# Patient Record
Sex: FEMALE | Race: WHITE | NOT HISPANIC OR LATINO | Employment: OTHER | URBAN - METROPOLITAN AREA
[De-identification: names, ages, dates, MRNs, and addresses within clinical notes are randomized per-mention and may not be internally consistent; named-entity substitution may affect disease eponyms.]

---

## 2022-01-12 ENCOUNTER — LAB VISIT (OUTPATIENT)
Dept: LAB | Facility: HOSPITAL | Age: 67
End: 2022-01-12
Attending: FAMILY MEDICINE
Payer: MEDICARE

## 2022-01-12 ENCOUNTER — OFFICE VISIT (OUTPATIENT)
Dept: FAMILY MEDICINE | Facility: CLINIC | Age: 67
End: 2022-01-12
Payer: MEDICARE

## 2022-01-12 VITALS
WEIGHT: 119 LBS | RESPIRATION RATE: 15 BRPM | HEIGHT: 65 IN | HEART RATE: 72 BPM | DIASTOLIC BLOOD PRESSURE: 80 MMHG | SYSTOLIC BLOOD PRESSURE: 118 MMHG | BODY MASS INDEX: 19.83 KG/M2

## 2022-01-12 DIAGNOSIS — Z11.59 NEED FOR HEPATITIS C SCREENING TEST: ICD-10-CM

## 2022-01-12 DIAGNOSIS — R42 DIZZINESS: ICD-10-CM

## 2022-01-12 DIAGNOSIS — L65.9 HAIR LOSS: ICD-10-CM

## 2022-01-12 DIAGNOSIS — Z12.11 COLON CANCER SCREENING: ICD-10-CM

## 2022-01-12 DIAGNOSIS — Z12.11 ENCOUNTER FOR SCREENING COLONOSCOPY: ICD-10-CM

## 2022-01-12 DIAGNOSIS — M85.89 OSTEOPENIA OF MULTIPLE SITES: ICD-10-CM

## 2022-01-12 DIAGNOSIS — R42 DIZZINESS: Primary | ICD-10-CM

## 2022-01-12 DIAGNOSIS — J45.20 MILD INTERMITTENT ASTHMA WITHOUT COMPLICATION: ICD-10-CM

## 2022-01-12 DIAGNOSIS — E03.9 ACQUIRED HYPOTHYROIDISM: ICD-10-CM

## 2022-01-12 DIAGNOSIS — Z12.31 SCREENING MAMMOGRAM FOR BREAST CANCER: ICD-10-CM

## 2022-01-12 DIAGNOSIS — I10 ESSENTIAL HYPERTENSION: ICD-10-CM

## 2022-01-12 DIAGNOSIS — Z76.89 ENCOUNTER TO ESTABLISH CARE WITH NEW DOCTOR: ICD-10-CM

## 2022-01-12 LAB
ALBUMIN SERPL BCP-MCNC: 4 G/DL (ref 3.5–5.2)
ALP SERPL-CCNC: 59 U/L (ref 55–135)
ALT SERPL W/O P-5'-P-CCNC: 26 U/L (ref 10–44)
ANION GAP SERPL CALC-SCNC: 9 MMOL/L (ref 8–16)
AST SERPL-CCNC: 26 U/L (ref 10–40)
BASOPHILS # BLD AUTO: 0.07 K/UL (ref 0–0.2)
BASOPHILS NFR BLD: 1.3 % (ref 0–1.9)
BILIRUB SERPL-MCNC: 0.8 MG/DL (ref 0.1–1)
BUN SERPL-MCNC: 8 MG/DL (ref 8–23)
CALCIUM SERPL-MCNC: 9.5 MG/DL (ref 8.7–10.5)
CHLORIDE SERPL-SCNC: 98 MMOL/L (ref 95–110)
CHOLEST SERPL-MCNC: 240 MG/DL (ref 120–199)
CHOLEST/HDLC SERPL: 2.5 {RATIO} (ref 2–5)
CO2 SERPL-SCNC: 27 MMOL/L (ref 23–29)
CREAT SERPL-MCNC: 0.7 MG/DL (ref 0.5–1.4)
DIFFERENTIAL METHOD: ABNORMAL
EOSINOPHIL # BLD AUTO: 0.3 K/UL (ref 0–0.5)
EOSINOPHIL NFR BLD: 6.3 % (ref 0–8)
ERYTHROCYTE [DISTWIDTH] IN BLOOD BY AUTOMATED COUNT: 13.8 % (ref 11.5–14.5)
EST. GFR  (AFRICAN AMERICAN): >60 ML/MIN/1.73 M^2
EST. GFR  (NON AFRICAN AMERICAN): >60 ML/MIN/1.73 M^2
FERRITIN SERPL-MCNC: 124 NG/ML (ref 20–300)
GLUCOSE SERPL-MCNC: 96 MG/DL (ref 70–110)
HCT VFR BLD AUTO: 40 % (ref 37–48.5)
HDLC SERPL-MCNC: 95 MG/DL (ref 40–75)
HDLC SERPL: 39.6 % (ref 20–50)
HGB BLD-MCNC: 13.7 G/DL (ref 12–16)
IMM GRANULOCYTES # BLD AUTO: 0.01 K/UL (ref 0–0.04)
IMM GRANULOCYTES NFR BLD AUTO: 0.2 % (ref 0–0.5)
IRON SERPL-MCNC: 172 UG/DL (ref 30–160)
LDLC SERPL CALC-MCNC: 131.2 MG/DL (ref 63–159)
LYMPHOCYTES # BLD AUTO: 1.6 K/UL (ref 1–4.8)
LYMPHOCYTES NFR BLD: 28.9 % (ref 18–48)
MCH RBC QN AUTO: 31.5 PG (ref 27–31)
MCHC RBC AUTO-ENTMCNC: 34.3 G/DL (ref 32–36)
MCV RBC AUTO: 92 FL (ref 82–98)
MONOCYTES # BLD AUTO: 0.6 K/UL (ref 0.3–1)
MONOCYTES NFR BLD: 10.6 % (ref 4–15)
NEUTROPHILS # BLD AUTO: 2.8 K/UL (ref 1.8–7.7)
NEUTROPHILS NFR BLD: 52.7 % (ref 38–73)
NONHDLC SERPL-MCNC: 145 MG/DL
NRBC BLD-RTO: 0 /100 WBC
PLATELET # BLD AUTO: 287 K/UL (ref 150–450)
PMV BLD AUTO: 8.1 FL (ref 9.2–12.9)
POTASSIUM SERPL-SCNC: 4.2 MMOL/L (ref 3.5–5.1)
PROT SERPL-MCNC: 7.1 G/DL (ref 6–8.4)
RBC # BLD AUTO: 4.35 M/UL (ref 4–5.4)
SATURATED IRON: 53 % (ref 20–50)
SODIUM SERPL-SCNC: 134 MMOL/L (ref 136–145)
TOTAL IRON BINDING CAPACITY: 323 UG/DL (ref 250–450)
TRANSFERRIN SERPL-MCNC: 218 MG/DL (ref 200–375)
TRIGL SERPL-MCNC: 69 MG/DL (ref 30–150)
TSH SERPL DL<=0.005 MIU/L-ACNC: 1.05 UIU/ML (ref 0.4–4)
WBC # BLD AUTO: 5.36 K/UL (ref 3.9–12.7)

## 2022-01-12 PROCEDURE — 80053 COMPREHEN METABOLIC PANEL: CPT | Performed by: FAMILY MEDICINE

## 2022-01-12 PROCEDURE — 84443 ASSAY THYROID STIM HORMONE: CPT | Performed by: FAMILY MEDICINE

## 2022-01-12 PROCEDURE — 80061 LIPID PANEL: CPT | Performed by: FAMILY MEDICINE

## 2022-01-12 PROCEDURE — 99999 PR PBB SHADOW E&M-NEW PATIENT-LVL V: CPT | Mod: PBBFAC,,, | Performed by: FAMILY MEDICINE

## 2022-01-12 PROCEDURE — 36415 COLL VENOUS BLD VENIPUNCTURE: CPT | Performed by: FAMILY MEDICINE

## 2022-01-12 PROCEDURE — 99204 PR OFFICE/OUTPT VISIT, NEW, LEVL IV, 45-59 MIN: ICD-10-PCS | Mod: S$PBB,,, | Performed by: FAMILY MEDICINE

## 2022-01-12 PROCEDURE — 99999 PR PBB SHADOW E&M-NEW PATIENT-LVL V: ICD-10-PCS | Mod: PBBFAC,,, | Performed by: FAMILY MEDICINE

## 2022-01-12 PROCEDURE — 85025 COMPLETE CBC W/AUTO DIFF WBC: CPT | Performed by: FAMILY MEDICINE

## 2022-01-12 PROCEDURE — 99205 OFFICE O/P NEW HI 60 MIN: CPT | Mod: PBBFAC | Performed by: FAMILY MEDICINE

## 2022-01-12 PROCEDURE — 82728 ASSAY OF FERRITIN: CPT | Performed by: FAMILY MEDICINE

## 2022-01-12 PROCEDURE — 99204 OFFICE O/P NEW MOD 45 MIN: CPT | Mod: S$PBB,,, | Performed by: FAMILY MEDICINE

## 2022-01-12 PROCEDURE — 84466 ASSAY OF TRANSFERRIN: CPT | Performed by: FAMILY MEDICINE

## 2022-01-12 RX ORDER — FLUOXETINE 10 MG/1
10 CAPSULE ORAL DAILY
COMMUNITY
Start: 2022-01-01 | End: 2022-01-12

## 2022-01-12 RX ORDER — AMLODIPINE BESYLATE 5 MG/1
TABLET ORAL
COMMUNITY
Start: 2022-01-01 | End: 2022-07-25 | Stop reason: SDUPTHER

## 2022-01-12 RX ORDER — LEVOTHYROXINE SODIUM 75 UG/1
75 TABLET ORAL DAILY
COMMUNITY
Start: 2022-01-01 | End: 2022-06-20 | Stop reason: SDUPTHER

## 2022-01-12 RX ORDER — FLUTICASONE FUROATE AND VILANTEROL TRIFENATATE 100; 25 UG/1; UG/1
1 POWDER RESPIRATORY (INHALATION)
COMMUNITY
Start: 2021-07-08 | End: 2022-09-01 | Stop reason: SDUPTHER

## 2022-01-12 RX ORDER — TRAZODONE HYDROCHLORIDE 50 MG/1
TABLET ORAL
COMMUNITY
Start: 2022-01-01 | End: 2022-06-20 | Stop reason: SDUPTHER

## 2022-01-12 RX ORDER — METOPROLOL SUCCINATE 50 MG/1
TABLET, EXTENDED RELEASE ORAL
COMMUNITY
Start: 2022-01-01 | End: 2022-05-17 | Stop reason: SDUPTHER

## 2022-01-12 NOTE — PROGRESS NOTES
[facility] - Clinic Note    Subjective      Ms. Jackson is a 66 y.o. female who presents to clinic to [facility] care.     Previously from [place].     H/o HTN: currently on amlodipine and metoprolol.   BP well controlled today.   Compliant with med.     Hypothyroidism: takes levothyroxine.   Complains of hair loss recently.   Concerned about weight loss as well. States that she usually weighs 127lbs. todays weight is 119lbs.   She has stopped Prozac as she is doing better since her divorce.     Reports seasonal asthma. Will occasionally have to use breo. No recent use.   Previous Pulm was Dr. Alejo.     Due for mammogram, dexa, and colonoscopy.    Patient also complains of dizziness. Intermittent.   Noticed with positional changes and head changes.   Will feel off balanced.   Denies syncopal episodes.    PMH Edith has a past medical history of Asthma, Hypertension, Osteopenia, and Thyroid disease.   PSXH Edith has a past surgical history that includes Hip replacement arthroplasty (Right).    Edith's family history includes Diabetes in her mother; Heart attack in her father and mother; Stroke in her mother; Thyroid disease in her mother.    Edith reports that she has never smoked. She has never used smokeless tobacco. She reports current alcohol use. She reports previous drug use.   ALG Edith is allergic to percocet [oxycodone-acetaminophen] and penicillins.   DAVID Sharma has a current medication list which includes the following prescription(s): breo ellipta, amlodipine, levothyroxine, metoprolol succinate, and trazodone.     Review of Systems   Constitutional: Positive for unexpected weight change. Negative for activity change, appetite change, chills, fatigue and fever.   Eyes: Negative for visual disturbance.   Respiratory: Negative for cough and shortness of breath.    Cardiovascular: Negative for chest pain, palpitations and leg swelling.   Gastrointestinal: Negative for abdominal  "pain, nausea and vomiting.   Skin: Negative for wound.   Neurological: Positive for dizziness. Negative for headaches.   Psychiatric/Behavioral: Negative for confusion.     Objective     /80 (BP Location: Left arm, Patient Position: Standing, BP Method: Medium (Manual))   Pulse 72   Resp 15   Ht 5' 5" (1.651 m)   Wt 54 kg (119 lb)   BMI 19.80 kg/m²     Physical Exam   Constitutional: She appears well-developed and well-nourished.  Non-toxic appearance. She does not appear ill. No distress.   HENT:   Head: Normocephalic and atraumatic.   Right Ear: Tympanic membrane, external ear and ear canal normal.   Left Ear: Tympanic membrane, external ear and ear canal normal.   Nose: Nose normal.   Eyes: Right eye exhibits no discharge. Left eye exhibits no discharge. No scleral icterus.   Cardiovascular: Normal rate, regular rhythm, normal heart sounds, intact distal pulses and normal pulses. Exam reveals no gallop and no friction rub.   No murmur heard.  Pulmonary/Chest: Effort normal and breath sounds normal. No respiratory distress. She has no wheezes. She has no rhonchi. She has no rales.   Abdominal: Normal appearance.   Musculoskeletal:      Cervical back: Neck supple.      Right lower leg: No edema.      Left lower leg: No edema.   Lymphadenopathy:     She has no cervical adenopathy.   Neurological: She is alert.   Skin: Skin is warm and dry. Capillary refill takes less than 2 seconds. She is not diaphoretic.   Psychiatric: She has a normal mood and affect. Her behavior is normal. Mood, judgment and thought content normal.   Vitals reviewed.     Assessment/Plan     Edith was seen today for establish care.    Diagnoses and all orders for this visit:  -New patient and new problem to me    Dizziness  -     TSH; Future  -     Comprehensive Metabolic Panel; Future  -     CBC Auto Differential; Future    Mild intermittent asthma without complication  -stable.     Acquired hypothyroidism  -     TSH; " Future  - Obtain labs and adjust regimen as indicated    Hair loss  -     TSH; Future  -     Iron and TIBC; Future  -     Ferritin; Future  - Obtain labs and adjust regimen as indicated    Essential hypertension  -     Lipid Panel; Future  -     Comprehensive Metabolic Panel; Future  -     CBC Auto Differential; Future    Osteopenia of multiple sites  -     DXA Bone Density Spine And Hip; Future    Screening mammogram for breast cancer  -     Mammo Digital Screening Bilat; Future    Encounter for screening colonoscopy  -     Ambulatory referral/consult to General Surgery; Future    Encounter to establish care with new doctor    -orthostatics reviewed. Stop metoprolol and monitor BP and if improvement in dizziness.  -weight loss likely from stopping prozac. Will continue to monitor    Follow up in about 4 weeks (around 2/9/2022), or if symptoms worsen or fail to improve.    Future Appointments   Date Time Provider Department Center   1/25/2022  8:00 AM [facility] MAMMO1 [facility] MAMMO [facility]   1/25/2022  8:20 AM [facility] DEXA1 [facility] DEXA [facility]   2/4/2022  8:15 AM Alessandra Springer MD [facility]   2/4/2022  9:40 AM Shanice Yang MD [facility]       Shanice Yang MD  Family Medicine  [facility]

## 2022-01-25 ENCOUNTER — HOSPITAL ENCOUNTER (OUTPATIENT)
Dept: RADIOLOGY | Facility: HOSPITAL | Age: 67
Discharge: HOME | End: 2022-01-25
Attending: FAMILY MEDICINE
Payer: MEDICARE

## 2022-01-25 ENCOUNTER — TELEPHONE (OUTPATIENT)
Dept: FAMILY MEDICINE | Facility: CLINIC | Age: 67
End: 2022-01-25
Payer: MEDICARE

## 2022-01-25 VITALS — HEIGHT: 65 IN | WEIGHT: 121.25 LBS | BODY MASS INDEX: 20.2 KG/M2

## 2022-01-25 DIAGNOSIS — Z12.31 ENCOUNTER FOR SCREENING MAMMOGRAM FOR MALIGNANT NEOPLASM OF BREAST: ICD-10-CM

## 2022-01-25 DIAGNOSIS — M85.89 OSTEOPENIA OF MULTIPLE SITES: ICD-10-CM

## 2022-01-25 PROCEDURE — 77063 MAMMO DIGITAL SCREENING BILAT WITH TOMO: ICD-10-PCS | Mod: 26,,, | Performed by: RADIOLOGY

## 2022-01-25 PROCEDURE — 77067 SCR MAMMO BI INCL CAD: CPT | Mod: TC

## 2022-01-25 PROCEDURE — 77063 BREAST TOMOSYNTHESIS BI: CPT | Mod: 26,,, | Performed by: RADIOLOGY

## 2022-01-25 PROCEDURE — 77063 BREAST TOMOSYNTHESIS BI: CPT | Mod: TC

## 2022-01-25 PROCEDURE — 77080 DEXA BONE DENSITY SPINE HIP: ICD-10-PCS | Mod: 26,,, | Performed by: RADIOLOGY

## 2022-01-25 PROCEDURE — 77067 SCR MAMMO BI INCL CAD: CPT | Mod: 26,,, | Performed by: RADIOLOGY

## 2022-01-25 PROCEDURE — 77080 DXA BONE DENSITY AXIAL: CPT | Mod: 26,,, | Performed by: RADIOLOGY

## 2022-01-25 PROCEDURE — 77067 MAMMO DIGITAL SCREENING BILAT WITH TOMO: ICD-10-PCS | Mod: 26,,, | Performed by: RADIOLOGY

## 2022-01-25 PROCEDURE — 77080 DXA BONE DENSITY AXIAL: CPT | Mod: TC

## 2022-01-25 NOTE — TELEPHONE ENCOUNTER
----- Message from Shanice Yang MD sent at 1/25/2022  2:44 PM CST -----  Your mammogram is normal. Repeat will be in 1 year.

## 2022-02-02 ENCOUNTER — TELEPHONE (OUTPATIENT)
Dept: FAMILY MEDICINE | Facility: CLINIC | Age: 67
End: 2022-02-02
Payer: MEDICARE

## 2022-02-02 ENCOUNTER — LAB VISIT (OUTPATIENT)
Dept: LAB | Facility: HOSPITAL | Age: 67
End: 2022-02-02
Attending: FAMILY MEDICINE
Payer: MEDICARE

## 2022-02-02 DIAGNOSIS — R35.0 URINARY FREQUENCY: Primary | ICD-10-CM

## 2022-02-02 DIAGNOSIS — R35.0 URINARY FREQUENCY: ICD-10-CM

## 2022-02-02 LAB
BACTERIA #/AREA URNS HPF: ABNORMAL /HPF
BILIRUB UR QL STRIP: ABNORMAL
CLARITY UR: CLEAR
COLOR UR: ABNORMAL
GLUCOSE UR QL STRIP: NEGATIVE
HGB UR QL STRIP: ABNORMAL
KETONES UR QL STRIP: ABNORMAL
LEUKOCYTE ESTERASE UR QL STRIP: ABNORMAL
MICROSCOPIC COMMENT: ABNORMAL
NITRITE UR QL STRIP: ABNORMAL
PH UR STRIP: ABNORMAL [PH] (ref 5–8)
PROT UR QL STRIP: ABNORMAL
RBC #/AREA URNS HPF: 4 /HPF (ref 0–4)
SP GR UR STRIP: 1.01 (ref 1–1.03)
URN SPEC COLLECT METH UR: ABNORMAL
UROBILINOGEN UR STRIP-ACNC: ABNORMAL EU/DL
WBC #/AREA URNS HPF: 8 /HPF (ref 0–5)

## 2022-02-02 PROCEDURE — 81000 URINALYSIS NONAUTO W/SCOPE: CPT | Performed by: FAMILY MEDICINE

## 2022-02-02 RX ORDER — SULFAMETHOXAZOLE AND TRIMETHOPRIM 800; 160 MG/1; MG/1
1 TABLET ORAL 2 TIMES DAILY
Qty: 6 TABLET | Refills: 0 | Status: SHIPPED | OUTPATIENT
Start: 2022-02-02 | End: 2022-02-05

## 2022-02-02 NOTE — TELEPHONE ENCOUNTER
When did your symptoms start?  2 days ago  Are you having burning with urination? no  Are you having frequent trips to the restroom?  yes  Do you constantly have the urge to urinate with small amounts of urine? yes  Fever? no  Body aches? Yes  Patient is leaking urine.          What pharmacy? [facility]

## 2022-02-04 ENCOUNTER — OFFICE VISIT (OUTPATIENT)
Dept: SURGERY | Facility: CLINIC | Age: 67
End: 2022-02-04
Payer: MEDICARE

## 2022-02-04 ENCOUNTER — OFFICE VISIT (OUTPATIENT)
Dept: FAMILY MEDICINE | Facility: CLINIC | Age: 67
End: 2022-02-04
Payer: MEDICARE

## 2022-02-04 VITALS
HEIGHT: 65 IN | WEIGHT: 120 LBS | OXYGEN SATURATION: 97 % | DIASTOLIC BLOOD PRESSURE: 80 MMHG | BODY MASS INDEX: 19.99 KG/M2 | SYSTOLIC BLOOD PRESSURE: 128 MMHG | RESPIRATION RATE: 16 BRPM | BODY MASS INDEX: 19.99 KG/M2 | WEIGHT: 120 LBS | SYSTOLIC BLOOD PRESSURE: 127 MMHG | HEART RATE: 77 BPM | HEART RATE: 77 BPM | RESPIRATION RATE: 17 BRPM | DIASTOLIC BLOOD PRESSURE: 80 MMHG | OXYGEN SATURATION: 96 % | HEIGHT: 65 IN

## 2022-02-04 DIAGNOSIS — I10 ESSENTIAL HYPERTENSION: ICD-10-CM

## 2022-02-04 DIAGNOSIS — R42 DIZZINESS: Primary | ICD-10-CM

## 2022-02-04 DIAGNOSIS — E03.9 ACQUIRED HYPOTHYROIDISM: ICD-10-CM

## 2022-02-04 DIAGNOSIS — Z01.818 PRE-OP TESTING: ICD-10-CM

## 2022-02-04 DIAGNOSIS — Z12.11 ENCOUNTER FOR SCREENING COLONOSCOPY: Primary | ICD-10-CM

## 2022-02-04 PROCEDURE — 99999 PR PBB SHADOW E&M-EST. PATIENT-LVL III: CPT | Mod: PBBFAC,,, | Performed by: FAMILY MEDICINE

## 2022-02-04 PROCEDURE — 99999 PR PBB SHADOW E&M-EST. PATIENT-LVL V: CPT | Mod: PBBFAC,,, | Performed by: STUDENT IN AN ORGANIZED HEALTH CARE EDUCATION/TRAINING PROGRAM

## 2022-02-04 PROCEDURE — 99999 PR PBB SHADOW E&M-EST. PATIENT-LVL III: ICD-10-PCS | Mod: PBBFAC,,, | Performed by: FAMILY MEDICINE

## 2022-02-04 PROCEDURE — 99499 NO LOS: ICD-10-PCS | Mod: S$PBB,,, | Performed by: STUDENT IN AN ORGANIZED HEALTH CARE EDUCATION/TRAINING PROGRAM

## 2022-02-04 PROCEDURE — 99999 PR PBB SHADOW E&M-EST. PATIENT-LVL V: ICD-10-PCS | Mod: PBBFAC,,, | Performed by: STUDENT IN AN ORGANIZED HEALTH CARE EDUCATION/TRAINING PROGRAM

## 2022-02-04 PROCEDURE — 99215 OFFICE O/P EST HI 40 MIN: CPT | Mod: PBBFAC | Performed by: STUDENT IN AN ORGANIZED HEALTH CARE EDUCATION/TRAINING PROGRAM

## 2022-02-04 PROCEDURE — 99214 PR OFFICE/OUTPT VISIT, EST, LEVL IV, 30-39 MIN: ICD-10-PCS | Mod: S$PBB,,, | Performed by: FAMILY MEDICINE

## 2022-02-04 PROCEDURE — 99499 UNLISTED E&M SERVICE: CPT | Mod: S$PBB,,, | Performed by: STUDENT IN AN ORGANIZED HEALTH CARE EDUCATION/TRAINING PROGRAM

## 2022-02-04 PROCEDURE — 99214 OFFICE O/P EST MOD 30 MIN: CPT | Mod: S$PBB,,, | Performed by: FAMILY MEDICINE

## 2022-02-04 PROCEDURE — 99213 OFFICE O/P EST LOW 20 MIN: CPT | Mod: PBBFAC,27 | Performed by: FAMILY MEDICINE

## 2022-02-04 RX ORDER — SODIUM CHLORIDE 9 MG/ML
INJECTION, SOLUTION INTRAVENOUS CONTINUOUS
Status: CANCELLED | OUTPATIENT
Start: 2022-02-04

## 2022-02-04 RX ORDER — SOD SULF/POT CHLORIDE/MAG SULF 1.479 G
24 TABLET ORAL DAILY
Qty: 24 TABLET | Refills: 0 | Status: ON HOLD | OUTPATIENT
Start: 2022-02-04 | End: 2022-03-17 | Stop reason: HOSPADM

## 2022-02-04 NOTE — PROGRESS NOTES
"[facility] - Clinic Note    Subjective      Ms. Jackson is a 66 y.o. female who presents to clinic for a follow up of dizziness.     Labs discussed with patient at her appt.     Dizziness-metoprolol was stopped at her last visit.   Noticed slight improvement in dizziness.   BP well controlled.    PMH Edith has a past medical history of Asthma, Hypertension, Osteopenia, and Thyroid disease.   PSXH Edith has a past surgical history that includes Hip replacement arthroplasty (Right); Breast reconstruction; and Augmentation of breast (Bilateral).   FH Edith's family history includes Diabetes in her mother; Heart attack in her father and mother; Stroke in her mother; Thyroid disease in her mother.   SH Edith reports that she has never smoked. She has never used smokeless tobacco. She reports current alcohol use. She reports previous drug use.   ALG Edith is allergic to percocet [oxycodone-acetaminophen] and penicillins.   MED Edith has a current medication list which includes the following prescription(s): amlodipine, breo ellipta, levothyroxine, metoprolol succinate, trazodone, and sutab.     Review of Systems   Constitutional: Negative for activity change, appetite change, chills, fatigue and fever.   Eyes: Negative for visual disturbance.   Respiratory: Negative for cough and shortness of breath.    Cardiovascular: Negative for chest pain, palpitations and leg swelling.   Gastrointestinal: Negative for abdominal pain, nausea and vomiting.   Skin: Negative for wound.   Neurological: Negative for dizziness and headaches.   Psychiatric/Behavioral: Negative for confusion.     Objective     /80 (BP Location: Left arm, Patient Position: Sitting, BP Method: Medium (Manual))   Pulse 77   Resp 16   Ht 5' 5" (1.651 m)   Wt 54.4 kg (120 lb)   LMP  (LMP Unknown)   SpO2 97%   BMI 19.97 kg/m²     Physical Exam   Constitutional: She appears well-developed and well-nourished.  Non-toxic appearance. " She does not appear ill. No distress.   HENT:   Head: Normocephalic and atraumatic.   Eyes: Right eye exhibits no discharge. Left eye exhibits no discharge.   Cardiovascular: Normal rate, regular rhythm, normal heart sounds, intact distal pulses and normal pulses. Exam reveals no gallop and no friction rub.   No murmur heard.  Pulmonary/Chest: Effort normal and breath sounds normal. No respiratory distress. She has no wheezes. She has no rhonchi. She has no rales.   Abdominal: Normal appearance.   Musculoskeletal:      Cervical back: Neck supple.      Right lower leg: No edema.      Left lower leg: No edema.   Lymphadenopathy:     She has no cervical adenopathy.   Neurological: She is alert.   Skin: Skin is warm and dry. Capillary refill takes less than 2 seconds. She is not diaphoretic.   Psychiatric: She has a normal mood and affect. Her behavior is normal. Mood, judgment and thought content normal.   Vitals reviewed.     Assessment/Plan     Edith was seen today for follow-up.    Diagnoses and all orders for this visit:    Dizziness    Acquired hypothyroidism    Essential hypertension      -continue current regimen.         Future Appointments   Date Time Provider Department Center   3/14/2022  8:00 AM [facility]   3/14/2022  8:45 AM [facility] EKG [facility]   4/1/2022  9:00 AM Alessandra Springer [facility]   8/4/2022 10:20 AM Shanice Yang MD [facility]       Shanice Yang MD  Family Medicine  [facility]

## 2022-02-04 NOTE — PROGRESS NOTES
Patient, Edith F Jackson, was instructed on Suprep Tabs bowel prep. Patient was given instructions on pre-op, curly-op, and post-op scheduled appointments.  Patient received blue folder containing all written instructions.      Roberto Solano LPN

## 2022-02-04 NOTE — PATIENT INSTRUCTIONS
Patient Education       Colonoscopy   Why is this procedure done?   Colonoscopy is done so your doctor can see the inside of your large intestines, also called your colon, and your rectum. It uses a lighted tube called a scope, which has a tiny camera that can be moved through the large intestine. This may be done to:  · Check for colon cancer or growths called polyps  · Look for the source of rectal bleeding  · Find the cause of changes in your bowel movements  · Find the cause of belly or rectal pain  · Check results from other tests  · Check your response to treatment for other diseases  · Learn about weight loss  What happens before the procedure?   · Your doctor will ask you about your health history and do an exam. The doctor may order tests for your stool. Talk to the doctor about  ? All the drugs you are taking. Be sure to include all prescription and over-the-counter (OTC) drugs, and herbal supplements. Tell the doctor about any drug allergy. Bring a list of drugs you take with you.  ? Any bleeding problems. Be sure to tell your doctor if you are taking any drugs that may cause bleeding. Some of these are warfarin, rivaroxaban, apixaban, ticagrelor, clopidogrel, ketorolac, ibuprofen, naproxen, or aspirin. Certain vitamins and herbs, such as garlic and fish oil, may also add to the risk for bleeding. You may need to stop these drugs as well. Talk to your doctor about them.  · The colon needs to be cleaned out before this test. Your doctor will tell you to take drugs that will cause watery loose stools. These may be liquids, pills, or both. You may need to take these the day before and the day of your test.  · You will be placed on a clear liquid diet the day before the exam and you will need to only have clear liquids until the test is done. Clear liquids include water, sports drinks, broth, soft drinks, and juices, but avoid anything that is red or purple in color. Do not drink alcohol.  · Your doctor may  ask you not to eat or drink any food other than the drugs or liquids that clean out your colon.  · You will not be allowed to drive after the procedure. Ask a family member or a friend to help you get home and stay with you if possible.  What happens during the procedure?   · The staff will put an IV in your arm to give you fluids and drugs. You may be given a drug to make you sleepy.  · You will lie on your side with your knees bent and pulled up toward your chest.  · The doctor will use a small thin tube, called a scope, with a light and a camera on it. The tube is put into your anus and moved through your rectum and into the large intestine or colon.  · Small amounts of air are put into your colon. The camera lets your doctor look at the lining of your colon.  · Your doctor may take small tissue samples and remove small growths.     · The tube is then taken out.  · The procedure may take 30 to 45 minutes.  What happens after the procedure?   · You will go to a recovery area and the staff will watch you closely.  · You will want to rest after your procedure.  · You may feel groggy.  · You should be able to eat your usual diet after the test.  · You will have gas and you may have mild cramping. This is normal.  · A small amount of bleeding may happen during the first few days after your procedure.  · If tissue was removed, it will be sent to a lab to be checked. Your doctor will tell you the results after a week or two.  What problems could happen?   · Tear inside your colon  · Bleeding can happen for a few days afterwards  Where can I learn more?   American Cancer Society  https://www.cancer.org/cancer/colon-rectal-cancer/wiglvnzth-gwtfiavyf-pyndfuo/xxgsglivk-rprih-lbmg.html   American Society of Clinical Oncology  https://www.cancer.net/navigating-cancer-care/diagnosing-cancer/tests-and-procedures/colonoscopy   Last Reviewed Date   2021-03-10  Consumer Information Use and Disclaimer   This information is not  specific medical advice and does not replace information you receive from your health care provider. This is only a brief summary of general information. It does NOT include all information about conditions, illnesses, injuries, tests, procedures, treatments, therapies, discharge instructions or life-style choices that may apply to you. You must talk with your health care provider for complete information about your health and treatment options. This information should not be used to decide whether or not to accept your health care providers advice, instructions or recommendations. Only your health care provider has the knowledge and training to provide advice that is right for you.  Copyright   Copyright © 2021 UpToDate, Inc. and its affiliates and/or licensors. All rights reserved.

## 2022-02-04 NOTE — H&P
[facility] Surgery H&P Note    Subjective:       Patient ID: Edith Jackson is a 66 y.o. female.    Chief Complaint:  I need a screening colonoscopy.  HPI:  Edith Jackson is a 66 y.o. female presents today for evaluation of screening colonoscopy.    The patient has no hematochezia.    The patient has no family history of colon cancer.    The patient has had no weight loss or bowel habit changes.  Last colonoscopy was approximately 15 years ago in [place] and was reportedly normal.    Past Medical History:   Diagnosis Date    Asthma     Hypertension     Osteopenia     Thyroid disease      Past Surgical History:   Procedure Laterality Date    AUGMENTATION OF BREAST Bilateral     removal    BREAST RECONSTRUCTION      HIP REPLACEMENT ARTHROPLASTY Right      Family History   Problem Relation Age of Onset    Diabetes Mother     Stroke Mother     Heart attack Mother     Thyroid disease Mother     Heart attack Father     Breast cancer Neg Hx     Ovarian cancer Neg Hx      Social History     Socioeconomic History    Marital status: Single   Tobacco Use    Smoking status: Never Smoker    Smokeless tobacco: Never Used   Substance and Sexual Activity    Alcohol use: Yes     Comment: socially    Drug use: Not Currently       Current Outpatient Medications   Medication Sig Dispense Refill    amLODIPine (NORVASC) 5 MG tablet       fluticasone furoate-vilanteroL (BREO ELLIPTA) 100-25 mcg/dose diskus inhaler Inhale 1 puff into the lungs.      levothyroxine (SYNTHROID) 75 MCG tablet Take 75 mcg by mouth once daily.      metoprolol succinate (TOPROL-XL) 50 MG 24 hr tablet       sulfamethoxazole-trimethoprim 800-160mg (BACTRIM DS) 800-160 mg Tab Take 1 tablet by mouth 2 (two) times daily. for 3 days 6 tablet 0    traZODone (DESYREL) 50 MG tablet       sod sulf-pot chloride-mag sulf (SUTAB) 1.479-0.188- 0.225 gram tablet Take 24 tablets by mouth once daily. Take according to package  "instructions with indicated amount of water. 24 tablet 0     No current facility-administered medications for this visit.     Review of patient's allergies indicates:   Allergen Reactions    Percocet [oxycodone-acetaminophen] Other (See Comments)     Headaches    Penicillins Rash       10 point review of systems negative.    Objective:      Vitals:    02/04/22 0818   BP: 127/80   Pulse: 77   Resp: 17   SpO2: 96%   Weight: 54.4 kg (120 lb)   Height: 5' 5" (1.651 m)     Physical examination.  General well-developed well-nourished female in no acute distress.  Cardiovascular regular rate and rhythm.  Lungs nonlabored breathing, clear to auscultation bilateral  Abdomen soft nontender nondistended  Extremities no cyanosis clubbing or edema  Neuro afocal  Skin no rashes bruises or abrasions.         Assessment:  In need of screening colonoscopy.    Plan:  Screening colonoscopy.    Medical Decision Making/Counseling:  Risk and benefits of screening colonoscopy have been discussed in detail with the patient in preoperative holding.  Risk of bleeding (significant 1 in 500 cases), perforation (1 in 5000 cases), aspiration, need for further surgeries, need for admission to the hospital, need for blood transfusions etcetera have all been discussed.  During the procedure, small polyps (colon growths) will be removed and any inflammation irritation or masslike structures will be biopsied.  Patient voiced understanding, and agreement.  After informed discussion with the patient in preoperative holding, they voiced understanding of the risk benefits of the outlined procedure and desired to proceed today with signed informed consent.  All questions were answered to the patient's satisfaction.  They will be followed up if there is any pathology to be reviewed in surgery clinic in the next couple of weeks for evaluation    "

## 2022-03-14 ENCOUNTER — HOSPITAL ENCOUNTER (OUTPATIENT)
Dept: CARDIOLOGY | Facility: HOSPITAL | Age: 67
Discharge: HOME | End: 2022-03-14
Attending: STUDENT IN AN ORGANIZED HEALTH CARE EDUCATION/TRAINING PROGRAM
Payer: MEDICARE

## 2022-03-14 DIAGNOSIS — Z01.818 PRE-OP TESTING: ICD-10-CM

## 2022-03-14 PROCEDURE — 93005 ELECTROCARDIOGRAM TRACING: CPT

## 2022-03-14 PROCEDURE — 93010 EKG 12-LEAD: ICD-10-PCS | Mod: ,,, | Performed by: INTERNAL MEDICINE

## 2022-03-14 PROCEDURE — 93010 ELECTROCARDIOGRAM REPORT: CPT | Mod: ,,, | Performed by: INTERNAL MEDICINE

## 2022-03-17 ENCOUNTER — ANESTHESIA EVENT (OUTPATIENT)
Dept: SURGERY | Facility: HOSPITAL | Age: 67
End: 2022-03-17
Payer: MEDICARE

## 2022-03-17 ENCOUNTER — ANESTHESIA (OUTPATIENT)
Dept: SURGERY | Facility: HOSPITAL | Age: 67
End: 2022-03-17
Payer: MEDICARE

## 2022-03-17 ENCOUNTER — HOSPITAL ENCOUNTER (OUTPATIENT)
Facility: HOSPITAL | Age: 67
Discharge: HOME | End: 2022-03-17
Attending: STUDENT IN AN ORGANIZED HEALTH CARE EDUCATION/TRAINING PROGRAM | Admitting: STUDENT IN AN ORGANIZED HEALTH CARE EDUCATION/TRAINING PROGRAM
Payer: MEDICARE

## 2022-03-17 VITALS
WEIGHT: 120 LBS | DIASTOLIC BLOOD PRESSURE: 85 MMHG | TEMPERATURE: 98 F | HEIGHT: 65 IN | SYSTOLIC BLOOD PRESSURE: 132 MMHG | HEART RATE: 71 BPM | RESPIRATION RATE: 16 BRPM | BODY MASS INDEX: 19.99 KG/M2 | OXYGEN SATURATION: 100 %

## 2022-03-17 DIAGNOSIS — Z12.11 ENCOUNTER FOR SCREENING COLONOSCOPY: ICD-10-CM

## 2022-03-17 PROCEDURE — G0121 COLON CA SCRN NOT HI RSK IND: HCPCS | Mod: ,,, | Performed by: STUDENT IN AN ORGANIZED HEALTH CARE EDUCATION/TRAINING PROGRAM

## 2022-03-17 PROCEDURE — G0121 COLON CA SCRN NOT HI RSK IND: HCPCS | Performed by: STUDENT IN AN ORGANIZED HEALTH CARE EDUCATION/TRAINING PROGRAM

## 2022-03-17 PROCEDURE — 63600175 PHARM REV CODE 636 W HCPCS: Mod: JG | Performed by: NURSE ANESTHETIST, CERTIFIED REGISTERED

## 2022-03-17 PROCEDURE — 37000008 HC ANESTHESIA 1ST 15 MINUTES: Performed by: STUDENT IN AN ORGANIZED HEALTH CARE EDUCATION/TRAINING PROGRAM

## 2022-03-17 PROCEDURE — D9220A PRA ANESTHESIA: Mod: ,,, | Performed by: ANESTHESIOLOGY

## 2022-03-17 PROCEDURE — D9220A PRA ANESTHESIA: ICD-10-PCS | Mod: ,,, | Performed by: ANESTHESIOLOGY

## 2022-03-17 PROCEDURE — 37000009 HC ANESTHESIA EA ADD 15 MINS: Performed by: STUDENT IN AN ORGANIZED HEALTH CARE EDUCATION/TRAINING PROGRAM

## 2022-03-17 PROCEDURE — 25000003 PHARM REV CODE 250: Performed by: NURSE ANESTHETIST, CERTIFIED REGISTERED

## 2022-03-17 PROCEDURE — 63600175 PHARM REV CODE 636 W HCPCS: Performed by: ANESTHESIOLOGY

## 2022-03-17 PROCEDURE — G0121 COLON CA SCRN NOT HI RSK IND: ICD-10-PCS | Mod: ,,, | Performed by: STUDENT IN AN ORGANIZED HEALTH CARE EDUCATION/TRAINING PROGRAM

## 2022-03-17 RX ORDER — LIDOCAINE HYDROCHLORIDE 10 MG/ML
1 INJECTION, SOLUTION EPIDURAL; INFILTRATION; INTRACAUDAL; PERINEURAL ONCE
Status: DISCONTINUED | OUTPATIENT
Start: 2022-03-17 | End: 2022-03-17 | Stop reason: HOSPADM

## 2022-03-17 RX ORDER — GLUCAGON 1 MG
KIT INJECTION
Status: DISCONTINUED | OUTPATIENT
Start: 2022-03-17 | End: 2022-03-17

## 2022-03-17 RX ORDER — SODIUM CHLORIDE, SODIUM LACTATE, POTASSIUM CHLORIDE, CALCIUM CHLORIDE 600; 310; 30; 20 MG/100ML; MG/100ML; MG/100ML; MG/100ML
125 INJECTION, SOLUTION INTRAVENOUS CONTINUOUS
Status: DISCONTINUED | OUTPATIENT
Start: 2022-03-17 | End: 2022-03-17 | Stop reason: HOSPADM

## 2022-03-17 RX ORDER — SODIUM CHLORIDE 9 MG/ML
INJECTION, SOLUTION INTRAVENOUS CONTINUOUS
Status: DISCONTINUED | OUTPATIENT
Start: 2022-03-17 | End: 2022-03-17 | Stop reason: HOSPADM

## 2022-03-17 RX ORDER — ONDANSETRON 2 MG/ML
4 INJECTION INTRAMUSCULAR; INTRAVENOUS DAILY PRN
Status: DISCONTINUED | OUTPATIENT
Start: 2022-03-17 | End: 2022-03-17 | Stop reason: HOSPADM

## 2022-03-17 RX ORDER — SODIUM CHLORIDE, SODIUM LACTATE, POTASSIUM CHLORIDE, CALCIUM CHLORIDE 600; 310; 30; 20 MG/100ML; MG/100ML; MG/100ML; MG/100ML
INJECTION, SOLUTION INTRAVENOUS CONTINUOUS
Status: DISCONTINUED | OUTPATIENT
Start: 2022-03-17 | End: 2022-03-17 | Stop reason: HOSPADM

## 2022-03-17 RX ORDER — LIDOCAINE HYDROCHLORIDE 20 MG/ML
INJECTION, SOLUTION EPIDURAL; INFILTRATION; INTRACAUDAL; PERINEURAL
Status: DISCONTINUED | OUTPATIENT
Start: 2022-03-17 | End: 2022-03-17

## 2022-03-17 RX ORDER — PROPOFOL 10 MG/ML
VIAL (ML) INTRAVENOUS
Status: DISCONTINUED | OUTPATIENT
Start: 2022-03-17 | End: 2022-03-17

## 2022-03-17 RX ADMIN — PROPOFOL 100 MG: 10 INJECTION, EMULSION INTRAVENOUS at 01:03

## 2022-03-17 RX ADMIN — SODIUM CHLORIDE, POTASSIUM CHLORIDE, SODIUM LACTATE AND CALCIUM CHLORIDE: 600; 310; 30; 20 INJECTION, SOLUTION INTRAVENOUS at 01:03

## 2022-03-17 RX ADMIN — PROPOFOL 100 MG: 10 INJECTION, EMULSION INTRAVENOUS at 02:03

## 2022-03-17 RX ADMIN — LIDOCAINE HYDROCHLORIDE 100 MG: 20 INJECTION, SOLUTION EPIDURAL; INFILTRATION; INTRACAUDAL; PERINEURAL at 01:03

## 2022-03-17 RX ADMIN — GLUCAGON HYDROCHLORIDE 0.5 MG: KIT at 02:03

## 2022-03-17 NOTE — DISCHARGE INSTRUCTIONS

## 2022-03-17 NOTE — [employer] PATIENT INSTRUCTIONS
Discharge Summary/Instructions after an Endoscopic Procedure  Patient Name: Edith Jackson  Patient MRN: [id number]  Patient YOB: 1955  Thursday, March 17, 2022  Alessandra Springer MD  RESTRICTIONS:  During your procedure today, you received medications for sedation.  These   medications may affect your judgment, balance and coordination.  Therefore,   for 24 hours, you have the following restrictions:   - DO NOT drive a car, operate machinery, make legal/financial decisions,   sign important papers or drink alcohol.    ACTIVITY:  Today: no heavy lifting, straining or running due to procedural   sedation/anesthesia.  The following day: return to full activity including work.  DIET:  Eat and drink normally unless instructed otherwise.     TREATMENT FOR COMMON SIDE EFFECTS:  - Mild abdominal pain, nausea, belching, bloating or excessive gas:  rest,   eat lightly and use a heating pad.  - Sore Throat: treat with throat lozenges and/or gargle with warm salt   water.  - Because air was used during the procedure, expelling large amounts of air   from your rectum or belching is normal.  - If a bowel prep was taken, you may not have a bowel movement for 1-3 days.    This is normal.  SYMPTOMS TO WATCH FOR AND REPORT TO YOUR PHYSICIAN:  1. Abdominal pain or bloating, other than gas cramps.  2. Chest pain.  3. Back pain.  4. Signs of infection such as: chills or fever occurring within 24 hours   after the procedure.  5. Rectal bleeding, which would show as bright red, maroon, or black stools.   (A tablespoon of blood from the rectum is not serious, especially if   hemorrhoids are present.)  6. Vomiting.  7. Weakness or dizziness.  GO DIRECTLY TO THE NEAREST EMERGENCY ROOM IF YOU HAVE ANY OF THE FOLLOWING:      Difficulty breathing              Chills and/or fever over 101 F   Persistent vomiting and/or vomiting blood   Severe abdominal pain   Severe chest pain   Black, tarry stools   Bleeding- more than one  tablespoon   Any other symptom or condition that you feel may need urgent attention  Your doctor recommends these additional instructions:  If any biopsies were taken, your doctors clinic will contact you in 1 to 2   weeks with any results.  - Discharge patient to home.   - Resume previous diet.   - Continue present medications.   - Repeat colonoscopy in 10 years for screening purposes.   - Return to my office PRN.  For questions, problems or results please call your physician - Alessandra Springer MD at Work:  [phone].  [facility] EMERGENCY ROOM PHONE NUMBER: [phone]  IF A COMPLICATION OR EMERGENCY SITUATION ARISES AND YOU ARE UNABLE TO REACH   YOUR PHYSICIAN - GO DIRECTLY TO THE EMERGENCY ROOM.  MD Alessandra Millan MD  3/17/2022 2:20:39 PM  This report has been verified and signed electronically.  Dear patient,  As a result of recent federal legislation (The Federal Cures Act), you may   receive lab or pathology results from your procedure in your [facility]   account before your physician is able to contact you. Your physician or   their representative will relay the results to you with their   recommendations at their soonest availability.  Thank you,  [employer]

## 2022-03-17 NOTE — TRANSFER OF CARE
"Anesthesia Transfer of Care Note    Patient: Edith Jackson    Procedure(s) Performed: Procedure(s) (LRB):  COLONOSCOPY (N/A)    Patient location: PACU    Anesthesia Type: general    Transport from OR: Transported from OR on room air with adequate spontaneous ventilation    Post pain: adequate analgesia    Post assessment: no apparent anesthetic complications    Post vital signs: stable    Level of consciousness: sedated and responds to stimulation    Nausea/Vomiting: no nausea/vomiting    Complications: none    Transfer of care protocol was followed      Last vitals:   Visit Vitals  /79   Pulse 63   Temp 36.8 °C (98.3 °F)   Resp 18   Ht 5' 5" (1.651 m)   Wt 54.4 kg (120 lb)   LMP  (LMP Unknown)   SpO2 100%   Breastfeeding No   BMI 19.97 kg/m²     "

## 2022-03-17 NOTE — ANESTHESIA PREPROCEDURE EVALUATION
03/17/2022  Edith Jackson is a 66 y.o., female.      Pre-op Assessment    I have reviewed the Patient Summary Reports.     I have reviewed the Nursing Notes.    I have reviewed the Medications.     Review of Systems  Anesthesia Hx:  No problems with previous Anesthesia  Neg history of prior surgery. Denies Family Hx of Anesthesia complications.   Denies Personal Hx of Anesthesia complications.   Social:  Non-Smoker    Hematology/Oncology:  Hematology Normal   Oncology Normal     EENT/Dental:EENT/Dental Normal   Cardiovascular:   Hypertension, well controlled    Pulmonary:   Asthma asymptomatic    Renal/:  Renal/ Normal     Hepatic/GI:  Hepatic/GI Normal    Musculoskeletal:  Musculoskeletal Normal    Neurological:  Neurology Normal    Endocrine:   Hypothyroidism    Dermatological:  Skin Normal    Psych:  Psychiatric Normal           Physical Exam  General: Alert    Airway:  Mallampati: II   Mouth Opening: Normal  TM Distance: Normal  Neck ROM: Normal ROM    Dental:  Intact        Anesthesia Plan  Type of Anesthesia, risks & benefits discussed:    Anesthesia Type: Gen ETT  Post Op Pain Control Plan: multimodal analgesia  Airway Plan: Direct, Post-Induction  Informed Consent: Informed consent signed with the Patient and all parties understand the risks and agree with anesthesia plan.  All questions answered. Patient consented to blood products? No  ASA Score: 2  Day of Surgery Review of History & Physical: H&P Update referred to the surgeon/provider.    Ready For Surgery From Anesthesia Perspective.     .

## 2022-03-17 NOTE — H&P
[facility] H&P Note    Subjective:       Patient ID: Edith Jackson is a 66 y.o. female.    Chief Complaint:  I need a screening colonoscopy.  HPI:  Edith Jackson is a 66 y.o. female presents today for screening colonoscopy.   The patient has no hematochezia.    The patient has no family history of colon cancer.    The patient has had no weight loss or bowel habit changes.  Last colonoscopy was approximately 15 years ago in [place] and was reportedly normal.    Past Medical History:   Diagnosis Date    Asthma     Hypertension     Hypotension, iatrogenic     Osteopenia     Respiratory distress     Thyroid disease      Past Surgical History:   Procedure Laterality Date    AUGMENTATION OF BREAST Bilateral     removal    BREAST RECONSTRUCTION      HIP REPLACEMENT ARTHROPLASTY Right      Family History   Problem Relation Age of Onset    Diabetes Mother     Stroke Mother     Heart attack Mother     Thyroid disease Mother     Heart attack Father     Breast cancer Neg Hx     Ovarian cancer Neg Hx      Social History     Socioeconomic History    Marital status: Single   Tobacco Use    Smoking status: Never Smoker    Smokeless tobacco: Never Used   Substance and Sexual Activity    Alcohol use: Yes     Comment: socially    Drug use: Not Currently     Social Determinants of Health     Financial Resource Strain: Low Risk     Difficulty of Paying Living Expenses: Not very hard   Food Insecurity: Unknown    Worried About Running Out of Food in the Last Year: Patient refused    Ran Out of Food in the Last Year: Patient refused   Transportation Needs: Unknown    Lack of Transportation (Medical): Patient refused    Lack of Transportation (Non-Medical): Patient refused   Physical Activity: Unknown    Days of Exercise per Week: Patient refused   Stress: Unknown    Feeling of Stress : Patient refused   Social Connections: Unknown    Frequency of Communication with Friends and  "Family: Patient refused    Frequency of Social Gatherings with Friends and Family: Patient refused    Active Member of Clubs or Organizations: Patient refused    Attends Club or Organization Meetings: Patient refused    Marital Status: Patient refused   Housing Stability: Unknown    Unable to Pay for Housing in the Last Year: Patient refused    Unstable Housing in the Last Year: Patient refused       Current Facility-Administered Medications   Medication Dose Route Frequency Provider Last Rate Last Admin    0.9%  NaCl infusion   Intravenous Continuous Alessandra Springer MD        lactated ringers infusion   Intravenous Continuous Abhinav Ontiveros MD        LIDOcaine (PF) 10 mg/ml (1%) injection 10 mg  1 mL Intradermal Once Abhinav Ontiveros MD         Review of patient's allergies indicates:   Allergen Reactions    Percocet [oxycodone-acetaminophen] Other (See Comments)     Headaches    Penicillins Rash       10 point review of systems negative.    Objective:      Vitals:    03/17/22 1213   BP: 135/79   Pulse: 63   Resp: 18   Temp: 98.3 °F (36.8 °C)   SpO2: 100%   Weight: 54.4 kg (120 lb)   Height: 5' 5" (1.651 m)     Physical examination.  General well-developed well-nourished female in no acute distress.  Cardiovascular regular rate and rhythm.  Lungs nonlabored breathing, clear to auscultation bilateral  Abdomen soft nontender nondistended  Extremities no cyanosis clubbing or edema  Neuro afocal  Skin no rashes bruises or abrasions.         Assessment:  In need of screening colonoscopy.    Plan:  Screening colonoscopy.    Medical Decision Making/Counseling:  Risk and benefits of screening colonoscopy have been discussed in detail with the patient in preoperative holding.  Risk of bleeding (significant 1 in 500 cases), perforation (1 in 5000 cases), aspiration, need for further surgeries, need for admission to the hospital, need for blood transfusions etcetera have all been discussed.  During the procedure, " small polyps (colon growths) will be removed and any inflammation irritation or masslike structures will be biopsied.  Patient voiced understanding, and agreement.  After informed discussion with the patient in preoperative holding, they voiced understanding of the risk benefits of the outlined procedure and desired to proceed today with signed informed consent.  All questions were answered to the patient's satisfaction.  They will be followed up if there is any pathology to be reviewed in surgery clinic in the next couple of weeks for evaluation

## 2022-03-17 NOTE — DISCHARGE SUMMARY
[facility]  Discharge Note  Short Stay    Procedure(s) (LRB):  COLONOSCOPY (N/A)    OUTCOME: Patient tolerated treatment/procedure well without complication and is now ready for discharge.    DISPOSITION: Home or Self Care    FINAL DIAGNOSIS:  Encounter for screening colonoscopy    FOLLOWUP: In clinic    DISCHARGE INSTRUCTIONS:    Discharge Procedure Orders   Diet general     Call MD for:  temperature >100.4     Call MD for:  persistent nausea and vomiting     Call MD for:  severe uncontrolled pain     Call MD for:  difficulty breathing, headache or visual disturbances     Call MD for:  redness, tenderness, or signs of infection (pain, swelling, redness, odor or green/yellow discharge around incision site)     Call MD for:  persistent dizziness or light-headedness

## 2022-03-18 NOTE — ANESTHESIA POSTPROCEDURE EVALUATION
Anesthesia Post Evaluation    Patient: Edith Jackson    Procedure(s) Performed: Procedure(s) (LRB):  COLONOSCOPY (N/A)    Final Anesthesia Type: general      Patient location during evaluation: PACU  Patient participation: Yes- Able to Participate  Level of consciousness: awake and awake and alert  Post-procedure vital signs: reviewed and stable  Pain management: adequate  Airway patency: patent    PONV status at discharge: No PONV  Anesthetic complications: no      Cardiovascular status: blood pressure returned to baseline  Respiratory status: unassisted and spontaneous ventilation  Hydration status: euvolemic  Follow-up not needed.          Vitals Value Taken Time   /85 03/17/22 1447   Temp 36.6 °C (97.9 °F) 03/17/22 1415   Pulse 71 03/17/22 1446   Resp 16 03/17/22 1447   SpO2 100 % 03/17/22 1445   Vitals shown include unvalidated device data.      Event Time   Out of Recovery 14:46:00         Pain/Soraya Score: Soraya Score: 9 (3/17/2022  2:30 PM)  Modified Soraya Score: 19 (3/17/2022  2:45 PM)

## 2022-03-23 ENCOUNTER — PATIENT MESSAGE (OUTPATIENT)
Dept: FAMILY MEDICINE | Facility: CLINIC | Age: 67
End: 2022-03-23
Payer: MEDICARE

## 2022-03-23 RX ORDER — ESTRADIOL 0.1 MG/G
1 CREAM VAGINAL
Qty: 8 G | Refills: 11 | Status: SHIPPED | OUTPATIENT
Start: 2022-03-24 | End: 2023-05-08

## 2022-04-27 ENCOUNTER — TELEPHONE (OUTPATIENT)
Dept: FAMILY MEDICINE | Facility: CLINIC | Age: 67
End: 2022-04-27
Payer: MEDICARE

## 2022-04-27 ENCOUNTER — PATIENT MESSAGE (OUTPATIENT)
Dept: FAMILY MEDICINE | Facility: CLINIC | Age: 67
End: 2022-04-27
Payer: MEDICARE

## 2022-04-27 DIAGNOSIS — R35.0 URINARY FREQUENCY: Primary | ICD-10-CM

## 2022-04-27 RX ORDER — NITROFURANTOIN 25; 75 MG/1; MG/1
100 CAPSULE ORAL 2 TIMES DAILY
Qty: 14 CAPSULE | Refills: 0 | Status: SHIPPED | OUTPATIENT
Start: 2022-04-27 | End: 2022-05-04

## 2022-04-27 NOTE — TELEPHONE ENCOUNTER
----- Message from Ariella Claros sent at 4/27/2022 11:19 AM CDT -----  Contact: patient  Type: Needs Medical Advice  Who Called:  patient   Symptoms (please be specific):  bladder infection  How long has patient had these symptoms:  today      [id number] - [place], [place] - [address]  [address] [facility] [address]  Phone: [phone] Fax: [phone]      Best Call Back Number: [phone] (home)     Additional Information:

## 2022-04-27 NOTE — TELEPHONE ENCOUNTER
----- Message from Merle Bourne sent at 4/27/2022  1:46 PM CDT -----  Contact: dilip  Type:  Patient Returning Call    Who Called:  patient  Who Left Message for Patient:  [place]  Does the patient know what this is regarding?:  bladder infection she was in the bathroom when you call.  Can't get away from the restroom  Best Call Back Number:  [phone] (home)   Additional Information:  She said the call came across as a spam until she didn't hear back around lunch.  Thanks

## 2022-04-27 NOTE — TELEPHONE ENCOUNTER
Spoke with patient. Patient c/o bladder infection, urgency/frequency, spasms unable to go anywhere as she is constantly using the bathroom. Please advise

## 2022-04-28 NOTE — TELEPHONE ENCOUNTER
"Contacted patient, verified . Pt states she has taken medication for the last two nights and she feels "300%" better. Patient is requesting not to have urinalysis done at this time.   "

## 2022-05-17 DIAGNOSIS — I10 ESSENTIAL HYPERTENSION: Primary | ICD-10-CM

## 2022-05-17 RX ORDER — METOPROLOL SUCCINATE 50 MG/1
50 TABLET, EXTENDED RELEASE ORAL DAILY
Qty: 90 TABLET | Refills: 3 | Status: SHIPPED | OUTPATIENT
Start: 2022-05-17 | End: 2023-08-01 | Stop reason: SDUPTHER

## 2022-05-17 NOTE — TELEPHONE ENCOUNTER
----- Message from Lgoan Maldonado sent at 5/17/2022 10:12 AM CDT -----  Contact: pt at [phone]  Type:  RX Refill Request    Who Called: pt  Refill or New Rx:  REFILL  RX Name and Strength:  metoprolol succinate (TOPROL-XL) 50 MG 24 hr tablet  How is the patient currently taking it? (ex. 1XDay):  as directed  Is this a 30 day or 90 day RX:  90  Preferred Pharmacy with phone number:    [id number] - [place], [place] - [address]  [address] [address] [place] [address]  Phone: [phone] Fax: [phone]  Local or Mail Order:  local  Ordering Provider:  Trey Fischer Call Back Number:  [phone]  Additional Information:  Please call back and advise.

## 2022-05-31 ENCOUNTER — TELEPHONE (OUTPATIENT)
Dept: FAMILY MEDICINE | Facility: CLINIC | Age: 67
End: 2022-05-31
Payer: MEDICARE

## 2022-05-31 DIAGNOSIS — R35.0 URINARY FREQUENCY: Primary | ICD-10-CM

## 2022-05-31 RX ORDER — NITROFURANTOIN 25; 75 MG/1; MG/1
100 CAPSULE ORAL 2 TIMES DAILY
Qty: 14 CAPSULE | Refills: 0 | Status: SHIPPED | OUTPATIENT
Start: 2022-05-31 | End: 2022-06-07

## 2022-05-31 NOTE — TELEPHONE ENCOUNTER
----- Message from Berenice Elizabeth MA sent at 5/31/2022  1:32 PM CDT -----  Type: Needs Medical Advice  Who Called:  Edith Fischer Call Back Number: [phone]  Additional Information: patient would like a status update on an Rx that was requested

## 2022-05-31 NOTE — TELEPHONE ENCOUNTER
Requesting rx for bladder infection  Started last Thursday.   Frequency, urgency, burning. Please advise

## 2022-05-31 NOTE — TELEPHONE ENCOUNTER
----- Message from Caroline Rhoades sent at 5/31/2022 10:05 AM CDT -----  Needs Medical Advice    Who Called: PT     Symptoms (please be specific): bladder infection, frequency urination     How long has patient had these symptoms: started on Thursday    Pharmacy name and phone #:    [id number] - [place] - [address]   Phone: [phone]  Fax: [phone]    Best Call Back Number:[phone] ()

## 2022-06-03 ENCOUNTER — LAB VISIT (OUTPATIENT)
Dept: LAB | Facility: HOSPITAL | Age: 67
End: 2022-06-03
Attending: FAMILY MEDICINE
Payer: MEDICARE

## 2022-06-03 DIAGNOSIS — R35.0 URINARY FREQUENCY: ICD-10-CM

## 2022-06-03 LAB
BILIRUB UR QL STRIP: NEGATIVE
CLARITY UR: CLEAR
COLOR UR: YELLOW
GLUCOSE UR QL STRIP: NEGATIVE
HGB UR QL STRIP: NEGATIVE
KETONES UR QL STRIP: NEGATIVE
LEUKOCYTE ESTERASE UR QL STRIP: NEGATIVE
NITRITE UR QL STRIP: NEGATIVE
PH UR STRIP: 7 [PH] (ref 5–8)
PROT UR QL STRIP: NEGATIVE
SP GR UR STRIP: 1.02 (ref 1–1.03)
URN SPEC COLLECT METH UR: NORMAL
UROBILINOGEN UR STRIP-ACNC: NEGATIVE EU/DL

## 2022-06-03 PROCEDURE — 81003 URINALYSIS AUTO W/O SCOPE: CPT | Performed by: FAMILY MEDICINE

## 2022-06-07 ENCOUNTER — TELEPHONE (OUTPATIENT)
Dept: FAMILY MEDICINE | Facility: CLINIC | Age: 67
End: 2022-06-07
Payer: MEDICARE

## 2022-06-07 NOTE — TELEPHONE ENCOUNTER
----- Message from Shanice Yang MD sent at 6/7/2022  9:27 AM CDT -----  Your urine did not reveal a sign of infection. Have your symptoms improved with the antibiotic?

## 2022-06-20 DIAGNOSIS — G47.9 SLEEP DISORDER: ICD-10-CM

## 2022-06-20 DIAGNOSIS — E03.9 ACQUIRED HYPOTHYROIDISM: Primary | ICD-10-CM

## 2022-06-20 NOTE — TELEPHONE ENCOUNTER
----- Message from Myla Gannon sent at 6/20/2022  9:07 AM CDT -----  Contact: Patient  Type:  RX Refill Request    Who Called: Patient  Refill or New Rx:  RX Name and Strength:traZODone (DESYREL) 50 MG tablet and levothyroxine (SYNTHROID) 75 MCG tablet  Is this a 30 day or 90 day RX:90 days refill  Preferred Pharmacy with phone number:[id number] - [place], [place] - [address] [facility]   Phone:  [phone]  Fax:  [phone]  Would the patient rather a call back or a response via [facility]? Call back   Best Call Back Number:[phone]  Additional Information: Please assist

## 2022-06-21 RX ORDER — LEVOTHYROXINE SODIUM 75 UG/1
75 TABLET ORAL DAILY
Qty: 90 TABLET | Refills: 3 | Status: SHIPPED | OUTPATIENT
Start: 2022-06-21 | End: 2023-06-23

## 2022-06-21 RX ORDER — TRAZODONE HYDROCHLORIDE 50 MG/1
50 TABLET ORAL NIGHTLY
Qty: 90 TABLET | Refills: 3 | Status: SHIPPED | OUTPATIENT
Start: 2022-06-21 | End: 2023-08-23 | Stop reason: SDUPTHER

## 2022-07-25 RX ORDER — AMLODIPINE BESYLATE 5 MG/1
5 TABLET ORAL DAILY
Qty: 30 TABLET | Refills: 0 | Status: SHIPPED | OUTPATIENT
Start: 2022-07-25 | End: 2022-09-01 | Stop reason: SDUPTHER

## 2022-07-29 ENCOUNTER — OFFICE VISIT (OUTPATIENT)
Dept: CARDIOLOGY | Facility: CLINIC | Age: 67
End: 2022-07-29
Payer: MEDICARE

## 2022-07-29 VITALS
BODY MASS INDEX: 20.51 KG/M2 | HEART RATE: 59 BPM | SYSTOLIC BLOOD PRESSURE: 141 MMHG | RESPIRATION RATE: 18 BRPM | DIASTOLIC BLOOD PRESSURE: 83 MMHG | HEIGHT: 65 IN | WEIGHT: 123.13 LBS

## 2022-07-29 DIAGNOSIS — Z82.49 FAMILY HISTORY OF PREMATURE CAD: ICD-10-CM

## 2022-07-29 DIAGNOSIS — Z13.6 ENCOUNTER FOR SCREENING FOR CARDIOVASCULAR DISORDERS: ICD-10-CM

## 2022-07-29 DIAGNOSIS — Z91.89 CARDIOVASCULAR EVENT RISK: Primary | ICD-10-CM

## 2022-07-29 DIAGNOSIS — J45.20 MILD INTERMITTENT ASTHMA WITHOUT COMPLICATION: ICD-10-CM

## 2022-07-29 DIAGNOSIS — F43.9 STRESS: ICD-10-CM

## 2022-07-29 DIAGNOSIS — I10 ESSENTIAL HYPERTENSION: ICD-10-CM

## 2022-07-29 DIAGNOSIS — G47.19 EXCESSIVE DAYTIME SLEEPINESS: ICD-10-CM

## 2022-07-29 DIAGNOSIS — R06.83 SNORING: ICD-10-CM

## 2022-07-29 DIAGNOSIS — R53.82 CHRONIC FATIGUE: ICD-10-CM

## 2022-07-29 PROCEDURE — 99999 PR PBB SHADOW E&M-EST. PATIENT-LVL IV: ICD-10-PCS | Mod: PBBFAC,,, | Performed by: INTERNAL MEDICINE

## 2022-07-29 PROCEDURE — 99214 OFFICE O/P EST MOD 30 MIN: CPT | Mod: PBBFAC | Performed by: INTERNAL MEDICINE

## 2022-07-29 PROCEDURE — 99205 PR OFFICE/OUTPT VISIT, NEW, LEVL V, 60-74 MIN: ICD-10-PCS | Mod: S$PBB,,, | Performed by: INTERNAL MEDICINE

## 2022-07-29 PROCEDURE — 99205 OFFICE O/P NEW HI 60 MIN: CPT | Mod: S$PBB,,, | Performed by: INTERNAL MEDICINE

## 2022-07-29 PROCEDURE — 99999 PR PBB SHADOW E&M-EST. PATIENT-LVL IV: CPT | Mod: PBBFAC,,, | Performed by: INTERNAL MEDICINE

## 2022-07-29 NOTE — PROGRESS NOTES
Subjective:    Patient ID:  Edith Jackson is a 66 y.o. female who presents for CV evaluation of No chief complaint on file.  Came on own, brother had recent coronary stents. Premature family history for CAD  PCP: Shanice Yang MD  No recent cardiologist  Lives with friend, Larry, non-smoker  Retired , guidance counselor    Patient is a new patient to me.    Health literacy: high  Activities: very active, reduced due to heat, was walking 5-7 miles daily and swimming in the past, no problem, do not feel limited.  Nicotine: never   Alcohol: max 4 wine in any 24 hours, once every 2-3 months.  Illicit drugs: none  Cardiac symptoms: constant leg pain  Home BP: labile with stress  Medication compliance: yes, on 2 HTN meds.  Diet: regular, little salt  Caffeine: 1-2 cpd, have sleep disorder.  Labs:   Lab Results   Component Value Date    TSH 1.050 01/12/2022      No results found for: LABA1C, HGBA1C    Lab Results   Component Value Date    WBC 5.36 01/12/2022    HGB 13.7 01/12/2022    HCT 40.0 01/12/2022    MCV 92 01/12/2022     01/12/2022       CMP  Sodium   Date Value Ref Range Status   01/12/2022 134 (L) 136 - 145 mmol/L Final     Potassium   Date Value Ref Range Status   01/12/2022 4.2 3.5 - 5.1 mmol/L Final     Chloride   Date Value Ref Range Status   01/12/2022 98 95 - 110 mmol/L Final     CO2   Date Value Ref Range Status   01/12/2022 27 23 - 29 mmol/L Final     Glucose   Date Value Ref Range Status   01/12/2022 96 70 - 110 mg/dL Final     BUN   Date Value Ref Range Status   01/12/2022 8 8 - 23 mg/dL Final     Creatinine   Date Value Ref Range Status   01/12/2022 0.7 0.5 - 1.4 mg/dL Final     Calcium   Date Value Ref Range Status   01/12/2022 9.5 8.7 - 10.5 mg/dL Final     Total Protein   Date Value Ref Range Status   01/12/2022 7.1 6.0 - 8.4 g/dL Final     Albumin   Date Value Ref Range Status   01/12/2022 4.0 3.5 - 5.2 g/dL Final     Total Bilirubin   Date Value Ref Range Status    01/12/2022 0.8 0.1 - 1.0 mg/dL Final     Comment:     For infants and newborns, interpretation of results should be based  on gestational age, weight and in agreement with clinical  observations.    Premature Infant recommended reference ranges:  Up to 24 hours.............<8.0 mg/dL  Up to 48 hours............<12.0 mg/dL  3-5 days..................<15.0 mg/dL  6-29 days.................<15.0 mg/dL       Alkaline Phosphatase   Date Value Ref Range Status   01/12/2022 59 55 - 135 U/L Final     AST   Date Value Ref Range Status   01/12/2022 26 10 - 40 U/L Final     ALT   Date Value Ref Range Status   01/12/2022 26 10 - 44 U/L Final     Anion Gap   Date Value Ref Range Status   01/12/2022 9 8 - 16 mmol/L Final     eGFR if    Date Value Ref Range Status   01/12/2022 >60.0 >60 mL/min/1.73 m^2 Final     eGFR if non    Date Value Ref Range Status   01/12/2022 >60.0 >60 mL/min/1.73 m^2 Final     Comment:     Calculation used to obtain the estimated glomerular filtration  rate (eGFR) is the CKD-EPI equation.        @labrcntip(troponini)@  No results found for: BNP}   Lab Results   Component Value Date    CHOL 240 (H) 01/12/2022     Lab Results   Component Value Date    HDL 95 (H) 01/12/2022     Lab Results   Component Value Date    LDLCALC 131.2 01/12/2022     Lab Results   Component Value Date    TRIG 69 01/12/2022     Lab Results   Component Value Date    CHOLHDL 39.6 01/12/2022      Urine 6/2022 negative for protein.    Last Echo: none  Last stress test: none  Cardiovascular angiogram: none  ECG: NSR, rate 62, low voltage. Late transition.  Fundoscopic exam: within the past year, negative for retinopathy    WF came on own for CV evaluation due to brother having recent problem at age 69. Do have premature family history for CAD with mother having first MI at age 51. No CP nor SOB but concern constant leg pain for over 3 years. Type A personality and feels constantly stressed. Overall ASCVD  "10-year event risk of 7.1%.      Review of Systems   Constitutional: Negative for diaphoresis, fever, malaise/fatigue, night sweats and weight gain.   HENT: Positive for congestion, hearing loss, hoarse voice and tinnitus. Negative for nosebleeds.    Eyes: Negative for visual disturbance.   Cardiovascular: Positive for leg swelling and palpitations. Negative for chest pain, claudication, cyanosis, dyspnea on exertion, irregular heartbeat, near-syncope, orthopnea and paroxysmal nocturnal dyspnea.   Respiratory: Positive for sleep disturbances due to breathing and snoring. Negative for cough, shortness of breath and wheezing.         Euless score 6, awaken tired.   Endocrine: Negative for polydipsia and polyuria.   Hematologic/Lymphatic: Bruises/bleeds easily.   Skin: Positive for color change and rash. Negative for flushing, nail changes, poor wound healing and suspicious lesions.   Musculoskeletal: Positive for arthritis, back pain, joint pain, muscle cramps and myalgias. Negative for falls, gout, joint swelling and muscle weakness.   Gastrointestinal: Negative for heartburn, hematemesis, hematochezia, melena and nausea.   Neurological: Positive for disturbances in coordination, excessive daytime sleepiness, dizziness, light-headedness, paresthesias and vertigo. Negative for focal weakness, headaches, loss of balance, numbness and weakness.   Psychiatric/Behavioral: Positive for memory loss. Negative for depression and substance abuse. The patient has insomnia and is nervous/anxious.         Objective:    Physical Exam  Chaperone present: pectus excavatum.   Constitutional:       Appearance: She is well-developed.      Comments: RA O2 sat 94%   HENT:      Head: Normocephalic.   Eyes:      Conjunctiva/sclera: Conjunctivae normal.      Pupils: Pupils are equal, round, and reactive to light.   Neck:      Thyroid: No thyromegaly.      Vascular: No JVD.      Comments: Circumference 12"  Cardiovascular:      Rate and " "Rhythm: Normal rate and regular rhythm.      Pulses: Intact distal pulses.      Heart sounds: Normal heart sounds. No murmur heard.    No friction rub. No gallop.   Pulmonary:      Effort: Pulmonary effort is normal.      Breath sounds: Normal breath sounds. No rales.   Chest:      Chest wall: No tenderness.   Abdominal:      General: Bowel sounds are normal.      Palpations: Abdomen is soft.      Tenderness: There is no abdominal tenderness.      Comments: Waist 27"   Musculoskeletal:         General: Normal range of motion.      Cervical back: Normal range of motion and neck supple.   Lymphadenopathy:      Cervical: No cervical adenopathy.   Skin:     General: Skin is warm and dry.      Findings: No rash.   Neurological:      Mental Status: She is alert and oriented to person, place, and time.           Assessment:       1. Cardiovascular event risk, ASCVD 10-year risk 7.1%, 2022    2. Family history of premature CAD    3. Stress    4. Excessive daytime sleepiness    5. Mild intermittent asthma without complication    6. Essential hypertension, dx 2000    7. Snoring    8. Encounter for screening for cardiovascular disorders    9. Chronic fatigue         Plan:       Diagnoses and all orders for this visit:    Cardiovascular event risk, ASCVD 10-year risk 7.1%, 2022  -     Echo; Future  -     CT Calcium Scoring Cardiac; Future    Family history of premature CAD  -     CT Calcium Scoring Cardiac; Future    Stress    Excessive daytime sleepiness  -     Polysomnogram (CPAP will be added if patient meets diagnostic criteria.); Future    Mild intermittent asthma without complication    Essential hypertension, dx 2000  -     Echo; Future    Snoring  -     Polysomnogram (CPAP will be added if patient meets diagnostic criteria.); Future    Encounter for screening for cardiovascular disorders    Chronic fatigue  -     Polysomnogram (CPAP will be added if patient meets diagnostic criteria.); Future    - All medical issues " reviewed, continue current Rx.  - Warning signs of MI and stroke given, if symptoms last more than 5 minutes, stop immediately and call 911, then chew 2-4 low-dose ASA (81 mg).  - CBT, cognitive behavior therapy for sleep and stress disorder.  - Teach the body to relax, try one of these for at least 30 minutes daily: meditation, deep prayer, gentle yoga, kailey chi, paced breathing or visualizing yourself in a calm, safe place.  - CV status and all medications reviewed, patient acknowledge good understanding.  - Recommend healthy living: moderate alcohol, healthy diet and regular exercise aiming for fitness, restorative sleep and weight control  - Discussed healthy alcohol daily limit of 0.5 oz of pure alcohol in any 24 hours (roughly one 12-oz beers, 5 oz of wine (8%-12% alcohol), or 0.75 oz (half a shot) of liquor (80 proof)), can not save up.  - Need good exercise program, 4 key elements: 1. Aerobic (walking, swimming, dancing, jogging, biking, etc, 2. Muscle strengthening / resistance exercise, need to do 2-3 times weekly, 3. Stretching daily, good stretch takes a whole  total minute. 4. Balance exercise daily.   - Instruction for Mediterranean diet and heart healthy exercise given.  - Check home blood pressure, 2 days weekly, do 2 readings within 5 minutes in AM and PM, keep log for review. Target resting BP is less than 130/85.  - Highly recommend 30-60 minutes of exercise / activities daily, can have Sunday off, with 2-3 sessions of muscle strengthening weekly. A  would be very helpful.  - Recommend at least annual cardiovascular evaluation in view of patient's significant risk factors.  - Phone review / encourage use of [facility]      Greater than 50% of the time was spent in counseling and coordination of care. The above assessment and plan have been discussed at length. Referring provider's note reviewed. Labs and procedure over the last 6 months reviewed. Problem List updated. Asked to bring  in all active medications / pills bottles with next visit.

## 2022-07-29 NOTE — PATIENT INSTRUCTIONS
Recommended Mediterranean dietEating Heart-Healthy Food: Using the DASH Plan  Eating for your heart doesnt have to be hard or boring. You just need to know how to make healthier choices. The DASH eating plan has been developed to help you do just that. DASH stands for Dietary Approaches to Stop Hypertension. It is a plan that has been proven to be healthier for your heart and to lower your risk for high blood pressure. It can also help lower your risk for cancer, heart disease, osteoporosis, and diabetes.  Choosing from Each Food Group  Choose foods from each of the food groups below each day. Try to get the recommended number of servings for each food group. The serving numbers are based on a diet of 2,000 calories a day. Talk to your doctor if youre unsure about your calorie needs.  Grains   Servings: 7-8 a day  A serving is:  1 slice bread  1 ounce dry cereal  half a cup cooked rice or pasta  Best choices: Whole grains and any grains high in fiber.  Vegetables   Servings: 4-5 a day  A serving is:  1 cup raw leafy vegetable  Half a cup cooked vegetable  Three-quarter cup vegetable juice  Best choices: Fresh or frozen vegetable prepared without too much added salt or fat.    Fruits   Servings: 4-5 a day  A serving is:  Three-quarter cup fruit juice  1 medium fruit  One-quarter cup dried fruit  One-half cup fresh, frozen, or canned fruit  Best choices: A variety of fresh fruits of different colors. Whole fruits are a much better choice than fruit juices.  Low-fat or Fat Free Dairy   Servings: 2-3 a day  A serving is:  8 ounces milk  1 cup yogurt  One and a half ounces cheese  Best choices: Skim or 1% milk, low-fat or fat free yogurt or buttermilk, and low-fat cheeses.       Meat, Poultry, Fish   Servings: 2 or fewer a day  A serving is:  3 ounces cooked meat, poultry, or fish  Best choices: Lean meats and fish. Trim away visible fat. Broil, roast, or boil instead of frying. Remove skin from poultry before eating.   Nuts, Seeds, Beans   Servings: 4-5 a week  A serving is:  One third cup nuts (or one and a half ounces)  2 tablespoons sunflower seeds  Half a cup cooked beans  Best choices: Dry roasted nuts with no salt added, lentils, kidney beans, garbanzo beans, and whole burt beans.    Fats and Oils   Servings: 2 a day  A serving is:  1 teaspoon vegetable oil  1 teaspoon soft margarine  1 tablespoon low-fat mayonnaise  1 teaspoon regular mayonnaise  2 tablespoons light salad dressing  1 tablespoon regular salad dressing  Best choices: Monounsaturated and polyunsaturated fats such as olive, canola, or safflower oil.  Sweets   Servings: 5 a week or fewer  A serving is:  1 tablespoon sugar, maple syrup, or honey  1 tablespoon jam or jelly  1 half-ounce jelly beans (about 15)  8 ounces lemonade  Best choices: Dried fruit can be a satisfying sweet. Choose low-fat sweets when possible. And watch your serving sizes!       Aerobic Exercise for a Healthy Heart  Exercise is a lot more than an energy booster and a stress reliever. It also strengthens your heart muscle, lowers your blood pressure and blood cholesterol, and burns calories.      Remember, some activity is better than none.     Choose an Aerobic Activity  Choose a nonstop activity that makes your heart and lungs work harder than they do when you rest or walk normally. This aerobic exercise can improve the way your heart and other muscles use oxygen. Make it fun by exercising with a friend and choosing an activity you enjoy. Here are some ideas:  Walking  Swimming  Bicycling  Stair climbing  Dancing  Jogging  Exercise Regularly  If you havent been exercising regularly,  get your doctors okay first. Then start slowly.  Here are some tips:  Begin exercising 3 times a week for 5-10 minutes at a time.  When you feel comfortable, add a few minutes each week.  Slowly build up to exercising 3-4 times each week for 20-40 minutes. Aim for a total of 150 or more minutes a  week.  Be sure to carry your nitroglycerin with you when you exercise.  If you get angina when youre exercising, stop what youre doing, take your nitroglycerin, and call your doctor.  © 7810-5288 [employer], [address] [place] [address], [place] [address]. All rights reserved. This information is not intended as a substitute for professional medical care. Always follow your healthcare professional's instructions.

## 2022-08-04 ENCOUNTER — OFFICE VISIT (OUTPATIENT)
Dept: FAMILY MEDICINE | Facility: CLINIC | Age: 67
End: 2022-08-04
Payer: MEDICARE

## 2022-08-04 ENCOUNTER — HOSPITAL ENCOUNTER (OUTPATIENT)
Dept: RADIOLOGY | Facility: HOSPITAL | Age: 67
Discharge: HOME | End: 2022-08-04
Attending: INTERNAL MEDICINE
Payer: MEDICARE

## 2022-08-04 ENCOUNTER — HOSPITAL ENCOUNTER (OUTPATIENT)
Dept: CARDIOLOGY | Facility: HOSPITAL | Age: 67
Discharge: HOME | End: 2022-08-04
Attending: INTERNAL MEDICINE
Payer: MEDICARE

## 2022-08-04 VITALS
RESPIRATION RATE: 15 BRPM | HEART RATE: 58 BPM | SYSTOLIC BLOOD PRESSURE: 124 MMHG | OXYGEN SATURATION: 97 % | WEIGHT: 121 LBS | HEIGHT: 65 IN | BODY MASS INDEX: 20.16 KG/M2 | DIASTOLIC BLOOD PRESSURE: 86 MMHG

## 2022-08-04 VITALS — HEIGHT: 65 IN | BODY MASS INDEX: 20.49 KG/M2 | WEIGHT: 123 LBS

## 2022-08-04 DIAGNOSIS — R09.89 OTHER SPECIFIED SYMPTOMS AND SIGNS INVOLVING THE CIRCULATORY AND RESPIRATORY SYSTEMS: ICD-10-CM

## 2022-08-04 DIAGNOSIS — R42 DIZZINESS: ICD-10-CM

## 2022-08-04 DIAGNOSIS — E03.9 ACQUIRED HYPOTHYROIDISM: ICD-10-CM

## 2022-08-04 DIAGNOSIS — Z82.49 FAMILY HISTORY OF HEART DISEASE: ICD-10-CM

## 2022-08-04 DIAGNOSIS — Z91.89 CARDIOVASCULAR EVENT RISK: ICD-10-CM

## 2022-08-04 DIAGNOSIS — M79.604 BILATERAL LEG PAIN: Primary | ICD-10-CM

## 2022-08-04 DIAGNOSIS — I10 ESSENTIAL HYPERTENSION: ICD-10-CM

## 2022-08-04 DIAGNOSIS — J45.20 MILD INTERMITTENT ASTHMA WITHOUT COMPLICATION: ICD-10-CM

## 2022-08-04 DIAGNOSIS — M79.605 BILATERAL LEG PAIN: Primary | ICD-10-CM

## 2022-08-04 DIAGNOSIS — Z82.49 FAMILY HISTORY OF PREMATURE CAD: ICD-10-CM

## 2022-08-04 PROBLEM — R93.1 AGATSTON CORONARY ARTERY CALCIUM SCORE GREATER THAN 400: Status: ACTIVE | Noted: 2022-08-04

## 2022-08-04 LAB
AORTIC ROOT ANNULUS: 3.4 CM
AORTIC VALVE CUSP SEPERATION: 1.7 CM
AV INDEX (PROSTH): 0.56
AV MEAN GRADIENT: 3 MMHG
AV PEAK GRADIENT: 5 MMHG
AV VALVE AREA: 1.69 CM2
AV VELOCITY RATIO: 0.68
BSA FOR ECHO PROCEDURE: 1.6 M2
CV ECHO LV RWT: 0.4 CM
DOP CALC AO PEAK VEL: 1.15 M/S
DOP CALC AO VTI: 29.77 CM
DOP CALC LVOT AREA: 3 CM2
DOP CALC LVOT DIAMETER: 1.96 CM
DOP CALC LVOT PEAK VEL: 0.78 M/S
DOP CALC LVOT STROKE VOLUME: 50.18 CM3
DOP CALC MV VTI: 23.22 CM
DOP CALCLVOT PEAK VEL VTI: 16.64 CM
E WAVE DECELERATION TIME: 198.95 MSEC
E/A RATIO: 1.25
E/E' RATIO: 6.67 M/S
ECHO LV POSTERIOR WALL: 0.98 CM (ref 0.6–1.1)
EJECTION FRACTION: 60 %
FRACTIONAL SHORTENING: 31 % (ref 28–44)
INTERVENTRICULAR SEPTUM: 0.99 CM (ref 0.6–1.1)
IVRT: 45.67 MSEC
LA MAJOR: 4.87 CM
LA MINOR: 3.83 CM
LEFT ATRIUM SIZE: 3.72 CM
LEFT ATRIUM VOLUME INDEX MOD: 23.4 ML/M2
LEFT ATRIUM VOLUME MOD: 37.66 CM3
LEFT INTERNAL DIMENSION IN SYSTOLE: 3.34 CM (ref 2.1–4)
LEFT VENTRICLE DIASTOLIC VOLUME INDEX: 68.91 ML/M2
LEFT VENTRICLE DIASTOLIC VOLUME: 110.94 ML
LEFT VENTRICLE MASS INDEX: 106 G/M2
LEFT VENTRICLE SYSTOLIC VOLUME INDEX: 28.2 ML/M2
LEFT VENTRICLE SYSTOLIC VOLUME: 45.35 ML
LEFT VENTRICULAR INTERNAL DIMENSION IN DIASTOLE: 4.86 CM (ref 3.5–6)
LEFT VENTRICULAR MASS: 170.18 G
LV LATERAL E/E' RATIO: 6.36 M/S
LV SEPTAL E/E' RATIO: 7 M/S
MV A" WAVE DURATION": 7.23 MSEC
MV MEAN GRADIENT: 0 MMHG
MV PEAK A VEL: 0.56 M/S
MV PEAK E VEL: 0.7 M/S
MV PEAK GRADIENT: 2 MMHG
MV STENOSIS PRESSURE HALF TIME: 62.21 MS
MV VALVE AREA BY CONTINUITY EQUATION: 2.16 CM2
MV VALVE AREA P 1/2 METHOD: 3.54 CM2
PISA MRMAX VEL: 0.04 M/S
PISA TR MAX VEL: 2.15 M/S
PV MEAN GRADIENT: 2 MMHG
PV PEAK VELOCITY: 0.9 CM/S
RA MAJOR: 5.18 CM
RA PRESSURE: 3 MMHG
RA WIDTH: 3.1 CM
RIGHT VENTRICULAR END-DIASTOLIC DIMENSION: 2.77 CM
TDI LATERAL: 0.11 M/S
TDI SEPTAL: 0.1 M/S
TDI: 0.11 M/S
TR MAX PG: 18 MMHG
TRICUSPID ANNULAR PLANE SYSTOLIC EXCURSION: 1.29 CM
TV REST PULMONARY ARTERY PRESSURE: 21 MMHG

## 2022-08-04 PROCEDURE — 75571 CT CALCIUM SCORING CARDIAC: ICD-10-PCS | Mod: 26,,, | Performed by: RADIOLOGY

## 2022-08-04 PROCEDURE — 99214 OFFICE O/P EST MOD 30 MIN: CPT | Mod: S$PBB,,, | Performed by: FAMILY MEDICINE

## 2022-08-04 PROCEDURE — 93306 TTE W/DOPPLER COMPLETE: CPT | Mod: 26,,, | Performed by: INTERNAL MEDICINE

## 2022-08-04 PROCEDURE — 93356 MYOCRD STRAIN IMG SPCKL TRCK: CPT

## 2022-08-04 PROCEDURE — 99214 PR OFFICE/OUTPT VISIT, EST, LEVL IV, 30-39 MIN: ICD-10-PCS | Mod: S$PBB,,, | Performed by: FAMILY MEDICINE

## 2022-08-04 PROCEDURE — 99214 OFFICE O/P EST MOD 30 MIN: CPT | Mod: PBBFAC,25 | Performed by: FAMILY MEDICINE

## 2022-08-04 PROCEDURE — 93356 ECHO (CUPID ONLY): ICD-10-PCS | Mod: ,,, | Performed by: INTERNAL MEDICINE

## 2022-08-04 PROCEDURE — 75571 CT HRT W/O DYE W/CA TEST: CPT | Mod: 26,,, | Performed by: RADIOLOGY

## 2022-08-04 PROCEDURE — 99999 PR PBB SHADOW E&M-EST. PATIENT-LVL IV: CPT | Mod: PBBFAC,,, | Performed by: FAMILY MEDICINE

## 2022-08-04 PROCEDURE — 75571 CT HRT W/O DYE W/CA TEST: CPT | Mod: TC

## 2022-08-04 PROCEDURE — 93356 MYOCRD STRAIN IMG SPCKL TRCK: CPT | Mod: ,,, | Performed by: INTERNAL MEDICINE

## 2022-08-04 PROCEDURE — 93306 ECHO (CUPID ONLY): ICD-10-PCS | Mod: 26,,, | Performed by: INTERNAL MEDICINE

## 2022-08-04 PROCEDURE — 99999 PR PBB SHADOW E&M-EST. PATIENT-LVL IV: ICD-10-PCS | Mod: PBBFAC,,, | Performed by: FAMILY MEDICINE

## 2022-08-04 RX ORDER — ROSUVASTATIN CALCIUM 10 MG/1
10 TABLET, COATED ORAL DAILY
Qty: 90 TABLET | Refills: 3 | Status: SHIPPED | OUTPATIENT
Start: 2022-08-04 | End: 2023-08-07 | Stop reason: SDUPTHER

## 2022-08-04 NOTE — PROGRESS NOTES
[facility] - Clinic Note    Subjective      Ms. Jackson is a 67 y.o. female who presents to clinic for a follow up of chronic conditions.     H/o HTN: currently on amlodipine and metoprolol.   BP well controlled today.   Compliant with med.      Hypothyroidism: takes levothyroxine.      Reports seasonal asthma. Will occasionally have to use breo. No recent use.   Previous Pulm was Dr. Alejo.     Complains of bilateral leg pain. Constant pain. Worse with exertion.   No alleviating factors.   Has tried OTC meds.   She also reports dizziness which is a chronic problem    Strong family history of heart disease and MIs    PMH Edith has a past medical history of Asthma, Hypertension, Hypotension, iatrogenic, Osteopenia, Respiratory distress, and Thyroid disease.   PSXH Edith has a past surgical history that includes Hip replacement arthroplasty (Right); Breast reconstruction; Augmentation of breast (Bilateral); and Colonoscopy (N/A, 3/17/2022).    Edith's family history includes Diabetes in her mother; Heart attack in her father and mother; Stroke in her mother; Thyroid disease in her mother.    Edith reports that she has never smoked. She has never used smokeless tobacco. She reports current alcohol use. She reports previous drug use.   BRYANNA Sharma is allergic to percocet [oxycodone-acetaminophen] and penicillins.   DAVID Sharma has a current medication list which includes the following prescription(s): amlodipine, estradiol, levothyroxine, metoprolol succinate, trazodone, breo ellipta, and rosuvastatin.     Review of Systems   Constitutional: Negative for activity change, appetite change, chills, fatigue and fever.   Eyes: Negative for visual disturbance.   Respiratory: Negative for cough and shortness of breath.    Cardiovascular: Negative for chest pain, palpitations and leg swelling.   Gastrointestinal: Negative for abdominal pain, nausea and vomiting.   Musculoskeletal: Positive for  "arthralgias.        Bilateral leg pain   Skin: Negative for wound.   Neurological: Positive for dizziness. Negative for headaches.   Psychiatric/Behavioral: Negative for confusion.     Objective     /86 (BP Location: Left arm, Patient Position: Sitting, BP Method: Medium (Manual))   Pulse (!) 58   Resp 15   Ht 5' 5" (1.651 m)   Wt 54.9 kg (121 lb)   LMP  (LMP Unknown)   SpO2 97%   BMI 20.14 kg/m²     Physical Exam   Constitutional: She appears well-developed and well-nourished.  Non-toxic appearance. She does not appear ill. No distress.   HENT:   Head: Normocephalic and atraumatic.   Eyes: Right eye exhibits no discharge. Left eye exhibits no discharge.   Cardiovascular: Normal rate, regular rhythm, normal heart sounds and normal pulses. Exam reveals no gallop and no friction rub.   No murmur heard.Pulmonary:      Effort: Pulmonary effort is normal. No respiratory distress.      Breath sounds: Normal breath sounds. No wheezing, rhonchi or rales.     Abdominal: Normal appearance.   Musculoskeletal:      Cervical back: Neck supple.   Lymphadenopathy:     She has no cervical adenopathy.   Neurological: She is alert.   Skin: Skin is warm and dry. Capillary refill takes less than 2 seconds. She is not diaphoretic.   Psychiatric: Her behavior is normal. Mood, judgment and thought content normal.   Vitals reviewed.     Assessment/Plan     Edith was seen today for follow-up.    Diagnoses and all orders for this visit:    Bilateral leg pain  -     US Lower Extrem Arteries Bilat with ALEX (xpd); Future    Family history of heart disease  -     rosuvastatin (CRESTOR) 10 MG tablet; Take 1 tablet (10 mg total) by mouth once daily.    Dizziness  -     US Carotid Bilateral; Future    Other specified symptoms and signs involving the circulatory and respiratory systems   -     US Carotid Bilateral; Future    Essential hypertension    Acquired hypothyroidism    Mild intermittent asthma without " complication        Follow up in about 6 months (around 2/4/2023), or if symptoms worsen or fail to improve.    Future Appointments   Date Time Provider Department Center   8/30/2022  9:15 AM [address]   8/30/2022 10:00 AM [address]   2/6/2023 10:20 AM Shanice Yang [place] [facility]   7/31/2023 10:00 AM Lane Omalley [place] [facility] [place] [place] [address]       Shanice Yang MD  Family Medicine  [facility]

## 2022-08-10 ENCOUNTER — PATIENT MESSAGE (OUTPATIENT)
Dept: FAMILY MEDICINE | Facility: CLINIC | Age: 67
End: 2022-08-10
Payer: MEDICARE

## 2022-08-10 DIAGNOSIS — R35.0 URINARY FREQUENCY: Primary | ICD-10-CM

## 2022-08-10 RX ORDER — NITROFURANTOIN 25; 75 MG/1; MG/1
100 CAPSULE ORAL 2 TIMES DAILY
Qty: 14 CAPSULE | Refills: 0 | Status: SHIPPED | OUTPATIENT
Start: 2022-08-10 | End: 2022-08-17

## 2022-08-11 ENCOUNTER — LAB VISIT (OUTPATIENT)
Dept: LAB | Facility: HOSPITAL | Age: 67
End: 2022-08-11
Attending: FAMILY MEDICINE
Payer: MEDICARE

## 2022-08-11 DIAGNOSIS — R35.0 URINARY FREQUENCY: ICD-10-CM

## 2022-08-11 LAB
BACTERIA #/AREA URNS HPF: ABNORMAL /HPF
BILIRUB UR QL STRIP: NEGATIVE
CLARITY UR: CLEAR
COLOR UR: YELLOW
GLUCOSE UR QL STRIP: NEGATIVE
HGB UR QL STRIP: ABNORMAL
KETONES UR QL STRIP: NEGATIVE
LEUKOCYTE ESTERASE UR QL STRIP: NEGATIVE
MICROSCOPIC COMMENT: ABNORMAL
NITRITE UR QL STRIP: POSITIVE
PH UR STRIP: 6 [PH] (ref 5–8)
PROT UR QL STRIP: NEGATIVE
RBC #/AREA URNS HPF: 5 /HPF (ref 0–4)
SP GR UR STRIP: 1.02 (ref 1–1.03)
URN SPEC COLLECT METH UR: ABNORMAL
UROBILINOGEN UR STRIP-ACNC: NEGATIVE EU/DL
WBC #/AREA URNS HPF: 4 /HPF (ref 0–5)

## 2022-08-11 PROCEDURE — 81000 URINALYSIS NONAUTO W/SCOPE: CPT | Performed by: FAMILY MEDICINE

## 2022-08-30 ENCOUNTER — HOSPITAL ENCOUNTER (OUTPATIENT)
Dept: RADIOLOGY | Facility: HOSPITAL | Age: 67
Discharge: HOME | End: 2022-08-30
Attending: FAMILY MEDICINE
Payer: MEDICARE

## 2022-08-30 DIAGNOSIS — R09.89 OTHER SPECIFIED SYMPTOMS AND SIGNS INVOLVING THE CIRCULATORY AND RESPIRATORY SYSTEMS: ICD-10-CM

## 2022-08-30 DIAGNOSIS — M79.605 BILATERAL LEG PAIN: ICD-10-CM

## 2022-08-30 DIAGNOSIS — M79.604 BILATERAL LEG PAIN: ICD-10-CM

## 2022-08-30 DIAGNOSIS — R42 DIZZINESS: ICD-10-CM

## 2022-08-30 PROCEDURE — 93922 UPR/L XTREMITY ART 2 LEVELS: CPT | Mod: 26,,, | Performed by: RADIOLOGY

## 2022-08-30 PROCEDURE — 93925 LOWER EXTREMITY STUDY: CPT | Mod: 26,,, | Performed by: RADIOLOGY

## 2022-08-30 PROCEDURE — 93880 EXTRACRANIAL BILAT STUDY: CPT | Mod: 26,,, | Performed by: RADIOLOGY

## 2022-08-30 PROCEDURE — 93925 US ARTERIAL LOWER EXTREMITY BILAT WITH ABI (XPD): ICD-10-PCS | Mod: 26,,, | Performed by: RADIOLOGY

## 2022-08-30 PROCEDURE — 93880 EXTRACRANIAL BILAT STUDY: CPT | Mod: TC

## 2022-08-30 PROCEDURE — 93925 LOWER EXTREMITY STUDY: CPT | Mod: TC

## 2022-08-30 PROCEDURE — 93880 US CAROTID BILATERAL: ICD-10-PCS | Mod: 26,,, | Performed by: RADIOLOGY

## 2022-08-30 PROCEDURE — 93922 US ARTERIAL LOWER EXTREMITY BILAT WITH ABI (XPD): ICD-10-PCS | Mod: 26,,, | Performed by: RADIOLOGY

## 2022-09-01 ENCOUNTER — OFFICE VISIT (OUTPATIENT)
Dept: FAMILY MEDICINE | Facility: CLINIC | Age: 67
End: 2022-09-01
Payer: MEDICARE

## 2022-09-01 VITALS
TEMPERATURE: 98 F | DIASTOLIC BLOOD PRESSURE: 68 MMHG | SYSTOLIC BLOOD PRESSURE: 120 MMHG | OXYGEN SATURATION: 98 % | RESPIRATION RATE: 14 BRPM | BODY MASS INDEX: 20.49 KG/M2 | HEART RATE: 62 BPM | WEIGHT: 123 LBS | HEIGHT: 65 IN

## 2022-09-01 DIAGNOSIS — S16.1XXA STRAIN OF NECK MUSCLE, INITIAL ENCOUNTER: Primary | ICD-10-CM

## 2022-09-01 DIAGNOSIS — I10 ESSENTIAL HYPERTENSION: ICD-10-CM

## 2022-09-01 DIAGNOSIS — R93.1 AGATSTON CORONARY ARTERY CALCIUM SCORE GREATER THAN 400: ICD-10-CM

## 2022-09-01 DIAGNOSIS — J45.20 MILD INTERMITTENT ASTHMA WITHOUT COMPLICATION: ICD-10-CM

## 2022-09-01 PROBLEM — L57.8 PHOTOAGED SKIN: Status: ACTIVE | Noted: 2018-02-01

## 2022-09-01 PROBLEM — Z12.11 ENCOUNTER FOR SCREENING COLONOSCOPY: Status: RESOLVED | Noted: 2022-03-17 | Resolved: 2022-09-01

## 2022-09-01 PROCEDURE — 99214 OFFICE O/P EST MOD 30 MIN: CPT | Mod: S$PBB,,, | Performed by: FAMILY MEDICINE

## 2022-09-01 PROCEDURE — 99999 PR PBB SHADOW E&M-EST. PATIENT-LVL III: CPT | Mod: PBBFAC,,, | Performed by: FAMILY MEDICINE

## 2022-09-01 PROCEDURE — 99213 OFFICE O/P EST LOW 20 MIN: CPT | Mod: PBBFAC | Performed by: FAMILY MEDICINE

## 2022-09-01 PROCEDURE — 99214 PR OFFICE/OUTPT VISIT, EST, LEVL IV, 30-39 MIN: ICD-10-PCS | Mod: S$PBB,,, | Performed by: FAMILY MEDICINE

## 2022-09-01 PROCEDURE — 99999 PR PBB SHADOW E&M-EST. PATIENT-LVL III: ICD-10-PCS | Mod: PBBFAC,,, | Performed by: FAMILY MEDICINE

## 2022-09-01 RX ORDER — METHYLPREDNISOLONE 4 MG/1
TABLET ORAL
Qty: 21 EACH | Refills: 0 | Status: SHIPPED | OUTPATIENT
Start: 2022-09-01 | End: 2022-09-22

## 2022-09-01 RX ORDER — AMLODIPINE BESYLATE 5 MG/1
5 TABLET ORAL DAILY
Qty: 90 TABLET | Refills: 1 | Status: SHIPPED | OUTPATIENT
Start: 2022-09-01 | End: 2023-02-24 | Stop reason: SDUPTHER

## 2022-09-01 RX ORDER — METHOCARBAMOL 750 MG/1
750 TABLET, FILM COATED ORAL 4 TIMES DAILY
Qty: 40 TABLET | Refills: 0 | Status: SHIPPED | OUTPATIENT
Start: 2022-09-01 | End: 2022-09-11

## 2022-09-01 RX ORDER — FLUTICASONE FUROATE AND VILANTEROL 100; 25 UG/1; UG/1
1 POWDER RESPIRATORY (INHALATION) DAILY
Qty: 180 EACH | Refills: 1 | Status: SHIPPED | OUTPATIENT
Start: 2022-09-01 | End: 2022-10-25

## 2022-09-01 NOTE — PROGRESS NOTES
Subjective:       Patient ID: Edith Jackson is a 67 y.o. female.    Chief Complaint: Neck Pain (Right sie x a few weeks ago)    Neck pain, needs several meds refilled, has questions about her recent ultrasounds.    Neck Pain   This is a new problem. The current episode started 1 to 4 weeks ago. The problem occurs constantly. The problem has been unchanged. Associated with: went ziplining a few weeks ago. The quality of the pain is described as aching (throbbing). The symptoms are aggravated by twisting. Pertinent negatives include no fever, numbness, syncope or weakness. She has tried heat, ice and NSAIDs for the symptoms. The treatment provided mild relief.   Review of Systems   Constitutional:  Negative for chills, fever and unexpected weight change.   Cardiovascular:  Negative for syncope.   Musculoskeletal:  Positive for neck pain and neck stiffness.   Skin:  Negative for rash.   Neurological:  Negative for weakness and numbness.     Past Medical History:   Diagnosis Date    Asthma     Hypertension     Hypotension, iatrogenic     Osteopenia     Respiratory distress     Thyroid disease      Past Surgical History:   Procedure Laterality Date    AUGMENTATION OF BREAST Bilateral     removal    BREAST RECONSTRUCTION      COLONOSCOPY N/A 3/17/2022    Procedure: COLONOSCOPY;  Surgeon: Alessandra Springer MD;  Location: [facility];  Service: General;  Laterality: N/A;    HIP REPLACEMENT ARTHROPLASTY Right      Social History     Socioeconomic History    Marital status: Single   Tobacco Use    Smoking status: Never    Smokeless tobacco: Never   Substance and Sexual Activity    Alcohol use: Yes     Comment: socially    Drug use: Not Currently     Social Determinants of Health     Financial Resource Strain: Low Risk     Difficulty of Paying Living Expenses: Not very hard   Food Insecurity: Unknown    Worried About Running Out of Food in the Last Year: Patient refused    Ran Out of Food in the Last Year: Patient refused  "  Transportation Needs: Unknown    Lack of Transportation (Medical): Patient refused    Lack of Transportation (Non-Medical): Patient refused   Physical Activity: Unknown    Days of Exercise per Week: Patient refused   Stress: Unknown    Feeling of Stress : Patient refused   Social Connections: Unknown    Frequency of Communication with Friends and Family: Patient refused    Frequency of Social Gatherings with Friends and Family: Patient refused    Active Member of Clubs or Organizations: Patient refused    Attends Club or Organization Meetings: Patient refused    Marital Status: Patient refused   Housing Stability: Unknown    Unable to Pay for Housing in the Last Year: Patient refused    Unstable Housing in the Last Year: Patient refused     Family History   Problem Relation Age of Onset    Diabetes Mother     Stroke Mother     Heart attack Mother     Thyroid disease Mother     Heart attack Father     Breast cancer Neg Hx     Ovarian cancer Neg Hx        Objective:      /68 (BP Location: Left arm, Patient Position: Sitting, BP Method: Medium (Manual))   Pulse 62   Temp 98 °F (36.7 °C) (Temporal)   Resp 14   Ht 5' 5" (1.651 m)   Wt 55.8 kg (123 lb)   LMP  (LMP Unknown)   SpO2 98%   BMI 20.47 kg/m²   Physical Exam  Vitals reviewed.   Constitutional:       General: She is not in acute distress.     Appearance: She is normal weight. She is not toxic-appearing.   Cardiovascular:      Rate and Rhythm: Normal rate.   Pulmonary:      Effort: Pulmonary effort is normal. No respiratory distress.   Skin:     Coloration: Skin is not jaundiced.   Neurological:      Mental Status: She is alert.      Gait: Gait normal.   Psychiatric:         Mood and Affect: Mood normal.         Behavior: Behavior normal.       Assessment:       1. Strain of neck muscle, initial encounter    2. Essential hypertension    3. Mild intermittent asthma without complication    4. Agatston coronary artery calcium score greater than 400, " 445 in 2022        Plan:       Medrol dose you and muscle relaxer for neck strain. Amlodipine and Breo refilled. Patient encouraged to reach out to Dr. Omalley via the portal to discuss her ultrasound results and the potential need for aspirin 81 mg or something stronger. Has pcp f/u in Feb 2023.    Strain of neck muscle, initial encounter  -     methocarbamoL (ROBAXIN) 750 MG Tab; Take 1 tablet (750 mg total) by mouth 4 (four) times daily. for 10 days  Dispense: 40 tablet; Refill: 0  -     methylPREDNISolone (MEDROL DOSEPACK) 4 mg tablet; use as directed  Dispense: 21 each; Refill: 0    Essential hypertension  -     amLODIPine (NORVASC) 5 MG tablet; Take 1 tablet (5 mg total) by mouth once daily.  Dispense: 90 tablet; Refill: 1    Mild intermittent asthma without complication  -     fluticasone furoate-vilanteroL (BREO ELLIPTA) 100-25 mcg/dose diskus inhaler; Inhale 1 puff into the lungs once daily.  Dispense: 180 each; Refill: 1    Agatston coronary artery calcium score greater than 400, 445 in 2022          Risks, benefits, and side effects were discussed with the patient. All questions were answered to the fullest satisfaction of the patient, and pt verbalized understanding and agreement to treatment plan. Pt was to call with any new or worsening symptoms, or present to the ER.

## 2022-09-08 ENCOUNTER — PATIENT MESSAGE (OUTPATIENT)
Dept: FAMILY MEDICINE | Facility: CLINIC | Age: 67
End: 2022-09-08
Payer: MEDICARE

## 2022-09-12 ENCOUNTER — PATIENT MESSAGE (OUTPATIENT)
Dept: FAMILY MEDICINE | Facility: CLINIC | Age: 67
End: 2022-09-12
Payer: MEDICARE

## 2022-09-13 ENCOUNTER — CLINICAL SUPPORT (OUTPATIENT)
Dept: REHABILITATION | Facility: HOSPITAL | Age: 67
End: 2022-09-13
Payer: MEDICARE

## 2022-09-13 DIAGNOSIS — M54.2 NECK PAIN: ICD-10-CM

## 2022-09-13 DIAGNOSIS — S16.1XXA STRAIN OF NECK MUSCLE, INITIAL ENCOUNTER: ICD-10-CM

## 2022-09-13 PROCEDURE — 97110 THERAPEUTIC EXERCISES: CPT | Mod: PN

## 2022-09-13 PROCEDURE — 97161 PT EVAL LOW COMPLEX 20 MIN: CPT | Mod: PN

## 2022-09-13 NOTE — PLAN OF CARE
[facility]   Physical Therapy Initial Evaluation     Date: 9/13/2022   Name: Edith Jackson  Clinic Number: [id number]    Therapy Diagnosis:   Encounter Diagnoses   Name Primary?    Strain of neck muscle, initial encounter     Neck pain      Physician: Rojas Victor MD    Physician Orders: PT Eval and Treat   Medical Diagnosis from Referral: Strain of neck muscle.  Evaluation Date: 9/13/2022  Authorization Period Expiration: 9/10/23  Plan of Care Expiration: 12/16/22  Progress Note Due: 10/12/22  Visit # / Visits authorized: 1/ 1   FOTO: 54/100    Precautions: Standard     Time In: 1700  Time Out: 1745  Total Appointment Time (timed & untimed codes): 45 minutes      SUBJECTIVE     Date of onset: ~ 8/17/22.    History of current condition - Edith reports: neck pain stared ~ 8/17/22 after a zip line ride.Pain is not decreasing.Pt reports difficulties with ADLs,functional activities,homemaking,driving.    Falls: no    Imaging, none:     Prior Therapy: None.  Social History: in 2 [place] house  lives with a friend  Occupation: Not working.  Prior Level of Function: Independent.  Current Level of Function: Modified independent.    Pain:  Current 3/10, worst 10/10, best 3/10   Location: bilateral neck    Description: Aching, Dull, Sharp, and Variable  Aggravating Factors: Bending, Extension, Flexing, and Lifting  Easing Factors: relaxation, pain medication, and rest    Patients goals: no pain     Medical History:   Past Medical History:   Diagnosis Date    Asthma     Hypertension     Hypotension, iatrogenic     Osteopenia     Respiratory distress     Thyroid disease        Surgical History:   Edith Jackson  has a past surgical history that includes Hip replacement arthroplasty (Right); Breast reconstruction; Augmentation of breast (Bilateral); and Colonoscopy (N/A, 3/17/2022).    Medications:   Edith has a current medication list which includes the following prescription(s):  amlodipine, estradiol, fluticasone furoate-vilanterol, levothyroxine, methylprednisolone, metoprolol succinate, rosuvastatin, and trazodone.    Allergies:   Review of patient's allergies indicates:   Allergen Reactions    Percocet [oxycodone-acetaminophen] Other (See Comments)     Headaches    Penicillins Rash          OBJECTIVE         Cervical AROM: Pain/Dysfunction with Movement:   Flexion     30    Extension  40    Right side bending  70    Left side bending   70    Right rotation   20    Left rotation   25      Strength of c/spine is grossly 3-/5 in all planes.  Posture;head forward,scoliosis.  Special tests;compression test on c/spine;neg.  C5 test;neg bilaterally.   ; (2) LT;47, RT;44# pt is right handed.  Palpation;cervical multifidus and upper traps tender.    Limitation/Restriction for FOTO neck Survey    Therapist reviewed FOTO scores for Edith Jackson on 9/13/2022.   FOTO documents entered into [employer] - see Media section.    Limitation Score: 46%         TREATMENT     Total Treatment time (time-based codes) separate from Evaluation: 8 minutes      Edith received the treatments listed below:      therapeutic exercises to develop strength for 8 minutes including:  UBE 4X4'.    PATIENT EDUCATION AND HOME EXERCISES     Education provided:   - Posture ed.  Role of PT.POC.    ASSESSMENT     Edith is a 67 y.o. female referred to outpatient Physical Therapy with a medical diagnosis of neck strain. Patient presents with ROM and strength deficits,poor posture,impaired function due to pain and above listed deficits.    Patient prognosis is Good.   Patient will benefit from skilled outpatient Physical Therapy to address the deficits stated above and in the chart below, provide patient /family education, and to maximize patientt's level of independence.     Plan of care discussed with patient: Yes  Patient's spiritual, cultural and educational needs considered and patient is agreeable to the plan of care  and goals as stated below:     Anticipated Barriers for therapy: no    Medical Necessity is demonstrated by the following  History  Co-morbidities and personal factors that may impact the plan of care Co-morbidities:   prior hip surgery BISHNU RT    Personal Factors:   no deficits     low   Examination  Body Structures and Functions, activity limitations and participation restrictions that may impact the plan of care Body Regions:   neck    Body Systems:    gross symmetry  ROM  strength    Participation Restrictions:   NO, pt will be out of town x 2 wks,     Activity limitations:   Learning and applying knowledge  no deficits    General Tasks and Commands  no deficits    Communication  no deficits    Mobility  no deficits    Self care  no deficits    Domestic Life  no deficits    Interactions/Relationships  no deficits    Life Areas  no deficits    Community and Social Life  no deficits         low   Clinical Presentation stable and uncomplicated low   Decision Making/ Complexity Score: low     Goals:  Short Term Goals (STG) # weeks Goal Review Date Reviewed Date Met   Pt will demonstrate independence with initial HEP to facilitate therex progression and improvements in functional mobility 2 Initial 9/13/2022    Pt will increase cervical active range of motion to greater than 5 degrees for all planes to facilitate driving safety 4 Initial 9/13/2022    Pt will decrease pain x 1 grade. 4 Initial 9/13/2022 9/13/2022 9/13/2022 9/13/2022 9/13/2022 9/13/2022 9/13/2022 9/13/2022      Long Term Goals (LTG) # weeks Goal Review Date Reviewed Date Met   The patient will improve the Neck Disability Index to less than 40% Disability 8 Initial 9/13/2022    The patient will demonstrate increased cervical active range of motion to greater than 10 degrees for all planes in order for patient to drive safely without limitations 8 Initial 9/13/2022    Tolerable pain level 0-4/10 for activity  tolerance. 8 Initial 9/13/2022    Strength of c/spine 5/5. 8 Initial 9/13/2022    Pt will return to previous LEVON. 8 Initial 9/13/2022 9/13/2022 9/13/2022 9/13/2022 9/13/2022 9/13/2022       PLAN   Plan of care Certification: 9/13/2022 to 9/16/22.    Outpatient Physical Therapy 2 times weekly for 8 weeks to include the following interventions: Cervical/Lumbar Traction, Electrical Stimulation PRN, Manual Therapy, Moist Heat/ Ice, Patient Education, Therapeutic Activities, Therapeutic Exercise, Ultrasound, and dry needling PRN.IMS PRN. .     Abhinav Lee, PT      I CERTIFY THE NEED FOR THESE SERVICES FURNISHED UNDER THIS PLAN OF TREATMENT AND WHILE UNDER MY CARE   Physician's comments:     Physician's Signature: ___________________________________________________

## 2022-09-20 ENCOUNTER — CLINICAL SUPPORT (OUTPATIENT)
Dept: REHABILITATION | Facility: HOSPITAL | Age: 67
End: 2022-09-20
Payer: MEDICARE

## 2022-09-20 DIAGNOSIS — M54.2 NECK PAIN: Primary | ICD-10-CM

## 2022-09-20 PROCEDURE — 97012 MECHANICAL TRACTION THERAPY: CPT | Mod: PN

## 2022-09-20 PROCEDURE — 97110 THERAPEUTIC EXERCISES: CPT | Mod: PN

## 2022-09-20 NOTE — PROGRESS NOTES
[facility]   Physical Therapy Treatment Note     Name: Edith Jackson  Clinic Number: [id number]    Therapy Diagnosis:   Encounter Diagnosis   Name Primary?    Neck pain Yes     Physician: Rojas Victor MD    Visit Date: 9/20/2022    Physician Orders: PT Eval and Treat   Medical Diagnosis from Referral: Strain of neck muscle.  Evaluation Date: 9/13/2022  Authorization Period Expiration: 9/10/23  Plan of Care Expiration: 12/16/22  Progress Note Due: 10/12/22  Visit # / Visits authorized: 2/ 1   FOTO: 54/100     Precautions: Standard      PTA Visit #: 0/5     Time In: 1000  Time Out: 1050  Total Billable Time: 40 minutes    SUBJECTIVE     Pt reports: Increased pain today..  She was compliant with home exercise program.  Response to previous treatment: UBE aggravated problem.  Functional change: No.    Pain: 6/10  Location: bilateral neck      OBJECTIVE     Objective Measures updated at progress report unless specified.     Treatment     Edith received the treatments listed below:      therapeutic exercises to develop strength, endurance, ROM, flexibility, posture, and core stabilization for 25 minutes including:  UBE 3/3'  OH pulley 4'  CC 3# SHOULDER EXT/ROWING 30.  Shoulder shrugs/rolls/retractions 30 each  ROM C/SPINE FB/BB, SBL/RT, LR/RR 20 each    supervised modalities after being cleared for contradictions: Mechanical Traction:  Edith received intermittent mechanical traction to the cervical spine at a force of 12/2 pounds for a total of 15 minutes. Hold time of 40s and rest time for 10s. Patient tolerated treatment well without any adverse effects.      Patient Education and Home Exercises     Home Exercises Provided and Patient Education Provided     Education provided:   - Posture ed.    ASSESSMENT     Performed exercises well.    Edith Is progressing well towards her goals.   Pt prognosis is Good.     Pt will continue to benefit from skilled outpatient physical  therapy to address the deficits listed in the problem list box on initial evaluation, provide pt/family education and to maximize pt's level of independence in the home and community environment.     Pt's spiritual, cultural and educational needs considered and pt agreeable to plan of care and goals.     Anticipated barriers to physical therapy: no    Goals:   Short Term Goals (STG) # weeks Goal Review Date Reviewed Date Met   Pt will demonstrate independence with initial HEP to facilitate therex progression and improvements in functional mobility 2 Initial 9/13/2022     Pt will increase cervical active range of motion to greater than 5 degrees for all planes to facilitate driving safety 4 Initial 9/13/2022     Pt will decrease pain x 1 grade. 4 Initial 9/13/2022 9/13/2022 9/13/2022 9/13/2022 9/13/2022 9/13/2022 9/13/2022 9/13/2022        Long Term Goals (LTG) # weeks Goal Review Date Reviewed Date Met   The patient will improve the Neck Disability Index to less than 40% Disability 8 Initial 9/13/2022     The patient will demonstrate increased cervical active range of motion to greater than 10 degrees for all planes in order for patient to drive safely without limitations 8 Initial 9/13/2022     Tolerable pain level 0-4/10 for activity tolerance. 8 Initial 9/13/2022     Strength of c/spine 5/5. 8 Initial 9/13/2022     Pt will return to previous LEVON. 8 Initial 9/13/2022 9/13/2022 9/13/2022               PLAN     Per POC.    Abhinav Lee, PT

## 2022-09-22 ENCOUNTER — CLINICAL SUPPORT (OUTPATIENT)
Dept: REHABILITATION | Facility: HOSPITAL | Age: 67
End: 2022-09-22
Payer: MEDICARE

## 2022-09-22 DIAGNOSIS — M54.2 NECK PAIN: Primary | ICD-10-CM

## 2022-09-22 PROCEDURE — 97012 MECHANICAL TRACTION THERAPY: CPT | Mod: PN,CQ

## 2022-09-22 PROCEDURE — 97110 THERAPEUTIC EXERCISES: CPT | Mod: PN,CQ

## 2022-09-22 NOTE — PROGRESS NOTES
[facility]ER[facility] AND [facility]   Physical Therapy Treatment Note     Name: Edith Jackson  Clinic Number: [id number]    Therapy Diagnosis:   Encounter Diagnosis   Name Primary?    Neck pain Yes     Physician: Rojas Victor MD    Visit Date: 9/22/2022    Physician Orders: PT Eval and Treat   Medical Diagnosis from Referral: Strain of neck muscle.  Evaluation Date: 9/13/2022  Authorization Period Expiration: 9/10/23  Plan of Care Expiration: 12/16/22  Progress Note Due: 10/12/22  Visit # / Visits authorized: 2/ 20   FOTO: 54/100     Precautions: Standard      PTA Visit #: 1/5     Time In: 1000  Time Out: 1055  Total Billable Time: 55 minutes    SUBJECTIVE     Pt reports: more so tightness than pain. Cannot get it quite loose enough.   She was compliant with home exercise program.  Response to previous treatment: none stated   Functional change: ongoing     Pain: 5/10  Location: bilateral neck      OBJECTIVE     Objective Measures updated at progress report unless specified.     Treatment     Edith received the treatments listed below:      therapeutic exercises to develop strength, endurance, ROM, flexibility, posture, and core stabilization for 35 minutes including:    UBE 3/3'-- not today  OH pulley 4'  CC 3# SHOULDER EXT/ROWING 30.  Chin tucks x 30   Shoulder shrugs/rolls/retractions 30 each  Thoracic extension over red bolster x 20   Upper trap stretch 3 x 30 sec Bilateral   Levator scap stretch 3 x 30 sec Bilateral   ROM C/SPINE FB/BB, SBL/RT, LR/RR 30 each      supervised modalities after being cleared for contradictions: Mechanical Traction:  Edith received intermittent mechanical traction to the cervical spine at a force of 14/7 pounds for a total of 20 minutes. Hold time of 40s and rest time for 10s. Patient tolerated treatment well without any adverse effects.      Patient Education and Home Exercises     Home Exercises Provided and Patient Education Provided     Education provided:   -  Posture ed, home exercise program     ASSESSMENT     Patient tolerated treatment well with focus on cervical flexibility and postural awareness. Favorable response to cervical mechanical traction with no adverse effects. Home exercise given this session. Continue to progress as tolerated.     Edith Is progressing well towards her goals.   Pt prognosis is Good.     Pt will continue to benefit from skilled outpatient physical therapy to address the deficits listed in the problem list box on initial evaluation, provide pt/family education and to maximize pt's level of independence in the home and community environment.     Pt's spiritual, cultural and educational needs considered and pt agreeable to plan of care and goals.     Anticipated barriers to physical therapy: no    Goals:   Short Term Goals (STG) # weeks Goal Review Date Reviewed Status    Pt will demonstrate independence with initial HEP to facilitate therex progression and improvements in functional mobility 2 Initial 9/13/2022  Ongoing    Pt will increase cervical active range of motion to greater than 5 degrees for all planes to facilitate driving safety 4 Initial 9/13/2022  Ongoing   Pt will decrease pain x 1 grade. 4 Initial 9/13/2022  Ongoing                                                                                   Long Term Goals (LTG) # weeks Goal Review Date Reviewed Date Met   The patient will improve the Neck Disability Index to less than 40% Disability 8 Initial 9/13/2022  Ongoing    The patient will demonstrate increased cervical active range of motion to greater than 10 degrees for all planes in order for patient to drive safely without limitations 8 Initial 9/13/2022  Ongoing   Tolerable pain level 0-4/10 for activity tolerance. 8 Initial 9/13/2022  Ongoing   Strength of c/spine 5/5. 8 Initial 9/13/2022  Ongoing   Pt will return to previous LEVON. 8 Initial 9/13/2022  Ongoing                                    PLAN     Per MARCI.    Phylicia  Sarah, PTA

## 2022-10-06 ENCOUNTER — HOSPITAL ENCOUNTER (EMERGENCY)
Facility: HOSPITAL | Age: 67
Discharge: HOME | End: 2022-10-06
Attending: FAMILY MEDICINE
Payer: MEDICARE

## 2022-10-06 ENCOUNTER — OFFICE VISIT (OUTPATIENT)
Dept: FAMILY MEDICINE | Facility: CLINIC | Age: 67
End: 2022-10-06
Payer: MEDICARE

## 2022-10-06 VITALS
DIASTOLIC BLOOD PRESSURE: 76 MMHG | TEMPERATURE: 99 F | HEIGHT: 64 IN | RESPIRATION RATE: 20 BRPM | OXYGEN SATURATION: 98 % | BODY MASS INDEX: 20.49 KG/M2 | SYSTOLIC BLOOD PRESSURE: 120 MMHG | HEART RATE: 104 BPM | WEIGHT: 120 LBS

## 2022-10-06 VITALS
RESPIRATION RATE: 18 BRPM | HEIGHT: 65 IN | HEART RATE: 78 BPM | SYSTOLIC BLOOD PRESSURE: 110 MMHG | DIASTOLIC BLOOD PRESSURE: 73 MMHG | OXYGEN SATURATION: 99 % | TEMPERATURE: 98 F | WEIGHT: 122 LBS | BODY MASS INDEX: 20.33 KG/M2

## 2022-10-06 DIAGNOSIS — R31.29 MICROSCOPIC HEMATURIA: ICD-10-CM

## 2022-10-06 DIAGNOSIS — R05.9 COUGH: ICD-10-CM

## 2022-10-06 DIAGNOSIS — R50.9 ACUTE FEBRILE ILLNESS: ICD-10-CM

## 2022-10-06 DIAGNOSIS — J01.00 ACUTE NON-RECURRENT MAXILLARY SINUSITIS: Primary | ICD-10-CM

## 2022-10-06 DIAGNOSIS — G44.209 TENSION-TYPE HEADACHE, NOT INTRACTABLE, UNSPECIFIED CHRONICITY PATTERN: ICD-10-CM

## 2022-10-06 DIAGNOSIS — J18.9 PNEUMONIA DUE TO INFECTIOUS ORGANISM, UNSPECIFIED LATERALITY, UNSPECIFIED PART OF LUNG: Primary | ICD-10-CM

## 2022-10-06 LAB
ALBUMIN SERPL BCP-MCNC: 2.8 G/DL (ref 3.5–5.2)
ALP SERPL-CCNC: 229 U/L (ref 55–135)
ALT SERPL W/O P-5'-P-CCNC: 90 U/L (ref 10–44)
ANION GAP SERPL CALC-SCNC: 12 MMOL/L (ref 8–16)
AST SERPL-CCNC: 55 U/L (ref 10–40)
BASOPHILS # BLD AUTO: 0.02 K/UL (ref 0–0.2)
BASOPHILS NFR BLD: 0.2 % (ref 0–1.9)
BILIRUB SERPL-MCNC: 0.5 MG/DL (ref 0.1–1)
BILIRUB UR QL STRIP: NEGATIVE
BUN SERPL-MCNC: 7 MG/DL (ref 8–23)
CALCIUM SERPL-MCNC: 9.1 MG/DL (ref 8.7–10.5)
CHLORIDE SERPL-SCNC: 87 MMOL/L (ref 95–110)
CLARITY UR: CLEAR
CO2 SERPL-SCNC: 24 MMOL/L (ref 23–29)
COLOR UR: YELLOW
CREAT SERPL-MCNC: 0.6 MG/DL (ref 0.5–1.4)
CTP QC/QA: YES
CTP QC/QA: YES
DIFFERENTIAL METHOD: ABNORMAL
EOSINOPHIL # BLD AUTO: 0 K/UL (ref 0–0.5)
EOSINOPHIL NFR BLD: 0 % (ref 0–8)
ERYTHROCYTE [DISTWIDTH] IN BLOOD BY AUTOMATED COUNT: 13.3 % (ref 11.5–14.5)
EST. GFR  (NO RACE VARIABLE): >60 ML/MIN/1.73 M^2
GLUCOSE SERPL-MCNC: 134 MG/DL (ref 70–110)
GLUCOSE UR QL STRIP: NEGATIVE
HCT VFR BLD AUTO: 35.7 % (ref 37–48.5)
HGB BLD-MCNC: 12.9 G/DL (ref 12–16)
HGB UR QL STRIP: ABNORMAL
IMM GRANULOCYTES # BLD AUTO: 0.06 K/UL (ref 0–0.04)
IMM GRANULOCYTES NFR BLD AUTO: 0.5 % (ref 0–0.5)
KETONES UR QL STRIP: ABNORMAL
LEUKOCYTE ESTERASE UR QL STRIP: NEGATIVE
LYMPHOCYTES # BLD AUTO: 0.6 K/UL (ref 1–4.8)
LYMPHOCYTES NFR BLD: 4.9 % (ref 18–48)
MCH RBC QN AUTO: 31 PG (ref 27–31)
MCHC RBC AUTO-ENTMCNC: 36.1 G/DL (ref 32–36)
MCV RBC AUTO: 86 FL (ref 82–98)
MICROSCOPIC COMMENT: ABNORMAL
MONOCYTES # BLD AUTO: 0.3 K/UL (ref 0.3–1)
MONOCYTES NFR BLD: 2.4 % (ref 4–15)
NEUTROPHILS # BLD AUTO: 10.9 K/UL (ref 1.8–7.7)
NEUTROPHILS NFR BLD: 92 % (ref 38–73)
NITRITE UR QL STRIP: NEGATIVE
NRBC BLD-RTO: 0 /100 WBC
PH UR STRIP: 7 [PH] (ref 5–8)
PLATELET # BLD AUTO: 266 K/UL (ref 150–450)
PMV BLD AUTO: 8.5 FL (ref 9.2–12.9)
POC MOLECULAR INFLUENZA A AGN: NEGATIVE
POC MOLECULAR INFLUENZA B AGN: NEGATIVE
POTASSIUM SERPL-SCNC: 3.7 MMOL/L (ref 3.5–5.1)
PROT SERPL-MCNC: 7.3 G/DL (ref 6–8.4)
PROT UR QL STRIP: NEGATIVE
RBC # BLD AUTO: 4.16 M/UL (ref 4–5.4)
RBC #/AREA URNS HPF: 8 /HPF (ref 0–4)
SARS-COV-2 RDRP RESP QL NAA+PROBE: NEGATIVE
SODIUM SERPL-SCNC: 123 MMOL/L (ref 136–145)
SP GR UR STRIP: 1.01 (ref 1–1.03)
URN SPEC COLLECT METH UR: ABNORMAL
UROBILINOGEN UR STRIP-ACNC: 1 EU/DL
WBC # BLD AUTO: 11.84 K/UL (ref 3.9–12.7)

## 2022-10-06 PROCEDURE — 81000 URINALYSIS NONAUTO W/SCOPE: CPT | Performed by: NURSE PRACTITIONER

## 2022-10-06 PROCEDURE — 71260 CT CHEST WITH CONTRAST: ICD-10-PCS | Mod: 26,,, | Performed by: RADIOLOGY

## 2022-10-06 PROCEDURE — 87635 SARS-COV-2 COVID-19 AMP PRB: CPT | Mod: PBBFAC | Performed by: FAMILY MEDICINE

## 2022-10-06 PROCEDURE — 71046 XR CHEST PA AND LATERAL: ICD-10-PCS | Mod: 26,,, | Performed by: RADIOLOGY

## 2022-10-06 PROCEDURE — 99213 OFFICE O/P EST LOW 20 MIN: CPT | Mod: PBBFAC,25 | Performed by: FAMILY MEDICINE

## 2022-10-06 PROCEDURE — 25500020 PHARM REV CODE 255: Performed by: FAMILY MEDICINE

## 2022-10-06 PROCEDURE — 99285 EMERGENCY DEPT VISIT HI MDM: CPT | Mod: 25,27

## 2022-10-06 PROCEDURE — 85025 COMPLETE CBC W/AUTO DIFF WBC: CPT | Performed by: NURSE PRACTITIONER

## 2022-10-06 PROCEDURE — 71046 X-RAY EXAM CHEST 2 VIEWS: CPT | Mod: 26,,, | Performed by: RADIOLOGY

## 2022-10-06 PROCEDURE — 96365 THER/PROPH/DIAG IV INF INIT: CPT | Mod: 59

## 2022-10-06 PROCEDURE — 96361 HYDRATE IV INFUSION ADD-ON: CPT

## 2022-10-06 PROCEDURE — 99214 PR OFFICE/OUTPT VISIT, EST, LEVL IV, 30-39 MIN: ICD-10-PCS | Mod: S$PBB,,, | Performed by: FAMILY MEDICINE

## 2022-10-06 PROCEDURE — 87502 INFLUENZA DNA AMP PROBE: CPT | Mod: PBBFAC | Performed by: FAMILY MEDICINE

## 2022-10-06 PROCEDURE — 71046 X-RAY EXAM CHEST 2 VIEWS: CPT | Mod: TC

## 2022-10-06 PROCEDURE — 25000003 PHARM REV CODE 250: Performed by: NURSE PRACTITIONER

## 2022-10-06 PROCEDURE — 99999 PR PBB SHADOW E&M-EST. PATIENT-LVL III: CPT | Mod: PBBFAC,,, | Performed by: FAMILY MEDICINE

## 2022-10-06 PROCEDURE — 71260 CT THORAX DX C+: CPT | Mod: 26,,, | Performed by: RADIOLOGY

## 2022-10-06 PROCEDURE — 80053 COMPREHEN METABOLIC PANEL: CPT | Performed by: NURSE PRACTITIONER

## 2022-10-06 PROCEDURE — 71260 CT THORAX DX C+: CPT | Mod: TC

## 2022-10-06 PROCEDURE — 99999 PR PBB SHADOW E&M-EST. PATIENT-LVL III: ICD-10-PCS | Mod: PBBFAC,,, | Performed by: FAMILY MEDICINE

## 2022-10-06 PROCEDURE — 63600175 PHARM REV CODE 636 W HCPCS: Performed by: NURSE PRACTITIONER

## 2022-10-06 PROCEDURE — 96375 TX/PRO/DX INJ NEW DRUG ADDON: CPT

## 2022-10-06 PROCEDURE — 99214 OFFICE O/P EST MOD 30 MIN: CPT | Mod: S$PBB,,, | Performed by: FAMILY MEDICINE

## 2022-10-06 RX ORDER — DOXYCYCLINE 100 MG/1
100 CAPSULE ORAL EVERY 12 HOURS
Qty: 20 CAPSULE | Refills: 0 | Status: SHIPPED | OUTPATIENT
Start: 2022-10-06 | End: 2022-10-11

## 2022-10-06 RX ORDER — LEVOFLOXACIN 500 MG/1
500 TABLET, FILM COATED ORAL DAILY
Qty: 5 TABLET | Refills: 0 | Status: SHIPPED | OUTPATIENT
Start: 2022-10-06 | End: 2022-10-11

## 2022-10-06 RX ORDER — ONDANSETRON 2 MG/ML
4 INJECTION INTRAMUSCULAR; INTRAVENOUS
Status: COMPLETED | OUTPATIENT
Start: 2022-10-06 | End: 2022-10-06

## 2022-10-06 RX ORDER — KETOROLAC TROMETHAMINE 30 MG/ML
15 INJECTION, SOLUTION INTRAMUSCULAR; INTRAVENOUS
Status: COMPLETED | OUTPATIENT
Start: 2022-10-06 | End: 2022-10-06

## 2022-10-06 RX ORDER — LEVOFLOXACIN 5 MG/ML
500 INJECTION, SOLUTION INTRAVENOUS
Status: COMPLETED | OUTPATIENT
Start: 2022-10-06 | End: 2022-10-06

## 2022-10-06 RX ORDER — DOXYCYCLINE 100 MG/1
100 CAPSULE ORAL EVERY 12 HOURS
Qty: 20 CAPSULE | Refills: 0 | Status: SHIPPED | OUTPATIENT
Start: 2022-10-06 | End: 2022-10-06 | Stop reason: SDUPTHER

## 2022-10-06 RX ORDER — ONDANSETRON 4 MG/1
4 TABLET, FILM COATED ORAL EVERY 6 HOURS PRN
Qty: 12 TABLET | Refills: 0 | Status: ON HOLD | OUTPATIENT
Start: 2022-10-06 | End: 2023-01-13

## 2022-10-06 RX ORDER — MORPHINE SULFATE 4 MG/ML
2 INJECTION, SOLUTION INTRAMUSCULAR; INTRAVENOUS
Status: COMPLETED | OUTPATIENT
Start: 2022-10-06 | End: 2022-10-06

## 2022-10-06 RX ADMIN — IOHEXOL 75 ML: 350 INJECTION, SOLUTION INTRAVENOUS at 03:10

## 2022-10-06 RX ADMIN — LEVOFLOXACIN 500 MG: 500 INJECTION, SOLUTION INTRAVENOUS at 03:10

## 2022-10-06 RX ADMIN — KETOROLAC TROMETHAMINE 15 MG: 30 INJECTION, SOLUTION INTRAMUSCULAR; INTRAVENOUS at 02:10

## 2022-10-06 RX ADMIN — ONDANSETRON HYDROCHLORIDE 4 MG: 2 SOLUTION INTRAMUSCULAR; INTRAVENOUS at 02:10

## 2022-10-06 RX ADMIN — SODIUM CHLORIDE 1000 ML: 0.9 INJECTION, SOLUTION INTRAVENOUS at 01:10

## 2022-10-06 RX ADMIN — MORPHINE SULFATE 2 MG: 4 INJECTION INTRAVENOUS at 04:10

## 2022-10-06 NOTE — ED PROVIDER NOTES
Encounter Date: 10/6/2022       History     Chief Complaint   Patient presents with    Fever     Pt. C/o fever, N/V, and headache beginning Saturday evening.      Patient complained of fatigue, cough, body ache, fever, could not keep fluid down, decreased appetite    The history is provided by the patient.   Fever  Primary symptoms of the febrile illness include fever, fatigue, headaches, cough and nausea. Primary symptoms do not include visual change. The current episode started 3 to 5 days ago. This is a new problem.   The maximum temperature recorded prior to her arrival was unknown.   The fatigue began 3 to 5 days ago. The fatigue has been unchanged since its onset.   The headache began more than 2 days ago. Headache is a recurrent problem. The pain from the headache is at a severity of 8/10. Location/region(s) of the headache: bilateral and frontal. The headache is associated with activity. The headache is associated with eye pain. The headache is not associated with aura, visual change or loss of balance.   The cough began 3 to 5 days ago. The cough is non-productive. There is nondescript sputum produced.   Nausea began 3 to 5 days ago. The nausea is associated with eating.   Review of patient's allergies indicates:   Allergen Reactions    Percocet [oxycodone-acetaminophen] Other (See Comments)     Headaches    Penicillins Rash     Past Medical History:   Diagnosis Date    Asthma     Hypertension     Hypotension, iatrogenic     Osteopenia     Respiratory distress     Thyroid disease      Past Surgical History:   Procedure Laterality Date    AUGMENTATION OF BREAST Bilateral     removal    BREAST RECONSTRUCTION      COLONOSCOPY N/A 3/17/2022    Procedure: COLONOSCOPY;  Surgeon: Alessandra Springer MD;  Location: [facility];  Service: General;  Laterality: N/A;    HIP REPLACEMENT ARTHROPLASTY Right      Family History   Problem Relation Age of Onset    Diabetes Mother     Stroke Mother     Heart attack Mother      Thyroid disease Mother     Heart attack Father     Breast cancer Neg Hx     Ovarian cancer Neg Hx      Social History     Tobacco Use    Smoking status: Never    Smokeless tobacco: Never   Substance Use Topics    Alcohol use: Yes     Comment: socially    Drug use: Not Currently     Review of Systems   Constitutional:  Positive for appetite change, fatigue, fever and unexpected weight change.   Eyes:  Positive for pain.   Respiratory:  Positive for cough.    Gastrointestinal:  Positive for nausea.   Neurological:  Positive for headaches. Negative for loss of balance.     Physical Exam     Initial Vitals [10/06/22 1325]   BP Pulse Resp Temp SpO2   134/89 104 19 99 °F (37.2 °C) 98 %      MAP       --         Physical Exam    Nursing note and vitals reviewed.  Constitutional: She appears well-developed and well-nourished. She appears ill. No distress.   HENT:   Head: Normocephalic and atraumatic.   Eyes: EOM are normal. Pupils are equal, round, and reactive to light.   Neck:   Normal range of motion.  Cardiovascular:  Normal rate and regular rhythm.           No murmur heard.  Pulmonary/Chest: No respiratory distress. She has decreased breath sounds. She has no wheezes. She has no rhonchi. She has no rales. She exhibits no tenderness.   Abdominal: Abdomen is soft.   Musculoskeletal:         General: Normal range of motion.      Cervical back: Normal range of motion.     Neurological: She is alert and oriented to person, place, and time. She has normal strength. GCS score is 15. GCS eye subscore is 4. GCS verbal subscore is 5. GCS motor subscore is 6.   Skin: Skin is warm and dry.   Psychiatric: She has a normal mood and affect.       ED Course   Procedures  Labs Reviewed   CBC W/ AUTO DIFFERENTIAL - Abnormal; Notable for the following components:       Result Value    Hematocrit 35.7 (*)     MCHC 36.1 (*)     MPV 8.5 (*)     Gran # (ANC) 10.9 (*)     Immature Grans (Abs) 0.06 (*)     Lymph # 0.6 (*)     Gran % 92.0 (*)      Lymph % 4.9 (*)     Mono % 2.4 (*)     All other components within normal limits   COMPREHENSIVE METABOLIC PANEL - Abnormal; Notable for the following components:    Sodium 123 (*)     Chloride 87 (*)     Glucose 134 (*)     BUN 7 (*)     Albumin 2.8 (*)     Alkaline Phosphatase 229 (*)     AST 55 (*)     ALT 90 (*)     All other components within normal limits   URINALYSIS, REFLEX TO URINE CULTURE - Abnormal; Notable for the following components:    Ketones, UA 1+ (*)     Occult Blood UA 1+ (*)     All other components within normal limits    Narrative:     Preferred Collection Type->Urine, Clean Catch  Specimen Source->Urine   URINALYSIS MICROSCOPIC - Abnormal; Notable for the following components:    RBC, UA 8 (*)     All other components within normal limits    Narrative:     Preferred Collection Type->Urine, Clean Catch  Specimen Source->Urine          Imaging Results              CT Chest With Contrast (Final result)  Result time 10/06/22 15:31:46      Final result by Mayela White MD (10/06/22 15:31:46)                   Impression:      Multi lobar pneumonia.    Borderline sized precarinal lymph node and enlarged right hilar lymph node are likely reactive, but follow-up is recommended.    Evidence of prior granulomatous infection.      Electronically signed by: Mayela White  Date:    10/06/2022  Time:    15:31               Narrative:    EXAMINATION:  CT CHEST WITH CONTRAST    CLINICAL HISTORY:  Cough, persistent;Respiratory illness, nondiagnostic xray;    TECHNIQUE:  Low dose axial images, sagittal and coronal reformations were obtained from the thoracic inlet to the lung bases following the IV administration of 75 mL of Omnipaque 350.    COMPARISON:  Chest x-ray today, calcium scoring CT 08/04/2022    FINDINGS:  There is a borderline sized precarinal lymph node with a short axis diameter of 10 mm series 2, image 41.  There is an enlarged right hilar lymph node with short axis diameter of 15  mm series 2, image 46.  There is a calcified left hilar lymph node.    The aorta is normal in caliber.  Calcific plaque noted in coronary arteries.  Small pericardial effusion unchanged.  Trace pleural effusions have developed.    There is  multi lobar consolidation consistent with pneumonia.  This involves the anterior right upper lobe, medial right lower lobe, left upper lobe.    There are calcified granulomata in the left lower lobe.  No noncalcified pulmonary nodules are identified.    The study was not performed with pulmonary embolus protocol, but the central pulmonary arteries are well opacified and normal in appearance with no filling defects.    No abnormalities are noted in the visualized portions of the upper abdomen.                                        X-Ray Chest PA And Lateral (Final result)  Result time 10/06/22 13:57:16      Final result by Mauricio Pride MD (10/06/22 13:57:16)                   Impression:      Findings suspicious for multi lobar pneumonia.  Correlate clinically with possible fever and/or elevated white count.  This should be followed until clear to exclude an underlying suspicious pulmonary lesion.    This report was flagged in Epic as abnormal.      Electronically signed by: Mauricio Pride  Date:    10/06/2022  Time:    13:57               Narrative:    EXAMINATION:  XR CHEST PA AND LATERAL    CLINICAL HISTORY:  Cough, unspecified    TECHNIQUE:  PA and lateral views of the chest were performed.    COMPARISON:  None    FINDINGS:  There is mild pulmonary hypoinflation.  There is a masslike infiltrate of the anterior right perihilar lung.  Scattered patchy infiltrates of the left upper lobe.  No significant pleural effusion.    Heart size normal.  Minimal calcified aortic plaque.  Trachea midline.    Bony thorax intact.                                       Medications   sodium chloride 0.9% bolus 1,000 mL (0 mLs Intravenous Stopped 10/6/22 4967)   ondansetron injection 4 mg  (4 mg Intravenous Given 10/6/22 1400)   ketorolac injection 15 mg (15 mg Intravenous Given 10/6/22 1400)   iohexoL (OMNIPAQUE 350) injection 75 mL (75 mLs Intravenous Given 10/6/22 1501)   levoFLOXacin 500 mg/100 mL IVPB 500 mg (0 mg Intravenous Stopped 10/6/22 1645)   morphine injection 2 mg (2 mg Intravenous Given 10/6/22 1623)     Medical Decision Making:   Differential Diagnosis:   Cancer, pneumonia, dehydration  Clinical Tests:   Lab Tests: Ordered and Reviewed  The following lab test(s) were unremarkable: CBC, CMP and Urinalysis  Radiological Study: Ordered and Reviewed  Medical Tests: Ordered and Reviewed  ED Management:  Treated pneumonia  Dr. Daniel examined patient in person, discussed about findings, follow up  Patient was advised to follow up with unexpected weight change, microscopic hematuria  Please return for new, changing, or worsening pain. She expressed understanding and agreed with treatment plan and was discharged in stable condition.                             Clinical Impression:   Final diagnoses:  [R05.9] Cough  [J18.9] Pneumonia due to infectious organism, unspecified laterality, unspecified part of lung (Primary)  [R31.29] Microscopic hematuria  [G44.209] Tension-type headache, not intractable, unspecified chronicity pattern        ED Disposition Condition    Discharge Stable          ED Prescriptions       Medication Sig Dispense Start Date End Date Auth. Provider    levoFLOXacin (LEVAQUIN) 500 MG tablet Take 1 tablet (500 mg total) by mouth once daily. for 5 days 5 tablet 10/6/2022 10/11/2022 Juan Evans NP    ondansetron (ZOFRAN) 4 MG tablet Take 1 tablet (4 mg total) by mouth every 6 (six) hours as needed for Nausea (vomiting). 12 tablet 10/6/2022 -- Juan Evans NP          Follow-up Information       Follow up With Specialties Details Why Contact Info    Shanice Yang MD Family Medicine   [address] [place] [address] [place] [address] [phone]      [place] [facility]  Dept Emergency Medicine  If symptoms worsen [address] [facility] [address]  [phone]             Juan Evans NP  10/08/22 0034

## 2022-10-06 NOTE — DISCHARGE INSTRUCTIONS
Take the medications as prescribed. Return for any worsening or new symptoms. Please Follow up with Primary Care Provider in the next 2-3 days.      SYNCOPE

## 2022-10-06 NOTE — PROGRESS NOTES
[facility] - Clinic Note    Subjective      Ms. Jackson is a 67 y.o. female who presents to clinic for fevers.     Patient reports that for the past 6 days she has been having a fever.   Tmax was 103F  Associated with nausea, chills, and headache.   She has been taking tylenol, ibuprofen, and nasocort.   Last fever was last night and was 100.1F  No know sick contacts.   She has done 2 home covid-19 tests which were negative    PMH Edith has a past medical history of Asthma, Hypertension, Hypotension, iatrogenic, Osteopenia, Respiratory distress, and Thyroid disease.   PSXH Edith has a past surgical history that includes Hip replacement arthroplasty (Right); Breast reconstruction; Augmentation of breast (Bilateral); and Colonoscopy (N/A, 3/17/2022).    Edith's family history includes Diabetes in her mother; Heart attack in her father and mother; Stroke in her mother; Thyroid disease in her mother.   SH Edith reports that she has never smoked. She has never used smokeless tobacco. She reports current alcohol use. She reports that she does not currently use drugs.   ALG Edith is allergic to percocet [oxycodone-acetaminophen] and penicillins.   MED Edith has a current medication list which includes the following prescription(s): amlodipine, estradiol, fluticasone furoate-vilanterol, levothyroxine, metoprolol succinate, rosuvastatin, trazodone, and doxycycline.     Review of Systems   Constitutional:  Positive for chills, fatigue and fever. Negative for activity change and appetite change.   HENT:  Positive for congestion, postnasal drip and sinus pressure. Negative for ear discharge, ear pain, nosebleeds, rhinorrhea, sinus pain and sore throat.    Eyes:  Negative for visual disturbance.   Respiratory:  Positive for cough and shortness of breath. Negative for wheezing.    Cardiovascular:  Negative for chest pain, palpitations and leg swelling.   Gastrointestinal:  Positive for nausea. Negative  "for abdominal pain and vomiting.   Musculoskeletal:  Negative for myalgias.   Skin:  Negative for wound.   Neurological:  Negative for dizziness and headaches.   Psychiatric/Behavioral:  Negative for confusion.    Objective     /73 (BP Location: Left arm, Patient Position: Sitting, BP Method: Medium (Manual))   Pulse 78   Temp 97.8 °F (36.6 °C) (Oral)   Resp 18   Ht 5' 5" (1.651 m)   Wt 55.3 kg (122 lb)   LMP  (LMP Unknown)   SpO2 99%   BMI 20.30 kg/m²     Physical Exam   Constitutional: normal appearance. She appears well-developed and well-nourished.  Non-toxic appearance. No distress. She appears ill.   HENT:   Head: Normocephalic and atraumatic.   Right Ear: Tympanic membrane, external ear and ear canal normal.   Left Ear: Tympanic membrane, external ear and ear canal normal.   Mouth/Throat: No oropharyngeal exudate or posterior oropharyngeal erythema. Oropharynx is clear.   Dry nasal membranes  +left sided maxillary tenderness to palpation   Eyes: Conjunctivae are normal. Right eye exhibits no discharge. Left eye exhibits no discharge. No scleral icterus.   Cardiovascular: Normal rate, regular rhythm, normal heart sounds and normal pulses. Exam reveals no gallop and no friction rub.   No murmur heard.Pulmonary:      Effort: Pulmonary effort is normal. No respiratory distress.      Breath sounds: Normal breath sounds. No wheezing, rhonchi or rales.     Abdominal: Normal appearance.   Musculoskeletal:      Cervical back: Neck supple.   Lymphadenopathy:     She has no cervical adenopathy.   Neurological: She is alert.   Skin: Skin is warm and dry. Capillary refill takes less than 2 seconds. She is not diaphoretic.   Psychiatric: Her behavior is normal. Mood, judgment and thought content normal.   Vitals reviewed.   Assessment/Plan     Edith was seen today for fever and chills.    Diagnoses and all orders for this visit:    Acute non-recurrent maxillary sinusitis  -     doxycycline (VIBRAMYCIN) 100 " MG Cap; Take 1 capsule (100 mg total) by mouth every 12 (twelve) hours. for 10 days    Acute febrile illness  -     doxycycline (VIBRAMYCIN) 100 MG Cap; Take 1 capsule (100 mg total) by mouth every 12 (twelve) hours. for 10 days  -     POCT COVID-19 Rapid Screening  -     POCT Influenza A/B Molecular    -rapid flu and covid was negative. Will treat for sinus infection    Follow up if symptoms worsen or fail to improve.    Future Appointments   Date Time Provider Department Center   10/11/2022 12:00 PM Abhinav Lee, PT [facility]   10/13/2022 10:00 AM Abhinav Lee, PT [facility]   10/18/2022 10:30 AM Phylicia Smith, PTA [facility]   10/20/2022 10:00 AM Abhinav Lee, PT [facility]   10/25/2022 10:30 AM Phylicia Smith, PTA [facility]   10/27/2022 10:00 AM Abhinav Lee, PT [facility]   11/1/2022 10:30 AM Phylicia Smith, PTA [facility]   11/3/2022 10:00 AM Abhinav Lee, PT [facility]   11/8/2022 10:30 AM Phylicia Smith, PTA [facility]   11/10/2022 10:00 AM Abhinav Lee, PT [facility]   2/6/2023 10:20 AM Shanice Yang MD [facility]   7/31/2023 10:00 AM Lane Omalley MD [facility] C[facility]       Shanice Yang MD  Family Medicine  [facility]

## 2022-10-11 ENCOUNTER — OFFICE VISIT (OUTPATIENT)
Dept: FAMILY MEDICINE | Facility: CLINIC | Age: 67
End: 2022-10-11
Payer: MEDICARE

## 2022-10-11 ENCOUNTER — HOSPITAL ENCOUNTER (OUTPATIENT)
Dept: RADIOLOGY | Facility: HOSPITAL | Age: 67
Discharge: HOME | End: 2022-10-11
Attending: FAMILY MEDICINE
Payer: MEDICARE

## 2022-10-11 ENCOUNTER — PATIENT MESSAGE (OUTPATIENT)
Dept: FAMILY MEDICINE | Facility: CLINIC | Age: 67
End: 2022-10-11
Payer: MEDICARE

## 2022-10-11 VITALS
RESPIRATION RATE: 17 BRPM | OXYGEN SATURATION: 99 % | HEART RATE: 73 BPM | BODY MASS INDEX: 20.57 KG/M2 | HEIGHT: 64 IN | SYSTOLIC BLOOD PRESSURE: 138 MMHG | DIASTOLIC BLOOD PRESSURE: 84 MMHG | WEIGHT: 120.5 LBS

## 2022-10-11 DIAGNOSIS — J18.1 LOBAR PNEUMONIA: Primary | ICD-10-CM

## 2022-10-11 DIAGNOSIS — R59.0 LOCALIZED ENLARGED LYMPH NODES: ICD-10-CM

## 2022-10-11 PROCEDURE — 99214 OFFICE O/P EST MOD 30 MIN: CPT | Mod: S$PBB,,, | Performed by: FAMILY MEDICINE

## 2022-10-11 PROCEDURE — 99999 PR PBB SHADOW E&M-EST. PATIENT-LVL III: ICD-10-PCS | Mod: PBBFAC,,, | Performed by: FAMILY MEDICINE

## 2022-10-11 PROCEDURE — 71046 X-RAY EXAM CHEST 2 VIEWS: CPT | Mod: 26,,, | Performed by: RADIOLOGY

## 2022-10-11 PROCEDURE — 99999 PR PBB SHADOW E&M-EST. PATIENT-LVL III: CPT | Mod: PBBFAC,,, | Performed by: FAMILY MEDICINE

## 2022-10-11 PROCEDURE — 71046 X-RAY EXAM CHEST 2 VIEWS: CPT | Mod: TC

## 2022-10-11 PROCEDURE — 99213 OFFICE O/P EST LOW 20 MIN: CPT | Mod: PBBFAC | Performed by: FAMILY MEDICINE

## 2022-10-11 PROCEDURE — 99214 PR OFFICE/OUTPT VISIT, EST, LEVL IV, 30-39 MIN: ICD-10-PCS | Mod: S$PBB,,, | Performed by: FAMILY MEDICINE

## 2022-10-11 PROCEDURE — 71046 XR CHEST PA AND LATERAL: ICD-10-PCS | Mod: 26,,, | Performed by: RADIOLOGY

## 2022-10-11 NOTE — PROGRESS NOTES
[facility] - Clinic Note    Subjective      Ms. Jackson is a 67 y.o. female who presents to clinic for an ED follow up.     Patient was seen by me 5 days ago and given doxy for a sinus infection.   She went to the ED later that night has she was unable to keep down the abx.   CT chest in the ED revealed:   There is  multi lobar consolidation consistent with pneumonia.  This involves the anterior right upper lobe, medial right lower lobe, left upper lobe.  There are calcified granulomata in the left lower lobe.  No noncalcified pulmonary nodules are identified.  borderline sized precarinal lymph node with a short axis diameter of 10 mm series 2, image 41.  There is an enlarged right hilar lymph node with short axis diameter of 15 mm series 2, image 46.  There is a calcified left hilar lymph node.  Discharged with levaquin x 5 days.   Took her last abx today.   Feeling better. No fevers.     [facility] Edith has a past medical history of Asthma, Hypertension, Hypotension, iatrogenic, Osteopenia, Respiratory distress, and Thyroid disease.   PSX Edith has a past surgical history that includes Hip replacement arthroplasty (Right); Breast reconstruction; Augmentation of breast (Bilateral); and Colonoscopy (N/A, 3/17/2022).    Edith's family history includes Diabetes in her mother; Heart attack in her father and mother; Stroke in her mother; Thyroid disease in her mother.    Edith reports that she has never smoked. She has never used smokeless tobacco. She reports current alcohol use. She reports that she does not currently use drugs.   BRYANNA Sharma is allergic to percocet [oxycodone-acetaminophen] and penicillins.   DAVID Sharma has a current medication list which includes the following prescription(s): amlodipine, estradiol, fluticasone furoate-vilanterol, levofloxacin, levothyroxine, metoprolol succinate, ondansetron, rosuvastatin, and trazodone.     Review of Systems   Constitutional:  Negative for  "activity change, appetite change, chills, fatigue and fever.   Eyes:  Negative for visual disturbance.   Respiratory:  Negative for cough and shortness of breath.    Cardiovascular:  Negative for chest pain, palpitations and leg swelling.   Gastrointestinal:  Negative for abdominal pain, nausea and vomiting.   Skin:  Negative for wound.   Neurological:  Negative for dizziness and headaches.   Psychiatric/Behavioral:  Negative for confusion.    Objective     /84 (BP Location: Left arm, Patient Position: Sitting, BP Method: Medium (Manual))   Pulse 73   Resp 17   Ht 5' 4" (1.626 m)   Wt 54.7 kg (120 lb 8 oz)   LMP  (LMP Unknown)   SpO2 99%   BMI 20.68 kg/m²     Physical Exam   Constitutional: normal appearance. She appears well-developed and well-nourished.  Non-toxic appearance. No distress. She does not appear ill.   HENT:   Head: Normocephalic and atraumatic.   Eyes: Right eye exhibits no discharge. Left eye exhibits no discharge.   Cardiovascular: Normal rate, regular rhythm, normal heart sounds and normal pulses. Exam reveals no gallop and no friction rub.   No murmur heard.Pulmonary:      Effort: Pulmonary effort is normal. No respiratory distress.      Breath sounds: Normal breath sounds. No wheezing, rhonchi or rales.     Abdominal: Normal appearance.   Musculoskeletal:      Cervical back: Neck supple.   Lymphadenopathy:     She has no cervical adenopathy.   Neurological: She is alert.   Skin: Skin is warm and dry. Capillary refill takes less than 2 seconds. She is not diaphoretic.   Psychiatric: Her behavior is normal. Mood, judgment and thought content normal.   Vitals reviewed.   Assessment/Plan     Edith was seen today for er follow up .    Diagnoses and all orders for this visit:    Lobar pneumonia  -     X-Ray Chest PA And Lateral; Future    Localized enlarged lymph nodes  -     CT Chest Without Contrast; Future  - CT recommended follow up. Likely 2/2 PNA but will obtain CT chest in month " for follow up.       Future Appointments   Date Time Provider Department Center   10/11/2022  1:[facility]   10/25/2022  2:40 PM Nestor Gtz MD [facility] PULM [facility]   2/6/2023 10:20 AM Shanice Yang MD [facility] FAMMED [facility]   7/31/2023 10:00 AM Lane Omalley MD [facility] CARDIO1 [facility]       Shanice Yang MD  Family Medicine  [facility]

## 2022-10-25 ENCOUNTER — OFFICE VISIT (OUTPATIENT)
Dept: PULMONOLOGY | Facility: CLINIC | Age: 67
End: 2022-10-25
Payer: MEDICARE

## 2022-10-25 VITALS
HEART RATE: 67 BPM | BODY MASS INDEX: 20.79 KG/M2 | DIASTOLIC BLOOD PRESSURE: 79 MMHG | WEIGHT: 121.81 LBS | OXYGEN SATURATION: 100 % | HEIGHT: 64 IN | SYSTOLIC BLOOD PRESSURE: 124 MMHG

## 2022-10-25 DIAGNOSIS — R07.89 OTHER CHEST PAIN: ICD-10-CM

## 2022-10-25 DIAGNOSIS — J18.9 PNEUMONIA OF BOTH LUNGS DUE TO INFECTIOUS ORGANISM, UNSPECIFIED PART OF LUNG: Primary | ICD-10-CM

## 2022-10-25 DIAGNOSIS — R93.1 AGATSTON CORONARY ARTERY CALCIUM SCORE GREATER THAN 400: ICD-10-CM

## 2022-10-25 DIAGNOSIS — J45.20 MILD INTERMITTENT ASTHMA WITHOUT COMPLICATION: ICD-10-CM

## 2022-10-25 DIAGNOSIS — M54.2 NECK PAIN: ICD-10-CM

## 2022-10-25 DIAGNOSIS — R09.89 CHRONIC SINUS COMPLAINTS: ICD-10-CM

## 2022-10-25 PROCEDURE — 99214 OFFICE O/P EST MOD 30 MIN: CPT | Mod: PBBFAC,PO | Performed by: INTERNAL MEDICINE

## 2022-10-25 PROCEDURE — 99999 PR PBB SHADOW E&M-EST. PATIENT-LVL IV: ICD-10-PCS | Mod: PBBFAC,,, | Performed by: INTERNAL MEDICINE

## 2022-10-25 PROCEDURE — 99205 OFFICE O/P NEW HI 60 MIN: CPT | Mod: S$PBB,,, | Performed by: INTERNAL MEDICINE

## 2022-10-25 PROCEDURE — 99205 PR OFFICE/OUTPT VISIT, NEW, LEVL V, 60-74 MIN: ICD-10-PCS | Mod: S$PBB,,, | Performed by: INTERNAL MEDICINE

## 2022-10-25 PROCEDURE — 99999 PR PBB SHADOW E&M-EST. PATIENT-LVL IV: CPT | Mod: PBBFAC,,, | Performed by: INTERNAL MEDICINE

## 2022-10-25 RX ORDER — CELECOXIB 200 MG/1
200 CAPSULE ORAL DAILY PRN
Qty: 30 CAPSULE | Refills: 5 | Status: SHIPPED | OUTPATIENT
Start: 2022-10-25 | End: 2022-11-09

## 2022-10-25 RX ORDER — PREDNISONE 20 MG/1
TABLET ORAL
Qty: 15 TABLET | Refills: 1 | Status: SHIPPED | OUTPATIENT
Start: 2022-10-25 | End: 2022-11-09

## 2022-10-25 RX ORDER — FLUTICASONE PROPIONATE AND SALMETEROL XINAFOATE 230; 21 UG/1; UG/1
2 AEROSOL, METERED RESPIRATORY (INHALATION) 2 TIMES DAILY
Qty: 12 G | Refills: 11 | Status: SHIPPED | OUTPATIENT
Start: 2022-10-25 | End: 2023-10-25

## 2022-10-25 RX ORDER — CYCLOBENZAPRINE HCL 10 MG
10 TABLET ORAL 3 TIMES DAILY PRN
Qty: 30 TABLET | Refills: 1 | Status: SHIPPED | OUTPATIENT
Start: 2022-10-25 | End: 2022-11-04

## 2022-10-25 RX ORDER — ALBUTEROL SULFATE 90 UG/1
AEROSOL, METERED RESPIRATORY (INHALATION)
Qty: 18 G | Refills: 11 | Status: SHIPPED | OUTPATIENT
Start: 2022-10-25

## 2022-10-25 RX ORDER — HYDROCODONE BITARTRATE AND ACETAMINOPHEN 5; 325 MG/1; MG/1
1 TABLET ORAL EVERY 6 HOURS PRN
Qty: 28 TABLET | Refills: 0 | Status: ON HOLD | OUTPATIENT
Start: 2022-10-25 | End: 2023-01-13

## 2022-10-25 NOTE — PROGRESS NOTES
10/25/2022    Edith Jackson  New Patient Consult    Chief Complaint   Patient presents with    Follow-up     Had pneumonia two weeks ago. Suggested to me get a CAT scan.       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  Had pneumonia 2 wks ago-- had cough with fever 104, went to er 10/6/2022    Had asthma dx -- seasonal with October and spring problems.  Breo helps.    Pt had freq sinsus infections and had 2 surgeries-- none for 11 yrs. Had used freq abx in past -- none for 12 yrs til recent pneumonia.    Pt chr slender max wt 130 or so.  Lost weight but very active still     Chr poor sleep -- denies depression    Pt had had some vague chest pains with high reta score and family h/o coronary dz.      h       PFSH:  Past Medical History:   Diagnosis Date    Asthma     Hypertension     Hypotension, iatrogenic     Osteopenia     Respiratory distress     Thyroid disease          Past Surgical History:   Procedure Laterality Date    AUGMENTATION OF BREAST Bilateral     removal    BREAST RECONSTRUCTION      COLONOSCOPY N/A 3/17/2022    Procedure: COLONOSCOPY;  Surgeon: Alessandra Springer MD;  Location: [facility];  Service: General;  Laterality: N/A;    HIP REPLACEMENT ARTHROPLASTY Right      Social History     Tobacco Use    Smoking status: Never    Smokeless tobacco: Never   Substance Use Topics    Alcohol use: Yes     Comment: socially    Drug use: Not Currently     Family History   Problem Relation Age of Onset    Diabetes Mother     Stroke Mother     Heart attack Mother     Thyroid disease Mother     Heart attack Father     Breast cancer Neg Hx     Ovarian cancer Neg Hx      Review of patient's allergies indicates:   Allergen Reactions    Percocet [oxycodone-acetaminophen] Other (See Comments)     Headaches    Penicillins Rash          Review of Systems:  a review of eleven systems covering constitutional, Eye, HEENT, Psych, Respiratory, Cardiac, GI, , Musculoskeletal, Endocrine, Dermatologic was negative  "except for pertinent findings as listed ABOVE and below:  pertinent positive as above, rest is good       Exam:Comprehensive exam done. /79 (BP Location: Left arm, Patient Position: Sitting, BP Method: Medium (Automatic))   Pulse 67   Ht 5' 4" (1.626 m)   Wt 55.3 kg (121 lb 12.9 oz)   LMP  (LMP Unknown)   SpO2 100%   BMI 20.91 kg/m²   Exam included Vitals as listed, and patient's appearance and affect and alertness and mood, oral exam for yeast and hygiene and pharynx lesions and Mallapatti (M) score, neck with inspection for jvd and masses and thyroid abnormalities and lymph nodes (supraclavicular and infraclavicular nodes and axillary also examined and noted if abn), chest exam included symmetry and effort and fremitus and percussion and auscultation, cardiac exam included rhythm and gallops and murmur and rubs and jvd and edema, abdominal exam for mass and hepatosplenomegaly and tenderness and hernias and bowel sounds, Musculoskeletal exam with muscle tone and posture and mobility/gait and  strength, and skin for rashes and cyanosis and pallor and turgor, extremity for clubbing.  Findings were normal except for pertinent findings listed below:  M1, slender, no pectus,chest is symmetric, no distress, normal percussion, normal fremitus and good normal breath sounds , no clubbing         Radiographs (ct chest and cxr) reviewed: view by direct vision  ct chest 10/6/2022 I dont see sign mediastinal adenopathy,  coronaries reta, multilobar pneumonia improved by cxr on 10/11/2022     Labs reviewed           PFT will be done and results to be reviewed       Plan:  Clinical impression is apparently straight forward and impression with management as below.    Edith was seen today for follow-up.    Diagnoses and all orders for this visit:    Pneumonia of both lungs due to infectious organism, unspecified part of lung  -     IgG; Future    Mild intermittent asthma without complication  -     " fluticasone-salmeterol 230-21 mcg/dose (ADVAIR HFA) 230-21 mcg/actuation HFAA inhaler; Inhale 2 puffs into the lungs 2 (two) times daily. Controller  -     Humoral Immune Eval (Pneumo Serotypes) With H. Flu; Future  -     predniSONE (DELTASONE) 20 MG tablet; Take one daily for 3 days and repeat for asthma.  -     albuterol (PROVENTIL/VENTOLIN HFA) 90 mcg/actuation inhaler; 2 puffs every 4 hours as needed for cough, wheeze, or shortness of breath    Neck pain  -     X-Ray Cervical Spine 2 or 3 Views; Future  -     cyclobenzaprine (FLEXERIL) 10 MG tablet; Take 1 tablet (10 mg total) by mouth 3 (three) times daily as needed for Muscle spasms.  -     celecoxib (CELEBREX) 200 MG capsule; Take 1 capsule (200 mg total) by mouth daily as needed for Pain.  -     Ambulatory referral/consult to Pain Clinic; Future  -     HYDROcodone-acetaminophen (NORCO) 5-325 mg per tablet; Take 1 tablet by mouth every 6 (six) hours as needed for Pain.    Other chest pain  -     NM Myocardial Perfusion Spect Multi Exer; Future    Chronic sinus complaints  -     Humoral Immune Eval (Pneumo Serotypes) With H. Flu; Future    Agatston coronary artery calcium score greater than 400, 445 in 2022      Follow up in about 6 months (around 4/25/2023), or if symptoms worsen or fail to improve.    Discussed with patient above for education the following:      Patient Instructions   Nasal polyps -- may suppress with flonase up to 2 sprays up to twice daily.    Asthma   Controller seasonally needed-- generic advair puffer best as you need prn somewhat.  2 puffs twice daily -- should use full dose during active season   Rescue - albuterol as needed-- up to 2 + puffs every 4 hrs as needed   Action plan-- sometimes includes antibiotics?  Generally prednisone 20 mg daily for 3 days and repeat as needed (if albuterol not working nor lasting - or severe asthma symptoms)        Neck problems-- check xray and trial muscle relaxant (flexeril as needed) and celebrex  (like ibuprofen but will not worsen asthma).  Will send to pain management.  Could use narco if bad pain.     With bad ca score and vague chest pain -- will order nuclear stress test-- may need cardiology follow up .     Will do immune screen.    Cxr clearing after pneumonia-- no big concerns for that pneumonia.    Evaluation took 61 min

## 2022-10-25 NOTE — PATIENT INSTRUCTIONS
Nasal polyps -- may suppress with flonase up to 2 sprays up to twice daily.    Asthma   Controller seasonally needed-- generic advair puffer best as you need prn somewhat.  2 puffs twice daily -- should use full dose during active season   Rescue - albuterol as needed-- up to 2 + puffs every 4 hrs as needed   Action plan-- sometimes includes antibiotics?  Generally prednisone 20 mg daily for 3 days and repeat as needed (if albuterol not working nor lasting - or severe asthma symptoms)        Neck problems-- check xray and trial muscle relaxant (flexeril as needed) and celebrex (like ibuprofen but will not worsen asthma).  Will send to pain management.  Could use narco if bad pain.     With bad ca score and vague chest pain -- will order nuclear stress test-- may need cardiology follow up .     Will do immune screen.    Cxr clearing after pneumonia-- no big concerns for that pneumonia.

## 2022-10-26 ENCOUNTER — TELEPHONE (OUTPATIENT)
Dept: FAMILY MEDICINE | Facility: CLINIC | Age: 67
End: 2022-10-26
Payer: MEDICARE

## 2022-10-27 ENCOUNTER — PATIENT MESSAGE (OUTPATIENT)
Dept: PULMONOLOGY | Facility: CLINIC | Age: 67
End: 2022-10-27
Payer: MEDICARE

## 2022-10-27 ENCOUNTER — HOSPITAL ENCOUNTER (OUTPATIENT)
Dept: RADIOLOGY | Facility: HOSPITAL | Age: 67
Discharge: HOME | End: 2022-10-27
Attending: INTERNAL MEDICINE
Payer: MEDICARE

## 2022-10-27 DIAGNOSIS — M54.2 NECK PAIN: ICD-10-CM

## 2022-10-31 ENCOUNTER — HOSPITAL ENCOUNTER (OUTPATIENT)
Dept: RADIOLOGY | Facility: HOSPITAL | Age: 67
Discharge: HOME | End: 2022-10-31
Attending: INTERNAL MEDICINE
Payer: MEDICARE

## 2022-10-31 PROCEDURE — 72040 X-RAY EXAM NECK SPINE 2-3 VW: CPT | Mod: TC

## 2022-10-31 PROCEDURE — 72040 X-RAY EXAM NECK SPINE 2-3 VW: CPT | Mod: 26,,, | Performed by: RADIOLOGY

## 2022-10-31 PROCEDURE — 72040 XR CERVICAL SPINE 2 OR 3 VIEWS: ICD-10-PCS | Mod: 26,,, | Performed by: RADIOLOGY

## 2022-11-01 ENCOUNTER — TELEPHONE (OUTPATIENT)
Dept: CARDIOLOGY | Facility: HOSPITAL | Age: 67
End: 2022-11-01

## 2022-11-01 NOTE — TELEPHONE ENCOUNTER
Patient advised, [facility]   Will need to register on the first floor at the main entrance.   Patient advised that arrival time is 7:30am.  Patient advised that she may be here about 3.5-4 hours, and may want to bring something to occupy their time, as there will be periods of waiting.    Patient advised, may take her medications prior to testing if you need to.  Patient should HOLD Metoprolol.   Advised if she needs to eat to take her medications, please keep it light, like toast and juice.    Patient advised to avoid all caffeine 12 hours prior to testing.  This includes decaf tea and coffee.    Will provide peanut butter crackers for a snack after stress test.  If patient would prefer something else, please bring a snack from home.    Wear comfortable clothing.   No lotions, oils, or powders to the upper chest area. May wear deodorant.    No metal jewelry, buttons, or zippers to the upper body.  Patient verbalizes understanding of instructions.

## 2022-11-02 ENCOUNTER — HOSPITAL ENCOUNTER (OUTPATIENT)
Dept: RADIOLOGY | Facility: HOSPITAL | Age: 67
Discharge: HOME | End: 2022-11-02
Attending: INTERNAL MEDICINE
Payer: MEDICARE

## 2022-11-02 ENCOUNTER — HOSPITAL ENCOUNTER (OUTPATIENT)
Dept: CARDIOLOGY | Facility: HOSPITAL | Age: 67
Discharge: HOME | End: 2022-11-02
Attending: INTERNAL MEDICINE
Payer: MEDICARE

## 2022-11-02 DIAGNOSIS — R07.89 OTHER CHEST PAIN: ICD-10-CM

## 2022-11-02 DIAGNOSIS — R93.1 AGATSTON CORONARY ARTERY CALCIUM SCORE GREATER THAN 400: ICD-10-CM

## 2022-11-02 LAB
CV STRESS BASE HR: 72 BPM
DIASTOLIC BLOOD PRESSURE: 88 MMHG
EJECTION FRACTION- HIGH: 59 %
END DIASTOLIC INDEX-HIGH: 155 ML/M2
END DIASTOLIC INDEX-LOW: 91 ML/M2
END SYSTOLIC INDEX-HIGH: 78 ML/M2
END SYSTOLIC INDEX-LOW: 40 ML/M2
NUC STRESS DIASTOLIC VOLUME INDEX: 71
NUC STRESS EJECTION FRACTION: 69 %
NUC STRESS SYSTOLIC VOLUME INDEX: 22
OHS CV CPX 1 MINUTE RECOVERY HEART RATE: 128 BPM
OHS CV CPX 85 PERCENT MAX PREDICTED HEART RATE MALE: 126
OHS CV CPX ESTIMATED METS: 10
OHS CV CPX MAX PREDICTED HEART RATE: 148
OHS CV CPX PATIENT IS FEMALE: 1
OHS CV CPX PATIENT IS MALE: 0
OHS CV CPX PEAK DIASTOLIC BLOOD PRESSURE: 88 MMHG
OHS CV CPX PEAK HEAR RATE: 140 BPM
OHS CV CPX PEAK RATE PRESSURE PRODUCT: NORMAL
OHS CV CPX PEAK SYSTOLIC BLOOD PRESSURE: 164 MMHG
OHS CV CPX PERCENT MAX PREDICTED HEART RATE ACHIEVED: 95
OHS CV CPX RATE PRESSURE PRODUCT PRESENTING: 9936
RETIRED EF AND QEF - SEE NOTES: 47 %
STRESS ECHO POST EXERCISE DUR MIN: 7 MINUTES
STRESS ECHO POST EXERCISE DUR SEC: 40 SECONDS
SYSTOLIC BLOOD PRESSURE: 138 MMHG

## 2022-11-02 PROCEDURE — 93018 CV STRESS TEST I&R ONLY: CPT | Mod: ,,, | Performed by: INTERNAL MEDICINE

## 2022-11-02 PROCEDURE — 93018 NUCLEAR STRESS - CARDIOLOGY INTERPRETED (CUPID ONLY): ICD-10-PCS | Mod: ,,, | Performed by: INTERNAL MEDICINE

## 2022-11-02 PROCEDURE — 78452 HT MUSCLE IMAGE SPECT MULT: CPT | Mod: 26,,, | Performed by: INTERNAL MEDICINE

## 2022-11-02 PROCEDURE — 78452 NUCLEAR STRESS - CARDIOLOGY INTERPRETED (CUPID ONLY): ICD-10-PCS | Mod: 26,,, | Performed by: INTERNAL MEDICINE

## 2022-11-02 PROCEDURE — 93016 NUCLEAR STRESS - CARDIOLOGY INTERPRETED (CUPID ONLY): ICD-10-PCS | Mod: ,,, | Performed by: NURSE PRACTITIONER

## 2022-11-02 PROCEDURE — 93016 CV STRESS TEST SUPVJ ONLY: CPT | Mod: ,,, | Performed by: NURSE PRACTITIONER

## 2022-11-02 PROCEDURE — A9502 TC99M TETROFOSMIN: HCPCS

## 2022-11-03 DIAGNOSIS — R50.9 ACUTE FEBRILE ILLNESS: ICD-10-CM

## 2022-11-03 DIAGNOSIS — J01.00 ACUTE NON-RECURRENT MAXILLARY SINUSITIS: ICD-10-CM

## 2022-11-03 DIAGNOSIS — D84.9 IMMUNE DEFICIENCY DISORDER: Primary | ICD-10-CM

## 2022-11-03 RX ORDER — DOXYCYCLINE 100 MG/1
100 CAPSULE ORAL EVERY 12 HOURS
Qty: 20 CAPSULE | Refills: 5 | Status: SHIPPED | OUTPATIENT
Start: 2022-11-03 | End: 2022-11-09

## 2022-11-04 ENCOUNTER — PATIENT MESSAGE (OUTPATIENT)
Dept: PULMONOLOGY | Facility: CLINIC | Age: 67
End: 2022-11-04
Payer: MEDICARE

## 2022-11-07 ENCOUNTER — TELEPHONE (OUTPATIENT)
Dept: PULMONOLOGY | Facility: CLINIC | Age: 67
End: 2022-11-07
Payer: MEDICARE

## 2022-11-07 NOTE — TELEPHONE ENCOUNTER
Sent portal message with results   ----- Message from Nestor Gtz MD sent at 11/3/2022  7:27 PM CDT -----  Immune deficiency found with protecton to only 2 if 13 pneumonia germs.  Igg level good.  Pt should have prevnar vaccine and repeat titer to evaluate response in 8 wks.  Would use abx liberally if any illness, need to discuss at follow up.    Doxycycline renewed, prevnar printed need titer rechecked in 8 wks or so. notify

## 2022-11-09 ENCOUNTER — OFFICE VISIT (OUTPATIENT)
Dept: ORTHOPEDICS | Facility: CLINIC | Age: 67
End: 2022-11-09
Payer: MEDICARE

## 2022-11-09 VITALS
DIASTOLIC BLOOD PRESSURE: 94 MMHG | WEIGHT: 121 LBS | SYSTOLIC BLOOD PRESSURE: 152 MMHG | BODY MASS INDEX: 20.66 KG/M2 | HEIGHT: 64 IN

## 2022-11-09 DIAGNOSIS — M50.30 DDD (DEGENERATIVE DISC DISEASE), CERVICAL: Primary | ICD-10-CM

## 2022-11-09 DIAGNOSIS — M62.838 CERVICAL PARASPINAL MUSCLE SPASM: ICD-10-CM

## 2022-11-09 DIAGNOSIS — M47.812 ARTHROPATHY OF CERVICAL FACET JOINT: ICD-10-CM

## 2022-11-09 PROCEDURE — 99203 PR OFFICE/OUTPT VISIT, NEW, LEVL III, 30-44 MIN: ICD-10-PCS | Mod: S$GLB,,, | Performed by: ORTHOPAEDIC SURGERY

## 2022-11-09 PROCEDURE — 99203 OFFICE O/P NEW LOW 30 MIN: CPT | Mod: S$GLB,,, | Performed by: ORTHOPAEDIC SURGERY

## 2022-11-09 NOTE — PROGRESS NOTES
Subjective:       Patient ID: Edith Jackson is a 67 y.o. female.    Chief Complaint: Pain of the Neck (Patient is having cervical pain, this pain started back in august, pain radiates to her right shoulder, pain worsens with activity )      History of Present Illness    Prior to meeting with the patient I reviewed the medical chart in [facility]. This included reviewing the previous progress notes from our office, review of the patient's last appointment with their primary care provider, review of any visits to the emergency room, and review of any pain management appointments or procedures.   Ms. Sharma presents to the clinic today as a new patient chief complaint of right-sided neck pain has been going on for approximately 4 months after she was zip lining out of town in [place].  She reports somewhat of a whiplash type injury with a sudden stop while zip lining.  She was seen by her primary care provider where she has tried heat muscle relaxer physical therapy all without much relief.  She was also seen by her pulmonologist due to an unrelated issue and he tried other treatment modalities with minimal relief.  He recommended follow-up with a spine specialist which brings with her our office today.    Current Medications  Current Outpatient Medications   Medication Sig Dispense Refill    albuterol (PROVENTIL/VENTOLIN HFA) 90 mcg/actuation inhaler 2 puffs every 4 hours as needed for cough, wheeze, or shortness of breath 18 g 11    amLODIPine (NORVASC) 5 MG tablet Take 1 tablet (5 mg total) by mouth once daily. 90 tablet 1    estradioL (ESTRACE) 0.01 % (0.1 mg/gram) vaginal cream Place 1 g vaginally twice a week. 8 g 11    fluticasone-salmeterol 230-21 mcg/dose (ADVAIR HFA) 230-21 mcg/actuation HFAA inhaler Inhale 2 puffs into the lungs 2 (two) times daily. Controller 12 g 11    HYDROcodone-acetaminophen (NORCO) 5-325 mg per tablet Take 1 tablet by mouth every 6 (six) hours as needed for Pain. 28 tablet 0     levothyroxine (SYNTHROID) 75 MCG tablet Take 1 tablet (75 mcg total) by mouth once daily. 90 tablet 3    metoprolol succinate (TOPROL-XL) 50 MG 24 hr tablet Take 1 tablet (50 mg total) by mouth once daily. 90 tablet 3    ondansetron (ZOFRAN) 4 MG tablet Take 1 tablet (4 mg total) by mouth every 6 (six) hours as needed for Nausea (vomiting). 12 tablet 0    rosuvastatin (CRESTOR) 10 MG tablet Take 1 tablet (10 mg total) by mouth once daily. 90 tablet 3    traZODone (DESYREL) 50 MG tablet Take 1 tablet (50 mg total) by mouth every evening. 90 tablet 3     No current facility-administered medications for this visit.       Allergies  Review of patient's allergies indicates:   Allergen Reactions    Percocet [oxycodone-acetaminophen] Other (See Comments)     Headaches    Penicillins Rash       Past Medical History  Past Medical History:   Diagnosis Date    Asthma     Hypertension     Hypotension, iatrogenic     Osteopenia     Respiratory distress     Thyroid disease        Surgical History  Past Surgical History:   Procedure Laterality Date    AUGMENTATION OF BREAST Bilateral     removal    BREAST RECONSTRUCTION      COLONOSCOPY N/A 3/17/2022    Procedure: COLONOSCOPY;  Surgeon: Alessandra Springer MD;  Location: [facility];  Service: General;  Laterality: N/A;    HIP REPLACEMENT ARTHROPLASTY Right        Family History:   Family History   Problem Relation Age of Onset    Diabetes Mother     Stroke Mother     Heart attack Mother     Thyroid disease Mother     Heart attack Father     Breast cancer Neg Hx     Ovarian cancer Neg Hx        Social History:   Social History     Socioeconomic History    Marital status: Single   Tobacco Use    Smoking status: Never    Smokeless tobacco: Never   Substance and Sexual Activity    Alcohol use: Yes     Comment: socially    Drug use: Not Currently     Social Determinants of Health     Financial Resource Strain: Low Risk     Difficulty of Paying Living Expenses: Not very hard   Food  Insecurity: Unknown    Worried About Running Out of Food in the Last Year: Patient refused    Ran Out of Food in the Last Year: Patient refused   Transportation Needs: Unknown    Lack of Transportation (Medical): Patient refused    Lack of Transportation (Non-Medical): Patient refused   Physical Activity: Unknown    Days of Exercise per Week: Patient refused   Stress: Unknown    Feeling of Stress : Patient refused   Social Connections: Unknown    Frequency of Communication with Friends and Family: Patient refused    Frequency of Social Gatherings with Friends and Family: Patient refused    Active Member of Clubs or Organizations: Patient refused    Attends Club or Organization Meetings: Patient refused    Marital Status: Patient refused   Housing Stability: Unknown    Unable to Pay for Housing in the Last Year: Patient refused    Unstable Housing in the Last Year: Patient refused       Hospitalization/Major Diagnostic Procedure:     Review of Systems     General/Constitutional:  Chills denies. Fatigue denies. Fever denies. Weight gain denies. Weight loss denies.    Respiratory:  Shortness of breath denies.    Cardiovascular:  Chest pain denies.    Gastrointestinal:  Constipation denies. Diarrhea denies. Nausea denies. Vomiting denies.     Hematology:  Easy bruising denies. Prolonged bleeding denies.     Genitourinary:  Frequent urination denies. Pain in lower back denies. Painful urination denies.     Musculoskeletal:  See HPI for details    Skin:  Rash denies.    Neurologic:  Dizziness denies. Gait abnormalities denies. Seizures denies. Tingling/Numbess denies.    Psychiatric:  Anxiety denies. Depressed mood denies.     Objective:   Vital Signs:   Vitals:    11/09/22 1014   BP: (!) 152/94        Physical Exam      General Examination:     Constitutional: The patient is alert and oriented to lace person and time. Mood is pleasant.     Head/Face: Normal facial features normal eyebrows    Eyes: Normal extraocular  motion bilaterally    Lungs: Respirations are equal and unlabored    Gait is coordinated.    Cardiovascular: There are no swelling or varicosities present.    Lymphatic: Negative for adenopathy    Skin: Normal    Neurological: Level of consciousness normal. Oriented to place person and time and situation    Psychiatric: Oriented to time place person and situation    C-spine exam:  Skin the patient's neck anterior and posteriorly is clean dry and intact.  There is no erythema or ecchymosis.  There are no signs or symptoms of infection.  Patient is neurovascularly intact throughout bilateral upper extremities.  She has full active range of motion of bilateral shoulders with forward flexion, external rotation, internal rotation, and abduction.  Muscle strength would grade 5/5 and is equal bilaterally to her upper extremities.  She is tender to palpation in her right paraspinal cervical muscles as well as into her right trapezius.  She has pain with extremes of motion of the cervical spine forward flexion, extension, lateral bending bilaterally, and rotation bilaterally.  She does have crepitus with range of motioning of her cervical spine.  She has no radicular symptoms into either upper extremity.  Spurling sign is negative.    XRAY Report/ Interpretation:  No new radiographs were taken in our clinic today.  However, did review cervical spine images with AP and lateral views taken by her primary care provider which did reveal loss of the normal cervical lordosis, mild degenerative disc disease throughout the cervical spine with endplate changes and osteophytes had multiple levels.  There are no acute fractures or dislocations.  There was no spondylolisthesis.    Assessment:       1. DDD (degenerative disc disease), cervical    2. Cervical spondylitis        Plan:       Edith was seen today for pain.    Diagnoses and all orders for this visit:    DDD (degenerative disc disease), cervical    Cervical spondylitis        No follow-ups on file.  This is to attest that the physician's assistant Dewey Huang served in the capacity as scribe for this patient's encounter.  This is also to verify that I have reviewed the patient's history and helped formulate the treatment plan and discussed it with the physician's assistant.  I have actively participated in the evaluation and treatment plan for this patient visit.  The treatment plan and medical decision-making is outlined below      Considering she has not responded to conservative treatment measures with heat, muscle relaxer, physical therapy, NSAIDs, and pain medication, and this is bothering her on a daily basis and interfering with her activities of daily living, we will proceed with an MRI of her cervical spine to further evaluate her neck.  We will have her follow-up with that data and make further orthopedic recommendations from there.  She may be a candidate for epidural steroid injections pending the results of the MRI.    Treatment options were discussed with regards to the nature of the medical condition. Conservative pain intervention and surgical options were discussed in detail. The probability of success of each separate treatment option was discussed. The patient expressed a clear understanding of the treatment options. With regards to surgery, the procedure risk, benefits, complications, and outcomes were discussed. No guarantees were given with regards to surgical outcome.   The risk of complications, morbidity, and mortality of patient management decisions have been made at the time of this visit. These are associated with the patient's problems, diagnostic procedures and treatment options. This includes the possible management options selected and those considered but not selected by the patient after shared medical decision making we discussed with the patient.     This note was created using Dragon voice recognition software that occasionally misinterpreted  phrases or words.

## 2022-11-14 ENCOUNTER — HOSPITAL ENCOUNTER (OUTPATIENT)
Dept: RADIOLOGY | Facility: HOSPITAL | Age: 67
Discharge: HOME | End: 2022-11-14
Attending: ORTHOPAEDIC SURGERY
Payer: MEDICARE

## 2022-11-14 DIAGNOSIS — M50.30 DDD (DEGENERATIVE DISC DISEASE), CERVICAL: ICD-10-CM

## 2022-11-14 DIAGNOSIS — M47.812 ARTHROPATHY OF CERVICAL FACET JOINT: ICD-10-CM

## 2022-11-14 PROCEDURE — 72141 MRI CERVICAL SPINE WITHOUT CONTRAST: ICD-10-PCS | Mod: 26,,, | Performed by: RADIOLOGY

## 2022-11-14 PROCEDURE — 72141 MRI NECK SPINE W/O DYE: CPT | Mod: TC

## 2022-11-14 PROCEDURE — 72141 MRI NECK SPINE W/O DYE: CPT | Mod: 26,,, | Performed by: RADIOLOGY

## 2022-11-16 ENCOUNTER — OFFICE VISIT (OUTPATIENT)
Dept: ORTHOPEDICS | Facility: CLINIC | Age: 67
End: 2022-11-16
Payer: MEDICARE

## 2022-11-16 VITALS
WEIGHT: 121 LBS | DIASTOLIC BLOOD PRESSURE: 84 MMHG | SYSTOLIC BLOOD PRESSURE: 130 MMHG | BODY MASS INDEX: 20.66 KG/M2 | HEIGHT: 64 IN

## 2022-11-16 DIAGNOSIS — M50.30 DDD (DEGENERATIVE DISC DISEASE), CERVICAL: Primary | ICD-10-CM

## 2022-11-16 DIAGNOSIS — M47.812 ARTHROPATHY OF CERVICAL FACET JOINT: ICD-10-CM

## 2022-11-16 DIAGNOSIS — M62.838 CERVICAL PARASPINAL MUSCLE SPASM: ICD-10-CM

## 2022-11-16 DIAGNOSIS — M46.02 SPINAL ENTHESOPATHY OF CERVICAL REGION: ICD-10-CM

## 2022-11-16 PROCEDURE — 99213 OFFICE O/P EST LOW 20 MIN: CPT | Mod: 25,S$GLB,, | Performed by: ORTHOPAEDIC SURGERY

## 2022-11-16 PROCEDURE — 99213 PR OFFICE/OUTPT VISIT, EST, LEVL III, 20-29 MIN: ICD-10-PCS | Mod: 25,S$GLB,, | Performed by: ORTHOPAEDIC SURGERY

## 2022-11-16 PROCEDURE — 20552 NJX 1/MLT TRIGGER POINT 1/2: CPT | Mod: S$GLB,,, | Performed by: ORTHOPAEDIC SURGERY

## 2022-11-16 PROCEDURE — 20552 TRIGGER POINT INJECTION: ICD-10-PCS | Mod: S$GLB,,, | Performed by: ORTHOPAEDIC SURGERY

## 2022-11-16 RX ORDER — TRIAMCINOLONE ACETONIDE 40 MG/ML
40 INJECTION, SUSPENSION INTRA-ARTICULAR; INTRAMUSCULAR
Status: DISCONTINUED | OUTPATIENT
Start: 2022-11-16 | End: 2022-11-16 | Stop reason: HOSPADM

## 2022-11-16 RX ADMIN — TRIAMCINOLONE ACETONIDE 40 MG: 40 INJECTION, SUSPENSION INTRA-ARTICULAR; INTRAMUSCULAR at 08:11

## 2022-11-16 NOTE — PROCEDURES
Trigger Point Injection  Performed by: Lane Mathews MD  Authorized by: Lane Mathews MD     Cervical Paraspinal:  Right    Consent Done?:  Yes (Verbal)    Site marked: the procedure site was marked     Timeout: prior to procedure the correct patient, procedure, and site was verified    Prep: patient was prepped and draped in usual sterile fashion     Local anesthesia used?: Yes    Local anesthetic:  Lidocaine 1% without epinephrine  Medications: 40 mg triamcinolone acetonide 40 mg/mL

## 2022-11-16 NOTE — PROGRESS NOTES
Subjective:       Patient ID: Edith Jackson is a 67 y.o. female.    Chief Complaint: Pain of the Neck (Patient is here for MRI results of Cervical Spine, states her pain is the same as her last visit. )      History of Present Illness    Prior to meeting with the patient I reviewed the medical chart in [facility]. This included reviewing the previous progress notes from our office, review of the patient's last appointment with their primary care provider, review of any visits to the emergency room, and review of any pain management appointments or procedures.   Patient is here follow-up for chief complaint of right-sided neck pain has been going on for approximately 4 months after she was zip lining out of town in [place].  She has an updated cervical MRI to review today.    Physical therapy has failed to improve her symptoms.    Current Medications  Current Outpatient Medications   Medication Sig Dispense Refill    albuterol (PROVENTIL/VENTOLIN HFA) 90 mcg/actuation inhaler 2 puffs every 4 hours as needed for cough, wheeze, or shortness of breath 18 g 11    amLODIPine (NORVASC) 5 MG tablet Take 1 tablet (5 mg total) by mouth once daily. 90 tablet 1    estradioL (ESTRACE) 0.01 % (0.1 mg/gram) vaginal cream Place 1 g vaginally twice a week. 8 g 11    fluticasone-salmeterol 230-21 mcg/dose (ADVAIR HFA) 230-21 mcg/actuation HFAA inhaler Inhale 2 puffs into the lungs 2 (two) times daily. Controller 12 g 11    levothyroxine (SYNTHROID) 75 MCG tablet Take 1 tablet (75 mcg total) by mouth once daily. 90 tablet 3    metoprolol succinate (TOPROL-XL) 50 MG 24 hr tablet Take 1 tablet (50 mg total) by mouth once daily. 90 tablet 3    ondansetron (ZOFRAN) 4 MG tablet Take 1 tablet (4 mg total) by mouth every 6 (six) hours as needed for Nausea (vomiting). 12 tablet 0    rosuvastatin (CRESTOR) 10 MG tablet Take 1 tablet (10 mg total) by mouth once daily. 90 tablet 3    traZODone (DESYREL) 50 MG tablet Take 1 tablet (50 mg total)  by mouth every evening. 90 tablet 3    HYDROcodone-acetaminophen (NORCO) 5-325 mg per tablet Take 1 tablet by mouth every 6 (six) hours as needed for Pain. (Patient not taking: Reported on 11/16/2022) 28 tablet 0     No current facility-administered medications for this visit.       Allergies  Review of patient's allergies indicates:   Allergen Reactions    Percocet [oxycodone-acetaminophen] Other (See Comments)     Headaches    Penicillins Rash       Past Medical History  Past Medical History:   Diagnosis Date    Asthma     Hypertension     Hypotension, iatrogenic     Osteopenia     Respiratory distress     Thyroid disease        Surgical History  Past Surgical History:   Procedure Laterality Date    AUGMENTATION OF BREAST Bilateral     removal    BREAST RECONSTRUCTION      COLONOSCOPY N/A 3/17/2022    Procedure: COLONOSCOPY;  Surgeon: Alessandra Springer MD;  Location: [facility];  Service: General;  Laterality: N/A;    HIP REPLACEMENT ARTHROPLASTY Right        Family History:   Family History   Problem Relation Age of Onset    Diabetes Mother     Stroke Mother     Heart attack Mother     Thyroid disease Mother     Heart attack Father     Breast cancer Neg Hx     Ovarian cancer Neg Hx        Social History:   Social History     Socioeconomic History    Marital status: Single   Tobacco Use    Smoking status: Never    Smokeless tobacco: Never   Substance and Sexual Activity    Alcohol use: Yes     Comment: socially    Drug use: Not Currently     Social Determinants of Health     Financial Resource Strain: Low Risk     Difficulty of Paying Living Expenses: Not very hard   Food Insecurity: Unknown    Worried About Running Out of Food in the Last Year: Patient refused    Ran Out of Food in the Last Year: Patient refused   Transportation Needs: Unknown    Lack of Transportation (Medical): Patient refused    Lack of Transportation (Non-Medical): Patient refused   Physical Activity: Unknown    Days of Exercise per Week:  Patient refused   Stress: Unknown    Feeling of Stress : Patient refused   Social Connections: Unknown    Frequency of Communication with Friends and Family: Patient refused    Frequency of Social Gatherings with Friends and Family: Patient refused    Active Member of Clubs or Organizations: Patient refused    Attends Club or Organization Meetings: Patient refused    Marital Status: Patient refused   Housing Stability: Unknown    Unable to Pay for Housing in the Last Year: Patient refused    Unstable Housing in the Last Year: Patient refused       Hospitalization/Major Diagnostic Procedure:     Review of Systems     General/Constitutional:  Chills denies. Fatigue denies. Fever denies. Weight gain denies. Weight loss denies.    Respiratory:  Shortness of breath denies.    Cardiovascular:  Chest pain denies.    Gastrointestinal:  Constipation denies. Diarrhea denies. Nausea denies. Vomiting denies.     Hematology:  Easy bruising denies. Prolonged bleeding denies.     Genitourinary:  Frequent urination denies. Pain in lower back denies. Painful urination denies.     Musculoskeletal:  See HPI for details    Skin:  Rash denies.    Neurologic:  Dizziness denies. Gait abnormalities denies. Seizures denies. Tingling/Numbess denies.    Psychiatric:  Anxiety denies. Depressed mood denies.     Objective:   Vital Signs:   Vitals:    11/16/22 0803   BP: 130/84        Physical Exam      General Examination:     Constitutional: The patient is alert and oriented to lace person and time. Mood is pleasant.     Head/Face: Normal facial features normal eyebrows    Eyes: Normal extraocular motion bilaterally    Lungs: Respirations are equal and unlabored    Gait is coordinated.    Cardiovascular: There are no swelling or varicosities present.    Lymphatic: Negative for adenopathy    Skin: Normal    Neurological: Level of consciousness normal. Oriented to place person and time and situation    Psychiatric: Oriented to time place person  and situation    Cervical exam is relatively unchanged she has a palpable muscle spasm and trigger point of the right sternocleidomastoid proximally.  Pain with right rotation.    XRAY Report/ Interpretation:  Cervical MRI reviewed with the patient in the office today and demonstrates some multilevel degenerative changes with a slight reversal of her normal cervical lordosis.  At C3-4 there is some right-sided facet arthropathy and a left paracentral disc extrusion causing significant foraminal stenosis.  At C5-6 there is a left paracentral disc extrusion causing some significant foraminal stenosis.  Please see the radiology report for greater detail.      Assessment:       1. DDD (degenerative disc disease), cervical    2. Spinal enthesopathy of cervical region    3. Arthropathy of cervical facet joint    4. Cervical paraspinal muscle spasm        Plan:       Edith was seen today for pain.    Diagnoses and all orders for this visit:    DDD (degenerative disc disease), cervical    Spinal enthesopathy of cervical region    Arthropathy of cervical facet joint    Cervical paraspinal muscle spasm       No follow-ups on file.  This is to attest that the physician's assistant Leonel Escobar served in the capacity as a scribe for this patient's encounter.  This is also to verify that I have reviewed the patient's history and helped formulate the treatment plan and discussed it with the physician's assistant.  I have actively participated  in the evaluation and treatment plan for this patient visit.  The treatment plan and medical decision-making is as outlined below:  Today she received a right superior sternocleidomastoid trigger point injection with 1 cc of lidocaine and 40 mg of Kenalog.  Please see procedure report for details.  I recommend that she follow-up for her already scheduled evaluation with pain management.  She may possibly also benefit from some cervical medial branch blocks with progression towards  rhizotomy.    Treatment options were discussed with regards to the nature of the medical condition. Conservative pain intervention and surgical options were discussed in detail. The probability of success of each separate treatment option was discussed. The patient expressed a clear understanding of the treatment options. With regards to surgery, the procedure risk, benefits, complications, and outcomes were discussed. No guarantees were given with regards to surgical outcome.   The risk of complications, morbidity, and mortality of patient management decisions have been made at the time of this visit. These are associated with the patient's problems, diagnostic procedures and treatment options. This includes the possible management options selected and those considered but not selected by the patient after shared medical decision making we discussed with the patient.     This note was created using Dragon voice recognition software that occasionally misinterpreted phrases or words.

## 2022-12-08 ENCOUNTER — TELEPHONE (OUTPATIENT)
Dept: PAIN MEDICINE | Facility: CLINIC | Age: 67
End: 2022-12-08

## 2022-12-08 ENCOUNTER — OFFICE VISIT (OUTPATIENT)
Dept: PAIN MEDICINE | Facility: CLINIC | Age: 67
End: 2022-12-08
Payer: MEDICARE

## 2022-12-08 VITALS
WEIGHT: 121.06 LBS | HEART RATE: 71 BPM | HEIGHT: 64 IN | SYSTOLIC BLOOD PRESSURE: 150 MMHG | DIASTOLIC BLOOD PRESSURE: 98 MMHG | BODY MASS INDEX: 20.67 KG/M2

## 2022-12-08 DIAGNOSIS — M54.12 CERVICAL RADICULITIS: ICD-10-CM

## 2022-12-08 DIAGNOSIS — M54.2 NECK PAIN: ICD-10-CM

## 2022-12-08 DIAGNOSIS — M50.30 DDD (DEGENERATIVE DISC DISEASE), CERVICAL: Primary | ICD-10-CM

## 2022-12-08 DIAGNOSIS — M47.892 OTHER SPONDYLOSIS, CERVICAL REGION: ICD-10-CM

## 2022-12-08 DIAGNOSIS — M54.12 CERVICAL RADICULITIS: Primary | ICD-10-CM

## 2022-12-08 PROCEDURE — 99999 PR PBB SHADOW E&M-EST. PATIENT-LVL III: CPT | Mod: PBBFAC,,, | Performed by: ANESTHESIOLOGY

## 2022-12-08 PROCEDURE — 99999 PR PBB SHADOW E&M-EST. PATIENT-LVL III: ICD-10-PCS | Mod: PBBFAC,,, | Performed by: ANESTHESIOLOGY

## 2022-12-08 PROCEDURE — 99213 OFFICE O/P EST LOW 20 MIN: CPT | Mod: PBBFAC,PN | Performed by: ANESTHESIOLOGY

## 2022-12-08 PROCEDURE — 99204 OFFICE O/P NEW MOD 45 MIN: CPT | Mod: S$PBB,,, | Performed by: ANESTHESIOLOGY

## 2022-12-08 PROCEDURE — 99204 PR OFFICE/OUTPT VISIT, NEW, LEVL IV, 45-59 MIN: ICD-10-PCS | Mod: S$PBB,,, | Performed by: ANESTHESIOLOGY

## 2022-12-08 NOTE — PROGRESS NOTES
This note was completed with dictation software and grammatical errors may exist.    Referring Physician: Nestor Gtz MD    PCP: Shanice Yang MD      CC: neck pain    HPI:   Edith Jackson is a 67 y.o. female referred to us for neck pain.  Neck pain has gradually worsened over the past 4 months.  No recent traumatic incident.  She has constant aching, burning, grabbing, sharp, deep pain in her posterior neck pain radiates to her bilateral shoulders.  Pain worsens with sitting, standing, bending, coughing walking.  Pain improves with rest.  She takes NSAIDs with mild benefits.  She has tried physical therapy with minimal benefit.  She underwent trigger point injections recently with minimal benefit.  She recently had MRI of the cervical spine.  He was evaluated by spine surgery referred to us for further interventional procedures.  She denies any worsening weakness.  No bowel bladder changes.    ROS:  CONSTITUTIONAL: No fevers, chills, night sweats, wt. loss, appetite changes  SKIN: no rashes or itching  ENT: No headaches, head trauma, vision changes, or eye pain  LYMPH NODES: None noticed   CV: No chest pain, palpitations.   RESP: No shortness of breath, dyspnea on exertion, cough, wheezing, or hemoptysis  GI: No nausea, emesis, diarrhea, constipation, melena, hematochezia, pain.    : No dysuria, hematuria, urgency, or frequency   HEME: No easy bruising, bleeding problems  PSYCHIATRIC: No depression, anxiety, psychosis, hallucinations.  NEURO: No seizures, memory loss, dizziness or difficulty sleeping  MSK:  Positive HPI      Past Medical History:   Diagnosis Date    Asthma     Hypertension     Hypotension, iatrogenic     Osteopenia     Respiratory distress     Thyroid disease      Past Surgical History:   Procedure Laterality Date    AUGMENTATION OF BREAST Bilateral     removal    BREAST RECONSTRUCTION      COLONOSCOPY N/A 3/17/2022    Procedure: COLONOSCOPY;  Surgeon: Alessandra Springer MD;   "Location: [facility];  Service: General;  Laterality: N/A;    HIP REPLACEMENT ARTHROPLASTY Right      Family History   Problem Relation Age of Onset    Diabetes Mother     Stroke Mother     Heart attack Mother     Thyroid disease Mother     Heart attack Father     Breast cancer Neg Hx     Ovarian cancer Neg Hx      Social History     Socioeconomic History    Marital status: Single   Tobacco Use    Smoking status: Never    Smokeless tobacco: Never   Substance and Sexual Activity    Alcohol use: Yes     Comment: socially    Drug use: Not Currently     Social Determinants of Health     Financial Resource Strain: Low Risk     Difficulty of Paying Living Expenses: Not very hard   Food Insecurity: Unknown    Worried About Running Out of Food in the Last Year: Patient refused    Ran Out of Food in the Last Year: Patient refused   Transportation Needs: Unknown    Lack of Transportation (Medical): Patient refused    Lack of Transportation (Non-Medical): Patient refused   Physical Activity: Unknown    Days of Exercise per Week: Patient refused   Stress: Unknown    Feeling of Stress : Patient refused   Social Connections: Unknown    Frequency of Communication with Friends and Family: Patient refused    Frequency of Social Gatherings with Friends and Family: Patient refused    Active Member of Clubs or Organizations: Patient refused    Attends Club or Organization Meetings: Patient refused    Marital Status: Patient refused   Housing Stability: Unknown    Unable to Pay for Housing in the Last Year: Patient refused    Unstable Housing in the Last Year: Patient refused         Medications/Allergies: See med card    Vitals:    12/08/22 1007   BP: (!) 150/98   Pulse: 71   Weight: 54.9 kg (121 lb 0.5 oz)   Height: 5' 4" (1.626 m)   PainSc:   4   PainLoc: Neck         Physical exam:    GENERAL: A and O x3, the patient appears well groomed and is in no acute distress.  Skin: No rashes or obvious lesions  HEENT: normocephalic, " atraumatic  CARDIOVASCULAR:  Palpable peripheral pulses  LUNGS: easy work of breathing  ABDOMEN: soft, nontender   UPPER EXTREMITIES: Normal alignment, normal range of motion, no atrophy, no skin changes,  hair growth and nail growth normal and equal bilaterally. No swelling, no tenderness.    LOWER EXTREMITIES:  Normal alignment, normal range of motion, no atrophy, no skin changes,  hair growth and nail growth normal and equal bilaterally. No swelling, no tenderness.  CERVICAL SPINE:  Cervical spine: ROM is limited in flexion, extension and lateral rotation with moderate increased pain.  Spurling's maneuver causes  neck pain to either side.  Myofascial exam: No Tenderness to palpation across cervical paraspinous region bilaterally.    MENTAL STATUS: normal orientation, speech, language, and fund of knowledge for social situation.  Emotional state appropriate.    MOTOR: Tone and bulk: normal bilateral upper and lower Strength: normal   SENSATION: Light touch and pinprick intact bilaterally  REFLEXES: normal, symmetric, nonbrisk.  Toes down, no clonus. No hoffmans.  GAIT: normal rise, base, steps, and arm swing.        Imaging:  MRI C-spine 11/2022  Impression:     1. Reversal of normal cervical lordosis.  2. Marrow edema along the right C2-3 and left C3-4 facets, presumably degenerative.  3. Multilevel cervical degenerative change including left-sided disc extrusions at C3-4 and C5-6.  Degenerative findings contribute to neural foraminal narrowing severe on the left at C3-4 and C5-6.  4. No high-grade spinal canal stenosis.    Assessment:  Patient presents with neck pain.  1. DDD (degenerative disc disease), cervical    2. Neck pain    3. Cervical radiculitis    4. Other spondylosis, cervical region          Plan:  I have stressed the importance of physical activity and exercise to improve overall health  Reviewed pertinent imaging and records with patient  I think that the patient's neck pain and radicular arm  symptoms are due to degenerative disc disease and have recommended a cervical epidural steroid injection.   May consider cervical MBB if above is not helpful  Follow up after procedure    Thank you for referring this interesting patient, and I look forward to continuing to collaborate in her care.

## 2022-12-08 NOTE — TELEPHONE ENCOUNTER
Order Date:12/8/2022   Ordering User:MADELEINE THOMASON [[id number]]   Encounter Provider:Madeleine Thomason MD [[id number]]   Author   kenan Provider: Madeleine Thomason MD [[id number]]   Department:[facility] PAIN MANAGEMENT[[id number]]      Common Order Information   Procedure -> Epidural Injection (specify level) Cmt: C7-T1      Order Specific Information   Order: Procedure Order to Pain Management [Custom: [id number]]  Order #:           [id number]: 1 FUTURE     Priority: Routine  Class: Clinic Performed     Future Order Information       Expires on:12/08 /2023            Expected by:12/08/2022                    Associated Diagnoses       M50.30 DDD (degenerative disc disease), cervical       Facility Name: -> [facility]               Priority: Routine  Class: Clinic Performed     Future Order Information       Expires on:12/08/2023            Expected by:12/08/2022                    Associated Diagnoses       M50.30 DDD (degenerative disc disease), cervical       Procedur   e -> Epidural Injection (specify level) Cmt: C7-T1          Facility Name: -> [facility]        No answer and voice mail not set up

## 2022-12-12 ENCOUNTER — TELEPHONE (OUTPATIENT)
Dept: OBSTETRICS AND GYNECOLOGY | Facility: CLINIC | Age: 67
End: 2022-12-12
Payer: MEDICARE

## 2022-12-12 NOTE — TELEPHONE ENCOUNTER
----- Message from Yi Jordan, Patient Care Assistant sent at 12/12/2022  1:55 PM CST -----  Regarding: appointment  Contact: pt  Type:  Sooner Appointment Request    Caller is requesting a sooner appointment.  Caller declined first available appointment listed below.  Caller will not accept being placed on the waitlist and is requesting a message be sent to doctor.    Name of Caller:  pt   When is the first available appointment? 2/2023  Symptoms:  bleeding   Best Call Back Number:  [phone] (home)     Additional Information:  please call pt to advise. Thanks!

## 2022-12-14 ENCOUNTER — OFFICE VISIT (OUTPATIENT)
Dept: OBSTETRICS AND GYNECOLOGY | Facility: CLINIC | Age: 67
End: 2022-12-14
Payer: MEDICARE

## 2022-12-14 ENCOUNTER — PATIENT MESSAGE (OUTPATIENT)
Dept: ORTHOPEDICS | Facility: CLINIC | Age: 67
End: 2022-12-14

## 2022-12-14 ENCOUNTER — PATIENT MESSAGE (OUTPATIENT)
Dept: OBSTETRICS AND GYNECOLOGY | Facility: CLINIC | Age: 67
End: 2022-12-14

## 2022-12-14 VITALS
WEIGHT: 121.94 LBS | RESPIRATION RATE: 16 BRPM | SYSTOLIC BLOOD PRESSURE: 138 MMHG | DIASTOLIC BLOOD PRESSURE: 72 MMHG | BODY MASS INDEX: 20.82 KG/M2 | HEIGHT: 64 IN

## 2022-12-14 DIAGNOSIS — N95.0 POST-MENOPAUSAL BLEEDING: Primary | ICD-10-CM

## 2022-12-14 PROCEDURE — 58100 ENDOMETRIAL BIOPSY: ICD-10-PCS | Mod: S$PBB,,, | Performed by: STUDENT IN AN ORGANIZED HEALTH CARE EDUCATION/TRAINING PROGRAM

## 2022-12-14 PROCEDURE — 88305 TISSUE EXAM BY PATHOLOGIST: CPT | Mod: 26,,, | Performed by: PATHOLOGY

## 2022-12-14 PROCEDURE — 99999 PR PBB SHADOW E&M-EST. PATIENT-LVL IV: ICD-10-PCS | Mod: PBBFAC,,, | Performed by: STUDENT IN AN ORGANIZED HEALTH CARE EDUCATION/TRAINING PROGRAM

## 2022-12-14 PROCEDURE — 99204 OFFICE O/P NEW MOD 45 MIN: CPT | Mod: 25,S$PBB,, | Performed by: STUDENT IN AN ORGANIZED HEALTH CARE EDUCATION/TRAINING PROGRAM

## 2022-12-14 PROCEDURE — 88305 TISSUE EXAM BY PATHOLOGIST: ICD-10-PCS | Mod: 26,,, | Performed by: PATHOLOGY

## 2022-12-14 PROCEDURE — 58100 BIOPSY OF UTERUS LINING: CPT | Mod: PBBFAC,PN | Performed by: STUDENT IN AN ORGANIZED HEALTH CARE EDUCATION/TRAINING PROGRAM

## 2022-12-14 PROCEDURE — 99204 PR OFFICE/OUTPT VISIT, NEW, LEVL IV, 45-59 MIN: ICD-10-PCS | Mod: 25,S$PBB,, | Performed by: STUDENT IN AN ORGANIZED HEALTH CARE EDUCATION/TRAINING PROGRAM

## 2022-12-14 PROCEDURE — 87624 HPV HI-RISK TYP POOLED RSLT: CPT | Performed by: STUDENT IN AN ORGANIZED HEALTH CARE EDUCATION/TRAINING PROGRAM

## 2022-12-14 PROCEDURE — 99214 OFFICE O/P EST MOD 30 MIN: CPT | Mod: PBBFAC,PN | Performed by: STUDENT IN AN ORGANIZED HEALTH CARE EDUCATION/TRAINING PROGRAM

## 2022-12-14 PROCEDURE — 88305 TISSUE EXAM BY PATHOLOGIST: CPT | Performed by: PATHOLOGY

## 2022-12-14 PROCEDURE — 88175 CYTOPATH C/V AUTO FLUID REDO: CPT | Performed by: STUDENT IN AN ORGANIZED HEALTH CARE EDUCATION/TRAINING PROGRAM

## 2022-12-14 PROCEDURE — 99999 PR PBB SHADOW E&M-EST. PATIENT-LVL IV: CPT | Mod: PBBFAC,,, | Performed by: STUDENT IN AN ORGANIZED HEALTH CARE EDUCATION/TRAINING PROGRAM

## 2022-12-14 NOTE — H&P (VIEW-ONLY)
"History & Physical  Gynecology      SUBJECTIVE:     Chief Complaint: Establish Care and PMB       History of Present Illness:    67 y.o. here today for 1 week hx of PMB. States it is dark brown and "clotty". No hx of PMB. No hx of abnormal paps. No FH of uterine cancer. Not sexually active. Is using estradiol vaginal cream and has for about 1 year.     Review of patient's allergies indicates:   Allergen Reactions    Percocet [oxycodone-acetaminophen] Other (See Comments)     Headaches    Penicillins Rash       Past Medical History:   Diagnosis Date    Asthma     Hypertension     Hypotension, iatrogenic     Osteopenia     Respiratory distress     Thyroid disease      Past Surgical History:   Procedure Laterality Date    AUGMENTATION OF BREAST Bilateral     removal    BREAST RECONSTRUCTION      COLONOSCOPY N/A 3/17/2022    Procedure: COLONOSCOPY;  Surgeon: Alessandra Springer MD;  Location: [facility];  Service: General;  Laterality: N/A;    HIP REPLACEMENT ARTHROPLASTY Right      OB History          2    Para   2    Term   2            AB        Living             SAB        IAB        Ectopic        Multiple        Live Births                   Family History   Problem Relation Age of Onset    Diabetes Mother     Stroke Mother     Heart attack Mother     Thyroid disease Mother     Heart attack Father     Breast cancer Neg Hx     Ovarian cancer Neg Hx      Social History     Tobacco Use    Smoking status: Never    Smokeless tobacco: Never   Substance Use Topics    Alcohol use: Yes     Comment: socially    Drug use: Never       Current Outpatient Medications   Medication Sig    albuterol (PROVENTIL/VENTOLIN HFA) 90 mcg/actuation inhaler 2 puffs every 4 hours as needed for cough, wheeze, or shortness of breath    amLODIPine (NORVASC) 5 MG tablet Take 1 tablet (5 mg total) by mouth once daily.    estradioL (ESTRACE) 0.01 % (0.1 mg/gram) vaginal cream Place 1 g vaginally twice a week.    fluticasone-salmeterol " 230-21 mcg/dose (ADVAIR HFA) 230-21 mcg/actuation HFAA inhaler Inhale 2 puffs into the lungs 2 (two) times daily. Controller    HYDROcodone-acetaminophen (NORCO) 5-325 mg per tablet Take 1 tablet by mouth every 6 (six) hours as needed for Pain. (Patient not taking: Reported on 11/16/2022)    levothyroxine (SYNTHROID) 75 MCG tablet Take 1 tablet (75 mcg total) by mouth once daily.    metoprolol succinate (TOPROL-XL) 50 MG 24 hr tablet Take 1 tablet (50 mg total) by mouth once daily.    ondansetron (ZOFRAN) 4 MG tablet Take 1 tablet (4 mg total) by mouth every 6 (six) hours as needed for Nausea (vomiting).    rosuvastatin (CRESTOR) 10 MG tablet Take 1 tablet (10 mg total) by mouth once daily.    traZODone (DESYREL) 50 MG tablet Take 1 tablet (50 mg total) by mouth every evening.     No current facility-administered medications for this visit.         Review of Systems:  Review of Systems   Constitutional:  Negative for chills, fatigue and fever.   HENT:  Negative for congestion.    Eyes:  Negative for visual disturbance.   Respiratory:  Negative for cough and shortness of breath.    Cardiovascular:  Negative for chest pain and palpitations.   Gastrointestinal:  Negative for abdominal distention, abdominal pain, constipation, diarrhea, nausea and vomiting.   Genitourinary:  Negative for difficulty urinating, dysuria, hematuria, vaginal bleeding and vaginal discharge.   Skin:  Negative for rash.   Neurological:  Negative for dizziness, seizures, light-headedness and headaches.   Hematological:  Does not bruise/bleed easily.   Psychiatric/Behavioral:  Negative for dysphoric mood. The patient is not nervous/anxious.       OBJECTIVE:     Physical Exam:  Physical Exam  Vitals reviewed.   Constitutional:       General: She is not in acute distress.     Appearance: Normal appearance. She is well-developed.   HENT:      Head: Normocephalic and atraumatic.   Cardiovascular:      Rate and Rhythm: Normal rate and regular rhythm.    Pulmonary:      Effort: Pulmonary effort is normal.   Abdominal:      General: There is no distension.      Palpations: Abdomen is soft.   Genitourinary:     Vagina: Normal.      Comments: Normal external female genitalia, normal hair distribution. Vaginal mucosa pink, moist, well rugated, scant white physiologic discharge. Scant old blood in vault. Cervix pink, non-friable, without lesion. No CMT. Uterus non tender, mobile, not enlarged. Adnexa without fullness or tenderness.    Skin:     General: Skin is warm.   Neurological:      Mental Status: She is alert and oriented to person, place, and time.   Psychiatric:         Behavior: Behavior normal.         Thought Content: Thought content normal.         Judgment: Judgment normal.         ASSESSMENT:       ICD-10-CM ICD-9-CM    1. Post-menopausal bleeding  N95.0 627.1 Liquid-Based Pap Smear, Screening      HPV High Risk Genotypes, PCR      US Pelvis Comp with Transvag NON-OB (xpd      Specimen to Pathology, Ob/Gyn          Orders Placed This Encounter   Procedures    HPV High Risk Genotypes, PCR     Order Specific Question:   Source     Answer:   Cervix     Order Specific Question:   Release to patient     Answer:   Immediate    US Pelvis Comp with Transvag NON-OB (xpd     Standing Status:   Future     Standing Expiration Date:   12/14/2023     Order Specific Question:   May the Radiologist modify the order per protocol to meet the clinical needs of the patient?     Answer:   Yes     Order Specific Question:   Release to patient     Answer:   Immediate           Plan:      PMB:  - Discussed most common cause of PMB is uterine atrophy. Discussed differential dx including atrophy vs polyp, vs EIN or cancer. Discussed role of TVUS vs EMBx. EMS of 4 mm or less on TVUS has >99% NPV for endometrial cancer. Ongoing bleeding still requires tissue sample even if EMS is normal.  EMBx is reasonable first line choice or can be performed if bleeding persists. 10% of women  with PMB have insufficient diagnosis and require hysteroscopy.   - Embx done today  - TVUS ordered  - discussed if embx insufficient may need hscope D&C. Discussed procedure in detail  - all questions answered    Carie Dwyer M.D.  Obstetrics and Gynecology

## 2022-12-14 NOTE — PROCEDURES
Endometrial biopsy    Date/Time: 12/14/2022 9:20 AM  Performed by: Carie Dwyer MD  Authorized by: Carie Dwyer MD     Consent:     Consent obtained:  Written    Consent given by:  Patient    Patient questions answered: yes      Patient agrees, verbalizes understanding, and wants to proceed: yes      Educational handouts given: yes      Instructions and paperwork completed: yes    Indication:     Indications: Post-menopausal bleeding      Chronicity of post-menopausal bleeding:  New    Progression of post-menopausal bleeding:  Unchanged  Pre-procedure:     Pre-procedure timeout performed: yes    Procedure:     Procedure: endometrial biopsy with Pipelle      Cervix cleaned and prepped: yes      Uterus sounded: yes      Uterus sound depth (cm):  8    Curettes used:  2    Specimen collected: specimen collected and sent to pathology      Patient tolerated procedure well with no complications: yes

## 2022-12-14 NOTE — PROGRESS NOTES
"History & Physical  Gynecology      SUBJECTIVE:     Chief Complaint: Establish Care and PMB       History of Present Illness:    67 y.o. here today for 1 week hx of PMB. States it is dark brown and "clotty". No hx of PMB. No hx of abnormal paps. No FH of uterine cancer. Not sexually active. Is using estradiol vaginal cream and has for about 1 year.     Review of patient's allergies indicates:   Allergen Reactions    Percocet [oxycodone-acetaminophen] Other (See Comments)     Headaches    Penicillins Rash       Past Medical History:   Diagnosis Date    Asthma     Hypertension     Hypotension, iatrogenic     Osteopenia     Respiratory distress     Thyroid disease      Past Surgical History:   Procedure Laterality Date    AUGMENTATION OF BREAST Bilateral     removal    BREAST RECONSTRUCTION      COLONOSCOPY N/A 3/17/2022    Procedure: COLONOSCOPY;  Surgeon: Alessandra Springer MD;  Location: [facility];  Service: General;  Laterality: N/A;    HIP REPLACEMENT ARTHROPLASTY Right      OB History          2    Para   2    Term   2            AB        Living             SAB        IAB        Ectopic        Multiple        Live Births                   Family History   Problem Relation Age of Onset    Diabetes Mother     Stroke Mother     Heart attack Mother     Thyroid disease Mother     Heart attack Father     Breast cancer Neg Hx     Ovarian cancer Neg Hx      Social History     Tobacco Use    Smoking status: Never    Smokeless tobacco: Never   Substance Use Topics    Alcohol use: Yes     Comment: socially    Drug use: Never       Current Outpatient Medications   Medication Sig    albuterol (PROVENTIL/VENTOLIN HFA) 90 mcg/actuation inhaler 2 puffs every 4 hours as needed for cough, wheeze, or shortness of breath    amLODIPine (NORVASC) 5 MG tablet Take 1 tablet (5 mg total) by mouth once daily.    estradioL (ESTRACE) 0.01 % (0.1 mg/gram) vaginal cream Place 1 g vaginally twice a week.    fluticasone-salmeterol " 230-21 mcg/dose (ADVAIR HFA) 230-21 mcg/actuation HFAA inhaler Inhale 2 puffs into the lungs 2 (two) times daily. Controller    HYDROcodone-acetaminophen (NORCO) 5-325 mg per tablet Take 1 tablet by mouth every 6 (six) hours as needed for Pain. (Patient not taking: Reported on 11/16/2022)    levothyroxine (SYNTHROID) 75 MCG tablet Take 1 tablet (75 mcg total) by mouth once daily.    metoprolol succinate (TOPROL-XL) 50 MG 24 hr tablet Take 1 tablet (50 mg total) by mouth once daily.    ondansetron (ZOFRAN) 4 MG tablet Take 1 tablet (4 mg total) by mouth every 6 (six) hours as needed for Nausea (vomiting).    rosuvastatin (CRESTOR) 10 MG tablet Take 1 tablet (10 mg total) by mouth once daily.    traZODone (DESYREL) 50 MG tablet Take 1 tablet (50 mg total) by mouth every evening.     No current facility-administered medications for this visit.         Review of Systems:  Review of Systems   Constitutional:  Negative for chills, fatigue and fever.   HENT:  Negative for congestion.    Eyes:  Negative for visual disturbance.   Respiratory:  Negative for cough and shortness of breath.    Cardiovascular:  Negative for chest pain and palpitations.   Gastrointestinal:  Negative for abdominal distention, abdominal pain, constipation, diarrhea, nausea and vomiting.   Genitourinary:  Negative for difficulty urinating, dysuria, hematuria, vaginal bleeding and vaginal discharge.   Skin:  Negative for rash.   Neurological:  Negative for dizziness, seizures, light-headedness and headaches.   Hematological:  Does not bruise/bleed easily.   Psychiatric/Behavioral:  Negative for dysphoric mood. The patient is not nervous/anxious.       OBJECTIVE:     Physical Exam:  Physical Exam  Vitals reviewed.   Constitutional:       General: She is not in acute distress.     Appearance: Normal appearance. She is well-developed.   HENT:      Head: Normocephalic and atraumatic.   Cardiovascular:      Rate and Rhythm: Normal rate and regular rhythm.    Pulmonary:      Effort: Pulmonary effort is normal.   Abdominal:      General: There is no distension.      Palpations: Abdomen is soft.   Genitourinary:     Vagina: Normal.      Comments: Normal external female genitalia, normal hair distribution. Vaginal mucosa pink, moist, well rugated, scant white physiologic discharge. Scant old blood in vault. Cervix pink, non-friable, without lesion. No CMT. Uterus non tender, mobile, not enlarged. Adnexa without fullness or tenderness.    Skin:     General: Skin is warm.   Neurological:      Mental Status: She is alert and oriented to person, place, and time.   Psychiatric:         Behavior: Behavior normal.         Thought Content: Thought content normal.         Judgment: Judgment normal.         ASSESSMENT:       ICD-10-CM ICD-9-CM    1. Post-menopausal bleeding  N95.0 627.1 Liquid-Based Pap Smear, Screening      HPV High Risk Genotypes, PCR      US Pelvis Comp with Transvag NON-OB (xpd      Specimen to Pathology, Ob/Gyn          Orders Placed This Encounter   Procedures    HPV High Risk Genotypes, PCR     Order Specific Question:   Source     Answer:   Cervix     Order Specific Question:   Release to patient     Answer:   Immediate    US Pelvis Comp with Transvag NON-OB (xpd     Standing Status:   Future     Standing Expiration Date:   12/14/2023     Order Specific Question:   May the Radiologist modify the order per protocol to meet the clinical needs of the patient?     Answer:   Yes     Order Specific Question:   Release to patient     Answer:   Immediate           Plan:      PMB:  - Discussed most common cause of PMB is uterine atrophy. Discussed differential dx including atrophy vs polyp, vs EIN or cancer. Discussed role of TVUS vs EMBx. EMS of 4 mm or less on TVUS has >99% NPV for endometrial cancer. Ongoing bleeding still requires tissue sample even if EMS is normal.  EMBx is reasonable first line choice or can be performed if bleeding persists. 10% of women  with PMB have insufficient diagnosis and require hysteroscopy.   - Embx done today  - TVUS ordered  - discussed if embx insufficient may need hscope D&C. Discussed procedure in detail  - all questions answered    Carie Dwyer M.D.  Obstetrics and Gynecology

## 2022-12-16 ENCOUNTER — TELEPHONE (OUTPATIENT)
Dept: OBSTETRICS AND GYNECOLOGY | Facility: CLINIC | Age: 67
End: 2022-12-16
Payer: MEDICARE

## 2022-12-16 ENCOUNTER — HOSPITAL ENCOUNTER (OUTPATIENT)
Dept: RADIOLOGY | Facility: HOSPITAL | Age: 67
Discharge: HOME | End: 2022-12-16
Attending: STUDENT IN AN ORGANIZED HEALTH CARE EDUCATION/TRAINING PROGRAM
Payer: MEDICARE

## 2022-12-16 DIAGNOSIS — N95.0 POST-MENOPAUSAL BLEEDING: ICD-10-CM

## 2022-12-16 PROCEDURE — 76830 US PELVIS COMP WITH TRANSVAG NON-OB (XPD): ICD-10-PCS | Mod: 26,,, | Performed by: RADIOLOGY

## 2022-12-16 PROCEDURE — 76830 TRANSVAGINAL US NON-OB: CPT | Mod: 26,,, | Performed by: RADIOLOGY

## 2022-12-16 PROCEDURE — 76856 US EXAM PELVIC COMPLETE: CPT | Mod: 26,,, | Performed by: RADIOLOGY

## 2022-12-16 PROCEDURE — 76856 US PELVIS COMP WITH TRANSVAG NON-OB (XPD): ICD-10-PCS | Mod: 26,,, | Performed by: RADIOLOGY

## 2022-12-16 PROCEDURE — 76830 TRANSVAGINAL US NON-OB: CPT | Mod: TC

## 2022-12-16 NOTE — TELEPHONE ENCOUNTER
----- Message from Alice Gil sent at 12/16/2022  2:56 PM CST -----  Contact: Pt  Type:  Sooner Apoointment Request    Caller is requesting a sooner appointment.  Caller declined first available appointment listed below.  Caller will not accept being placed on the waitlist and is requesting a message be sent to doctor.  Name of Caller:Pt  When is the first available appointment?02/13  Symptoms:Abnormal ultrasound results, need a follow up with Dr. Yuliya BELTRAN  Would the patient rather a call back or a response via [facility]? Call  Best Call Back Number:[phone]    Additional Information: She doesn't have a voicemail set up, please call or text or use [facility]

## 2022-12-19 ENCOUNTER — OFFICE VISIT (OUTPATIENT)
Dept: OBSTETRICS AND GYNECOLOGY | Facility: CLINIC | Age: 67
End: 2022-12-19
Payer: MEDICARE

## 2022-12-19 VITALS
BODY MASS INDEX: 20.82 KG/M2 | SYSTOLIC BLOOD PRESSURE: 142 MMHG | RESPIRATION RATE: 16 BRPM | DIASTOLIC BLOOD PRESSURE: 86 MMHG | WEIGHT: 121.94 LBS | HEIGHT: 64 IN

## 2022-12-19 DIAGNOSIS — N95.0 POST-MENOPAUSAL BLEEDING: Primary | ICD-10-CM

## 2022-12-19 PROCEDURE — 99999 PR PBB SHADOW E&M-EST. PATIENT-LVL III: ICD-10-PCS | Mod: PBBFAC,,, | Performed by: STUDENT IN AN ORGANIZED HEALTH CARE EDUCATION/TRAINING PROGRAM

## 2022-12-19 PROCEDURE — 99213 OFFICE O/P EST LOW 20 MIN: CPT | Mod: PBBFAC,PN | Performed by: STUDENT IN AN ORGANIZED HEALTH CARE EDUCATION/TRAINING PROGRAM

## 2022-12-19 PROCEDURE — 99213 OFFICE O/P EST LOW 20 MIN: CPT | Mod: S$PBB,,, | Performed by: STUDENT IN AN ORGANIZED HEALTH CARE EDUCATION/TRAINING PROGRAM

## 2022-12-19 PROCEDURE — 99999 PR PBB SHADOW E&M-EST. PATIENT-LVL III: CPT | Mod: PBBFAC,,, | Performed by: STUDENT IN AN ORGANIZED HEALTH CARE EDUCATION/TRAINING PROGRAM

## 2022-12-19 PROCEDURE — 99213 PR OFFICE/OUTPT VISIT, EST, LEVL III, 20-29 MIN: ICD-10-PCS | Mod: S$PBB,,, | Performed by: STUDENT IN AN ORGANIZED HEALTH CARE EDUCATION/TRAINING PROGRAM

## 2022-12-19 RX ORDER — SODIUM CHLORIDE 9 MG/ML
INJECTION, SOLUTION INTRAVENOUS CONTINUOUS
Status: CANCELLED | OUTPATIENT
Start: 2022-12-19

## 2022-12-19 NOTE — PROGRESS NOTES
History & Physical  Gynecology      SUBJECTIVE:     Chief Complaint: Discuss gyn u/s       History of Present Illness:    Here today to f/u US results. Still having spotting. Cramping improved from biopsy. Update patient IS sexually active.     TVUS:    Narrative & Impression  EXAMINATION:  US PELVIS COMP WITH TRANSVAG NON-OB (XPD)     CLINICAL HISTORY:  Postmenopausal bleeding     TECHNIQUE:  Transabdominal sonography of the pelvis was performed, followed by transvaginal sonography to better evaluate the uterus and ovaries.     COMPARISON:  None.     FINDINGS:  Uterus is atrophic measuring 4.8 x 2.2 x 3.5 cm.  Endometrial complex is thickened measuring 7 mm with a small amount of fluid distending the endometrium.  Suspected small isoechoic mass of cervix measuring 1.1 x 1.2 cm.  There is a coarse 5 mm calcification of the uterus.  Small nabothian cysts of the cervix.     The ovaries are not identified obscured by overlying bowel gas.  No suspicious adnexal mass.  No free fluid in cul-de-sac.     Impression:     1. Uterine atrophy with thickening of the endometrial complex and fluid distending the endometrial canal.  Endometrial thickening in a postmenopausal patient can be secondary to endometrial hyperplasia, endometrial polyps as well as endometrial neoplasia.  Further evaluation is warranted.  2. Suspected small isoechoic mass of the cervix.  Neoplasia not excludable.  Further evaluation is warranted.  3. Small nabothian cysts.  4. Nonvisualization of the ovaries.  This report was flagged in Epic as abnormal.    Review of patient's allergies indicates:   Allergen Reactions    Percocet [oxycodone-acetaminophen] Other (See Comments)     Headaches    Penicillins Rash       Past Medical History:   Diagnosis Date    Asthma     Hypertension     Hypotension, iatrogenic     Osteopenia     Respiratory distress     Thyroid disease      Past Surgical History:   Procedure Laterality Date    AUGMENTATION OF BREAST Bilateral      removal    BREAST RECONSTRUCTION      COLONOSCOPY N/A 3/17/2022    Procedure: COLONOSCOPY;  Surgeon: Alessandra Springer MD;  Location: [facility];  Service: General;  Laterality: N/A;    HIP REPLACEMENT ARTHROPLASTY Right      OB History          2    Para   2    Term   2            AB        Living             SAB        IAB        Ectopic        Multiple        Live Births                   Family History   Problem Relation Age of Onset    Diabetes Mother     Stroke Mother     Heart attack Mother     Thyroid disease Mother     Heart attack Father     Breast cancer Neg Hx     Ovarian cancer Neg Hx      Social History     Tobacco Use    Smoking status: Never    Smokeless tobacco: Never   Substance Use Topics    Alcohol use: Yes     Comment: socially    Drug use: Never       Current Outpatient Medications   Medication Sig    albuterol (PROVENTIL/VENTOLIN HFA) 90 mcg/actuation inhaler 2 puffs every 4 hours as needed for cough, wheeze, or shortness of breath    amLODIPine (NORVASC) 5 MG tablet Take 1 tablet (5 mg total) by mouth once daily.    estradioL (ESTRACE) 0.01 % (0.1 mg/gram) vaginal cream Place 1 g vaginally twice a week.    fluticasone-salmeterol 230-21 mcg/dose (ADVAIR HFA) 230-21 mcg/actuation HFAA inhaler Inhale 2 puffs into the lungs 2 (two) times daily. Controller    HYDROcodone-acetaminophen (NORCO) 5-325 mg per tablet Take 1 tablet by mouth every 6 (six) hours as needed for Pain. (Patient not taking: Reported on 2022)    levothyroxine (SYNTHROID) 75 MCG tablet Take 1 tablet (75 mcg total) by mouth once daily.    metoprolol succinate (TOPROL-XL) 50 MG 24 hr tablet Take 1 tablet (50 mg total) by mouth once daily.    ondansetron (ZOFRAN) 4 MG tablet Take 1 tablet (4 mg total) by mouth every 6 (six) hours as needed for Nausea (vomiting).    rosuvastatin (CRESTOR) 10 MG tablet Take 1 tablet (10 mg total) by mouth once daily.    traZODone (DESYREL) 50 MG tablet Take 1 tablet (50 mg total)  by mouth every evening.     No current facility-administered medications for this visit.         Review of Systems:  Review of Systems   Constitutional:  Negative for chills, fatigue and fever.   HENT:  Negative for congestion.    Eyes:  Negative for visual disturbance.   Respiratory:  Negative for cough and shortness of breath.    Cardiovascular:  Negative for chest pain and palpitations.   Gastrointestinal:  Negative for abdominal distention, abdominal pain, constipation, diarrhea, nausea and vomiting.   Genitourinary:  Negative for difficulty urinating, dysuria, hematuria, vaginal bleeding and vaginal discharge.   Skin:  Negative for rash.   Neurological:  Negative for dizziness, seizures, light-headedness and headaches.   Hematological:  Does not bruise/bleed easily.   Psychiatric/Behavioral:  Negative for dysphoric mood. The patient is not nervous/anxious.       OBJECTIVE:     Physical Exam:  Physical Exam  Vitals reviewed.   Constitutional:       General: She is not in acute distress.     Appearance: Normal appearance. She is well-developed.   HENT:      Head: Normocephalic and atraumatic.   Cardiovascular:      Rate and Rhythm: Normal rate and regular rhythm.   Pulmonary:      Effort: Pulmonary effort is normal.   Abdominal:      General: There is no distension.      Palpations: Abdomen is soft.   Genitourinary:     Vagina: Normal.   Skin:     General: Skin is warm.   Neurological:      Mental Status: She is alert and oriented to person, place, and time.   Psychiatric:         Behavior: Behavior normal.         Thought Content: Thought content normal.         Judgment: Judgment normal.         ASSESSMENT:       ICD-10-CM ICD-9-CM    1. Post-menopausal bleeding  N95.0 627.1           No orders of the defined types were placed in this encounter.          Plan:      - pap and embx not back yet  - discussed based off of US findings, recommend diagnostic and therapeutic hscope D&C  - R/B/A of hysteroscopy/D&C  reviewed in detail. The risk of infection was discussed. Small risk of cervical laceration or uterine perforation was discussed as well as the small risk of the need for diagnostic laparoscopy. The risk of bleeding was discussed in the event of a uterine perforation as well as the possible need for blood transfusion. Patient voices understanding and desires to proceed.    - will book hscope D&C    Carie Dwyer M.D.  Obstetrics and Gynecology

## 2022-12-21 LAB
FINAL PATHOLOGIC DIAGNOSIS: NORMAL
Lab: NORMAL

## 2022-12-24 LAB
FINAL PATHOLOGIC DIAGNOSIS: NORMAL
GROSS: NORMAL
Lab: NORMAL

## 2022-12-28 LAB
HPV HR 12 DNA SPEC QL NAA+PROBE: NEGATIVE
HPV16 AG SPEC QL: NEGATIVE
HPV18 DNA SPEC QL NAA+PROBE: NEGATIVE

## 2023-01-13 ENCOUNTER — HOSPITAL ENCOUNTER (OUTPATIENT)
Facility: AMBULARY SURGERY CENTER | Age: 68
Discharge: HOME | End: 2023-01-13
Attending: ANESTHESIOLOGY | Admitting: ANESTHESIOLOGY
Payer: MEDICARE

## 2023-01-13 DIAGNOSIS — M54.12 CERVICAL RADICULITIS: ICD-10-CM

## 2023-01-13 PROCEDURE — 62321 PR INJ CERV/THORAC, W/GUIDANCE: ICD-10-PCS | Mod: ,,, | Performed by: ANESTHESIOLOGY

## 2023-01-13 PROCEDURE — 62321 NJX INTERLAMINAR CRV/THRC: CPT | Performed by: ANESTHESIOLOGY

## 2023-01-13 PROCEDURE — 62321 NJX INTERLAMINAR CRV/THRC: CPT | Mod: ,,, | Performed by: ANESTHESIOLOGY

## 2023-01-13 RX ORDER — MIDAZOLAM HYDROCHLORIDE 1 MG/ML
INJECTION INTRAMUSCULAR; INTRAVENOUS
Status: DISCONTINUED | OUTPATIENT
Start: 2023-01-13 | End: 2023-01-13 | Stop reason: HOSPADM

## 2023-01-13 RX ORDER — LIDOCAINE HYDROCHLORIDE 10 MG/ML
INJECTION, SOLUTION EPIDURAL; INFILTRATION; INTRACAUDAL; PERINEURAL
Status: DISCONTINUED | OUTPATIENT
Start: 2023-01-13 | End: 2023-01-13 | Stop reason: HOSPADM

## 2023-01-13 RX ORDER — SODIUM CHLORIDE 9 MG/ML
INJECTION, SOLUTION INTRAMUSCULAR; INTRAVENOUS; SUBCUTANEOUS
Status: DISCONTINUED | OUTPATIENT
Start: 2023-01-13 | End: 2023-01-13 | Stop reason: HOSPADM

## 2023-01-13 RX ORDER — DEXAMETHASONE SODIUM PHOSPHATE 10 MG/ML
INJECTION INTRAMUSCULAR; INTRAVENOUS
Status: DISCONTINUED | OUTPATIENT
Start: 2023-01-13 | End: 2023-01-13 | Stop reason: HOSPADM

## 2023-01-13 RX ORDER — FENTANYL CITRATE 50 UG/ML
INJECTION, SOLUTION INTRAMUSCULAR; INTRAVENOUS
Status: DISCONTINUED | OUTPATIENT
Start: 2023-01-13 | End: 2023-01-13 | Stop reason: HOSPADM

## 2023-01-13 RX ORDER — SODIUM CHLORIDE, SODIUM LACTATE, POTASSIUM CHLORIDE, CALCIUM CHLORIDE 600; 310; 30; 20 MG/100ML; MG/100ML; MG/100ML; MG/100ML
INJECTION, SOLUTION INTRAVENOUS CONTINUOUS
Status: DISCONTINUED | OUTPATIENT
Start: 2023-01-13 | End: 2023-01-13 | Stop reason: HOSPADM

## 2023-01-13 RX ADMIN — SODIUM CHLORIDE, SODIUM LACTATE, POTASSIUM CHLORIDE, CALCIUM CHLORIDE: 600; 310; 30; 20 INJECTION, SOLUTION INTRAVENOUS at 09:01

## 2023-01-13 NOTE — OP NOTE
PROCEDURE DATE: 1/13/2023    Procedure: C7-T1 cervical interlaminar epidural steroid injection under utilizing fluoroscopy.    Diagnosis: Cervical Degenerative Disc Diease; Cervical Radiculitis  POSTOP DIAGNOSIS: SAME    Physician: Larry Thomason MD    Medications injected:  Dexamethasone 10mg followed by a slow injection of 4 mL sterile, preservative-free normal saline.    Local anesthetic used: Lidocaine 1%, 2 ml.    Sedation Medications: RN IV Sedation    Complications:  None    Estimated blood loss: None    Technique:  A time-out was taken to identify patient and procedure prior to starting the procedure.  With the patient laying in a prone position with the neck in a mid-flexed forward position, the area was prepped and draped in the usual sterile fashion using ChloraPrep and a fenestrated drape.  The area was determined under AP fluoroscopic guidance.  Local anesthetic was given using a 25-gauge 1.5 inch needle by raising a wheal and then infiltrating ventrally.  A 3.5 inch 20-gauge Touhy needle was introduced under fluoroscopic guidance to meet the lamina of C7.  The needle was then hinged under the lamina then advanced using loss of resistance technique.  Once the tip of the needle was in the desired position, the 1ml contrast dye Omnipaque was injected to determine placement and no uptake.  The steroid was then injected slowly followed by a slow injection of 4 mL of the sterile preservative-free normal saline.  The patient tolerated the procedure well.    The patient was monitored after the procedure and was given post-procedure and discharge instructions to follow at home. The patient was discharged in a stable condition.

## 2023-01-13 NOTE — DISCHARGE SUMMARY
[facility]  Discharge Note  Short Stay    Procedure(s) (LRB):  Injection-steroid-epidural-cervical (N/A)      OUTCOME: Patient tolerated treatment/procedure well without complication and is now ready for discharge.    DISPOSITION: Home or Self Care    FINAL DIAGNOSIS:  <principal problem not specified>    FOLLOWUP: In clinic    DISCHARGE INSTRUCTIONS:    Discharge Procedure Orders   Notify your health care provider if you experience any of the following:  temperature >100.4     Notify your health care provider if you experience any of the following:  severe uncontrolled pain     Notify your health care provider if you experience any of the following:  redness, tenderness, or signs of infection (pain, swelling, redness, odor or green/yellow discharge around incision site)     Activity as tolerated        TIME SPENT ON DISCHARGE:   30 minutes

## 2023-01-19 VITALS
HEART RATE: 59 BPM | DIASTOLIC BLOOD PRESSURE: 68 MMHG | OXYGEN SATURATION: 98 % | TEMPERATURE: 97 F | RESPIRATION RATE: 16 BRPM | BODY MASS INDEX: 20.93 KG/M2 | SYSTOLIC BLOOD PRESSURE: 129 MMHG | WEIGHT: 121.94 LBS

## 2023-01-24 ENCOUNTER — OFFICE VISIT (OUTPATIENT)
Dept: OBSTETRICS AND GYNECOLOGY | Facility: CLINIC | Age: 68
End: 2023-01-24
Payer: MEDICARE

## 2023-01-24 VITALS
SYSTOLIC BLOOD PRESSURE: 136 MMHG | BODY MASS INDEX: 21.34 KG/M2 | DIASTOLIC BLOOD PRESSURE: 80 MMHG | RESPIRATION RATE: 16 BRPM | HEIGHT: 64 IN | WEIGHT: 125 LBS

## 2023-01-24 DIAGNOSIS — R30.0 DYSURIA: ICD-10-CM

## 2023-01-24 DIAGNOSIS — Z01.818 PREOP EXAMINATION: ICD-10-CM

## 2023-01-24 DIAGNOSIS — N95.0 POST-MENOPAUSAL BLEEDING: Primary | ICD-10-CM

## 2023-01-24 PROCEDURE — 99999 PR PBB SHADOW E&M-EST. PATIENT-LVL III: ICD-10-PCS | Mod: PBBFAC,,, | Performed by: STUDENT IN AN ORGANIZED HEALTH CARE EDUCATION/TRAINING PROGRAM

## 2023-01-24 PROCEDURE — 99499 NO LOS: ICD-10-PCS | Mod: S$PBB,,, | Performed by: STUDENT IN AN ORGANIZED HEALTH CARE EDUCATION/TRAINING PROGRAM

## 2023-01-24 PROCEDURE — 99999 PR PBB SHADOW E&M-EST. PATIENT-LVL III: CPT | Mod: PBBFAC,,, | Performed by: STUDENT IN AN ORGANIZED HEALTH CARE EDUCATION/TRAINING PROGRAM

## 2023-01-24 PROCEDURE — 99499 UNLISTED E&M SERVICE: CPT | Mod: S$PBB,,, | Performed by: STUDENT IN AN ORGANIZED HEALTH CARE EDUCATION/TRAINING PROGRAM

## 2023-01-24 PROCEDURE — 87086 URINE CULTURE/COLONY COUNT: CPT | Performed by: STUDENT IN AN ORGANIZED HEALTH CARE EDUCATION/TRAINING PROGRAM

## 2023-01-24 PROCEDURE — 99213 OFFICE O/P EST LOW 20 MIN: CPT | Mod: PBBFAC,PN | Performed by: STUDENT IN AN ORGANIZED HEALTH CARE EDUCATION/TRAINING PROGRAM

## 2023-01-24 RX ORDER — IBUPROFEN 600 MG/1
600 TABLET ORAL EVERY 6 HOURS PRN
Qty: 60 TABLET | Refills: 0 | Status: SHIPPED | OUTPATIENT
Start: 2023-01-24 | End: 2023-04-19 | Stop reason: SDUPTHER

## 2023-01-24 RX ORDER — SULFAMETHOXAZOLE AND TRIMETHOPRIM 800; 160 MG/1; MG/1
1 TABLET ORAL 2 TIMES DAILY
Qty: 6 TABLET | Refills: 0 | Status: SHIPPED | OUTPATIENT
Start: 2023-01-24 | End: 2023-01-27

## 2023-01-24 NOTE — PROGRESS NOTES
History & Physical  Gynecology      SUBJECTIVE:     Chief Complaint: Pre-op Exam       History of Present Illness:    Here today for preop visit for hscope D&C. No further bleeding since biopsy. Feels like may be getting a UTI.    TVUS:    Narrative & Impression  EXAMINATION:  US PELVIS COMP WITH TRANSVAG NON-OB (XPD)     CLINICAL HISTORY:  Postmenopausal bleeding     TECHNIQUE:  Transabdominal sonography of the pelvis was performed, followed by transvaginal sonography to better evaluate the uterus and ovaries.     COMPARISON:  None.     FINDINGS:  Uterus is atrophic measuring 4.8 x 2.2 x 3.5 cm.  Endometrial complex is thickened measuring 7 mm with a small amount of fluid distending the endometrium.  Suspected small isoechoic mass of cervix measuring 1.1 x 1.2 cm.  There is a coarse 5 mm calcification of the uterus.  Small nabothian cysts of the cervix.     The ovaries are not identified obscured by overlying bowel gas.  No suspicious adnexal mass.  No free fluid in cul-de-sac.     Impression:     1. Uterine atrophy with thickening of the endometrial complex and fluid distending the endometrial canal.  Endometrial thickening in a postmenopausal patient can be secondary to endometrial hyperplasia, endometrial polyps as well as endometrial neoplasia.  Further evaluation is warranted.  2. Suspected small isoechoic mass of the cervix.  Neoplasia not excludable.  Further evaluation is warranted.  3. Small nabothian cysts.  4. Nonvisualization of the ovaries.  This report was flagged in Epic as abnormal.      Embx:  ENDOMETRIAL BIOPSY:  Hyperplastic endometrial polyp without atypia         Review of patient's allergies indicates:   Allergen Reactions    Percocet [oxycodone-acetaminophen] Other (See Comments)     Headaches    Penicillins Rash       Past Medical History:   Diagnosis Date    Arthritis     Asthma     H/O: pneumonia     Hypertension     Hypotension, iatrogenic     Osteopenia     Post-menopausal bleeding  2023    Respiratory distress     Thyroid disease      Past Surgical History:   Procedure Laterality Date    AUGMENTATION OF BREAST Bilateral     removal    BREAST RECONSTRUCTION      COLONOSCOPY N/A 3/17/2022    Procedure: COLONOSCOPY;  Surgeon: Alessandra Springer MD;  Location: [facility];  Service: General;  Laterality: N/A;    EPIDURAL STEROID INJECTION INTO CERVICAL SPINE N/A 2023    Procedure: Injection-steroid-epidural-cervical;  Surgeon: Larry Thomason MD;  Location: [facility] OR;  Service: Pain Management;  Laterality: N/A;  C7-T1    HIP REPLACEMENT ARTHROPLASTY Right      OB History          2    Para   2    Term   2            AB        Living             SAB        IAB        Ectopic        Multiple        Live Births                   Family History   Problem Relation Age of Onset    Diabetes Mother     Stroke Mother     Heart attack Mother     Thyroid disease Mother     Heart attack Father     Breast cancer Neg Hx     Ovarian cancer Neg Hx      Social History     Tobacco Use    Smoking status: Never    Smokeless tobacco: Never   Substance Use Topics    Alcohol use: Yes     Comment: socially    Drug use: Never       Current Outpatient Medications   Medication Sig    albuterol (PROVENTIL/VENTOLIN HFA) 90 mcg/actuation inhaler 2 puffs every 4 hours as needed for cough, wheeze, or shortness of breath    amLODIPine (NORVASC) 5 MG tablet Take 1 tablet (5 mg total) by mouth once daily.    estradioL (ESTRACE) 0.01 % (0.1 mg/gram) vaginal cream Place 1 g vaginally twice a week.    fluticasone-salmeterol 230-21 mcg/dose (ADVAIR HFA) 230-21 mcg/actuation HFAA inhaler Inhale 2 puffs into the lungs 2 (two) times daily. Controller (Patient taking differently: Inhale 2 puffs into the lungs 2 (two) times daily as needed. Controller)    ibuprofen (ADVIL,MOTRIN) 600 MG tablet Take 1 tablet (600 mg total) by mouth every 6 (six) hours as needed for Pain. (Patient not taking: Reported on 2023)     levothyroxine (SYNTHROID) 75 MCG tablet Take 1 tablet (75 mcg total) by mouth once daily.    metoprolol succinate (TOPROL-XL) 50 MG 24 hr tablet Take 1 tablet (50 mg total) by mouth once daily.    rosuvastatin (CRESTOR) 10 MG tablet Take 1 tablet (10 mg total) by mouth once daily.    sulfamethoxazole-trimethoprim 800-160mg (BACTRIM DS) 800-160 mg Tab Take 1 tablet by mouth 2 (two) times daily. for 3 days (Patient not taking: Reported on 1/24/2023)    traZODone (DESYREL) 50 MG tablet Take 1 tablet (50 mg total) by mouth every evening.     No current facility-administered medications for this visit.         Review of Systems:  Review of Systems   Constitutional:  Negative for chills, fatigue and fever.   HENT:  Negative for congestion.    Eyes:  Negative for visual disturbance.   Respiratory:  Negative for cough and shortness of breath.    Cardiovascular:  Negative for chest pain and palpitations.   Gastrointestinal:  Negative for abdominal distention, abdominal pain, constipation, diarrhea, nausea and vomiting.   Genitourinary:  Negative for difficulty urinating, dysuria, hematuria, vaginal bleeding and vaginal discharge.   Skin:  Negative for rash.   Neurological:  Negative for dizziness, seizures, light-headedness and headaches.   Hematological:  Does not bruise/bleed easily.   Psychiatric/Behavioral:  Negative for dysphoric mood. The patient is not nervous/anxious.       OBJECTIVE:     Physical Exam:  Physical Exam  Vitals reviewed.   Constitutional:       General: She is not in acute distress.     Appearance: Normal appearance. She is well-developed.   HENT:      Head: Normocephalic and atraumatic.   Cardiovascular:      Rate and Rhythm: Normal rate and regular rhythm.   Pulmonary:      Effort: Pulmonary effort is normal.   Abdominal:      General: There is no distension.      Palpations: Abdomen is soft.   Genitourinary:     Vagina: Normal.   Skin:     General: Skin is warm.   Neurological:      Mental Status:  She is alert and oriented to person, place, and time.   Psychiatric:         Behavior: Behavior normal.         Thought Content: Thought content normal.         Judgment: Judgment normal.         ASSESSMENT:       ICD-10-CM ICD-9-CM    1. Post-menopausal bleeding  N95.0 627.1 EKG 12-lead      2. Preop examination  Z01.818 V72.84 EKG 12-lead      3. Dysuria  R30.0 788.1 Urine culture          Orders Placed This Encounter   Procedures    Urine culture    EKG 12-lead     Standing Status:   Future     Standing Expiration Date:   1/24/2024           Plan:      Preop:  Patient is to have  hscopeD&C  for abnormal uterine bleeding on (1/30/23)    - Labs: not obtained  - CXR/EKG: ordered, but not yet obtained  - PAP: Normal  - TVUS as above  - EMBX as above  - Wet prep - n/a  - Medical clearance required: No  - Case request placed & pre-op orders completed  - Anticoagulation : Patient is NOT on antiocoagulation.   - I have discussed the risks, benefits, indications, and alternatives of the procedure in detail.  The patient verbalizes her understanding.  All questions answered.  Consents signed.  The patient agrees to proceed to proceed as planned.  - To pre-op  - R/B/A of hysteroscopy/D&C reviewed in detail. The risk of infection was discussed. Small risk of cervical laceration or uterine perforation was discussed as well as the small risk of the need for diagnostic laparoscopy. The risk of bleeding was discussed in the event of a uterine perforation as well as the possible need for blood transfusion. Patient voices understanding and desires to proceed.    - Meds sent   - urine culture and bactrim sent  Carie Dwyer M.D.  Obstetrics and Gynecology

## 2023-01-25 LAB — BACTERIA UR CULT: NO GROWTH

## 2023-01-30 PROBLEM — J18.9 PNEUMONIA OF BOTH LUNGS DUE TO INFECTIOUS ORGANISM: Status: RESOLVED | Noted: 2022-10-25 | Resolved: 2023-01-30

## 2023-01-30 PROBLEM — Z98.890 STATUS POST HYSTEROSCOPY: Status: ACTIVE | Noted: 2023-01-30

## 2023-02-06 ENCOUNTER — OFFICE VISIT (OUTPATIENT)
Dept: FAMILY MEDICINE | Facility: CLINIC | Age: 68
End: 2023-02-06
Payer: MEDICARE

## 2023-02-06 ENCOUNTER — LAB VISIT (OUTPATIENT)
Dept: LAB | Facility: HOSPITAL | Age: 68
End: 2023-02-06
Attending: FAMILY MEDICINE
Payer: MEDICARE

## 2023-02-06 VITALS
DIASTOLIC BLOOD PRESSURE: 84 MMHG | RESPIRATION RATE: 15 BRPM | SYSTOLIC BLOOD PRESSURE: 130 MMHG | HEART RATE: 63 BPM | OXYGEN SATURATION: 100 % | WEIGHT: 123.25 LBS | HEIGHT: 65 IN | BODY MASS INDEX: 20.54 KG/M2

## 2023-02-06 DIAGNOSIS — I10 ESSENTIAL HYPERTENSION: ICD-10-CM

## 2023-02-06 DIAGNOSIS — M46.02 SPINAL ENTHESOPATHY OF CERVICAL REGION: ICD-10-CM

## 2023-02-06 DIAGNOSIS — R42 DIZZINESS: ICD-10-CM

## 2023-02-06 DIAGNOSIS — I70.0 AORTIC ATHEROSCLEROSIS: ICD-10-CM

## 2023-02-06 DIAGNOSIS — Z79.899 HIGH RISK MEDICATION USE: ICD-10-CM

## 2023-02-06 DIAGNOSIS — E03.9 ACQUIRED HYPOTHYROIDISM: ICD-10-CM

## 2023-02-06 DIAGNOSIS — R53.82 CHRONIC FATIGUE: ICD-10-CM

## 2023-02-06 DIAGNOSIS — E87.1 HYPONATREMIA: ICD-10-CM

## 2023-02-06 DIAGNOSIS — Z12.31 SCREENING MAMMOGRAM FOR BREAST CANCER: ICD-10-CM

## 2023-02-06 DIAGNOSIS — Z82.49 FAMILY HISTORY OF PREMATURE CAD: ICD-10-CM

## 2023-02-06 DIAGNOSIS — J45.20 MILD INTERMITTENT ASTHMA WITHOUT COMPLICATION: ICD-10-CM

## 2023-02-06 DIAGNOSIS — I10 ESSENTIAL HYPERTENSION: Primary | ICD-10-CM

## 2023-02-06 PROBLEM — D69.2 OTHER NONTHROMBOCYTOPENIC PURPURA: Status: ACTIVE | Noted: 2023-02-06

## 2023-02-06 PROBLEM — D69.2 OTHER NONTHROMBOCYTOPENIC PURPURA: Status: RESOLVED | Noted: 2023-02-06 | Resolved: 2023-02-06

## 2023-02-06 LAB
25(OH)D3+25(OH)D2 SERPL-MCNC: 29 NG/ML (ref 30–96)
ALBUMIN SERPL BCP-MCNC: 3.9 G/DL (ref 3.5–5.2)
ALP SERPL-CCNC: 52 U/L (ref 55–135)
ALT SERPL W/O P-5'-P-CCNC: 29 U/L (ref 10–44)
ANION GAP SERPL CALC-SCNC: 7 MMOL/L (ref 8–16)
AST SERPL-CCNC: 25 U/L (ref 10–40)
BILIRUB SERPL-MCNC: 0.5 MG/DL (ref 0.1–1)
BUN SERPL-MCNC: 11 MG/DL (ref 8–23)
CALCIUM SERPL-MCNC: 9.3 MG/DL (ref 8.7–10.5)
CHLORIDE SERPL-SCNC: 99 MMOL/L (ref 95–110)
CHOLEST SERPL-MCNC: 209 MG/DL (ref 120–199)
CHOLEST/HDLC SERPL: 2.8 {RATIO} (ref 2–5)
CO2 SERPL-SCNC: 26 MMOL/L (ref 23–29)
CREAT SERPL-MCNC: 0.6 MG/DL (ref 0.5–1.4)
EST. GFR  (NO RACE VARIABLE): >60 ML/MIN/1.73 M^2
GLUCOSE SERPL-MCNC: 95 MG/DL (ref 70–110)
HDLC SERPL-MCNC: 75 MG/DL (ref 40–75)
HDLC SERPL: 35.9 % (ref 20–50)
LDLC SERPL CALC-MCNC: 121.2 MG/DL (ref 63–159)
NONHDLC SERPL-MCNC: 134 MG/DL
POTASSIUM SERPL-SCNC: 4.1 MMOL/L (ref 3.5–5.1)
PROT SERPL-MCNC: 6.9 G/DL (ref 6–8.4)
SODIUM SERPL-SCNC: 132 MMOL/L (ref 136–145)
TRIGL SERPL-MCNC: 64 MG/DL (ref 30–150)
TSH SERPL DL<=0.005 MIU/L-ACNC: 0.47 UIU/ML (ref 0.4–4)

## 2023-02-06 PROCEDURE — 84443 ASSAY THYROID STIM HORMONE: CPT | Performed by: FAMILY MEDICINE

## 2023-02-06 PROCEDURE — 99999 PR PBB SHADOW E&M-EST. PATIENT-LVL III: CPT | Mod: PBBFAC,,, | Performed by: FAMILY MEDICINE

## 2023-02-06 PROCEDURE — 80061 LIPID PANEL: CPT | Performed by: FAMILY MEDICINE

## 2023-02-06 PROCEDURE — 82306 VITAMIN D 25 HYDROXY: CPT | Performed by: FAMILY MEDICINE

## 2023-02-06 PROCEDURE — 99999 PR PBB SHADOW E&M-EST. PATIENT-LVL III: ICD-10-PCS | Mod: PBBFAC,,, | Performed by: FAMILY MEDICINE

## 2023-02-06 PROCEDURE — 99214 PR OFFICE/OUTPT VISIT, EST, LEVL IV, 30-39 MIN: ICD-10-PCS | Mod: S$PBB,,, | Performed by: FAMILY MEDICINE

## 2023-02-06 PROCEDURE — 82607 VITAMIN B-12: CPT | Performed by: FAMILY MEDICINE

## 2023-02-06 PROCEDURE — 36415 COLL VENOUS BLD VENIPUNCTURE: CPT | Performed by: FAMILY MEDICINE

## 2023-02-06 PROCEDURE — 99213 OFFICE O/P EST LOW 20 MIN: CPT | Mod: PBBFAC | Performed by: FAMILY MEDICINE

## 2023-02-06 PROCEDURE — 99214 OFFICE O/P EST MOD 30 MIN: CPT | Mod: S$PBB,,, | Performed by: FAMILY MEDICINE

## 2023-02-06 PROCEDURE — 80053 COMPREHEN METABOLIC PANEL: CPT | Performed by: FAMILY MEDICINE

## 2023-02-06 NOTE — PROGRESS NOTES
[facility] - Clinic Note    Subjective      Ms. Jackson is a 67 y.o. female who presents to clinic for a follow up of chronic conditions.     H/o HTN: currently on amlodipine and metoprolol.   BP well controlled today.   Compliant with med.   Strong family history of heart disease and Mis. Stress test a few months ago was normal.      Hypothyroidism: takes levothyroxine.      Reports seasonal asthma. Will occasionally have to use breo. No recent use.   Previous Pulm was Dr. Alejo.      Continues to complain of chronic fatigue and dizziness.   Sodium borderline low. Reports that she eats a lot of salt.         [facility] Edith has a past medical history of Arthritis, Asthma, H/O: pneumonia, Hypertension, Hypotension, iatrogenic, Osteopenia, Post-menopausal bleeding (01/2023), Respiratory distress, and Thyroid disease.   PSX Edith has a past surgical history that includes Hip replacement arthroplasty (Right); Breast reconstruction; Augmentation of breast (Bilateral); Colonoscopy (N/A, 03/17/2022); Epidural steroid injection into cervical spine (N/A, 01/13/2023); Joint replacement; and Hysteroscopy with dilation and curettage of uterus (N/A, 1/30/2023).    Edith's family history includes Diabetes in her mother; Heart attack in her father and mother; Heart disease in her mother; Stroke in her mother; Thyroid disease in her mother.    Edith reports that she has never smoked. She has never used smokeless tobacco. She reports current alcohol use of about 2.0 standard drinks per week. She reports that she does not use drugs.   [facility] Edith is allergic to percocet [oxycodone-acetaminophen] and penicillins.   DAVID Sharma has a current medication list which includes the following prescription(s): albuterol, amlodipine, estradiol, fluticasone-salmeterol 230-21 mcg/dose, ibuprofen, levothyroxine, metoprolol succinate, rosuvastatin, and trazodone.     Review of Systems   Constitutional:  Positive for fatigue.  "Negative for activity change, appetite change, chills and fever.   Eyes:  Negative for visual disturbance.   Respiratory:  Negative for cough and shortness of breath.    Cardiovascular:  Negative for chest pain, palpitations and leg swelling.   Gastrointestinal:  Negative for abdominal pain, nausea and vomiting.   Skin:  Negative for wound.   Neurological:  Positive for dizziness. Negative for headaches.   Psychiatric/Behavioral:  Negative for confusion.    Objective     /84 (BP Location: Left arm, Patient Position: Sitting, BP Method: Medium (Manual))   Pulse 63   Resp 15   Ht 5' 4.5" (1.638 m)   Wt 55.9 kg (123 lb 4 oz)   LMP  (LMP Unknown)   SpO2 100%   BMI 20.83 kg/m²     Physical Exam   Constitutional: normal appearance. She appears well-developed and well-nourished.  Non-toxic appearance. No distress. She does not appear ill.   HENT:   Head: Normocephalic and atraumatic.   Eyes: Right eye exhibits no discharge. Left eye exhibits no discharge.   Cardiovascular: Normal rate, regular rhythm, normal heart sounds and normal pulses. Exam reveals no gallop and no friction rub.   No murmur heard.Pulmonary:      Effort: Pulmonary effort is normal. No respiratory distress.      Breath sounds: Normal breath sounds. No wheezing, rhonchi or rales.     Abdominal: Normal appearance.   Musculoskeletal:      Cervical back: Neck supple.   Lymphadenopathy:     She has no cervical adenopathy.   Neurological: She is alert.   Skin: Skin is warm and dry. Capillary refill takes less than 2 seconds. She is not diaphoretic.   Psychiatric: Her behavior is normal. Mood, judgment and thought content normal.   Vitals reviewed.   Assessment/Plan     Edith was seen today for follow-up.    Diagnoses and all orders for this visit:    Essential hypertension  -     CBC Auto Differential; Future  -     Lipid panel; Future  - Well controlled. Continue current regimen    Acquired hypothyroidism  -     TSH; Future  - Obtain labs and " adjust regimen as indicated    Mild intermittent asthma without complication  -stable. Continue advair    Hyponatremia  -     Comprehensive metabolic panel; Future  - If low on repeat lab then refer to endo    Chronic fatigue  -     CBC Auto Differential; Future  -     TSH; Future  -     Vitamin B12; Future  -     Vitamin D 25-Hydroxy; Future  -     Comprehensive metabolic panel; Future  - Obtain labs and adjust regimen as indicated  - if labs normal consider, discussing with her gyn about hormone levels.     Dizziness  -carotid US normal. Possibly due to hyponatremia?     Spinal enthesopathy of cervical region  -continue follow up with PM and ortho    Aortic atherosclerosis  -on crestor. Stress test normal. Counseled on BP and cholesterol control    Family history of premature CAD  -on crestor.     High risk medication use  -     Vitamin B12; Future  -     Vitamin D 25-Hydroxy; Future    Screening mammogram for breast cancer  -     Mammo Digital Screening Bilat w/ Santiago; Future      Follow up in about 6 months (around 8/6/2023), or if symptoms worsen or fail to improve.    Future Appointments   Date Time Provider Department Center   2/6/2023 11:10 AM [facility], LABORATORY [facility] LAB [facility]   2/7/2023  3:30 PM [facility] MAMMO1 [facility] MAMMO [facility]   2/13/2023  9:00 AM [facility]   2/14/2023  1:20 PM Yudi Heaton PA-C [facility] [place]   3/13/2023  9:00 AM [facility]   4/25/2023  1:40 PM Nestor Gtz MD [facility] PULM [place] [facility]   7/31/2023 10:00 AM Lane Omalley MD [facility] CARDIO1 [facility]   8/7/2023  9:40 AM Shanice Yang MD [facility] FAMMED [facility]       Shanice Yang MD  Family Medicine  [facility]

## 2023-02-07 LAB — VIT B12 SERPL-MCNC: 560 PG/ML (ref 210–950)

## 2023-02-13 ENCOUNTER — OFFICE VISIT (OUTPATIENT)
Dept: OBSTETRICS AND GYNECOLOGY | Facility: CLINIC | Age: 68
End: 2023-02-13
Payer: MEDICARE

## 2023-02-13 VITALS
SYSTOLIC BLOOD PRESSURE: 126 MMHG | BODY MASS INDEX: 21.53 KG/M2 | DIASTOLIC BLOOD PRESSURE: 86 MMHG | WEIGHT: 127.44 LBS

## 2023-02-13 DIAGNOSIS — Z98.890 STATUS POST HYSTEROSCOPY: Primary | ICD-10-CM

## 2023-02-13 PROCEDURE — 99999 PR PBB SHADOW E&M-EST. PATIENT-LVL III: CPT | Mod: PBBFAC,,, | Performed by: STUDENT IN AN ORGANIZED HEALTH CARE EDUCATION/TRAINING PROGRAM

## 2023-02-13 PROCEDURE — 99213 OFFICE O/P EST LOW 20 MIN: CPT | Mod: PBBFAC,PN | Performed by: STUDENT IN AN ORGANIZED HEALTH CARE EDUCATION/TRAINING PROGRAM

## 2023-02-13 PROCEDURE — 99999 PR PBB SHADOW E&M-EST. PATIENT-LVL III: ICD-10-PCS | Mod: PBBFAC,,, | Performed by: STUDENT IN AN ORGANIZED HEALTH CARE EDUCATION/TRAINING PROGRAM

## 2023-02-13 PROCEDURE — 99024 POSTOP FOLLOW-UP VISIT: CPT | Mod: POP,,, | Performed by: STUDENT IN AN ORGANIZED HEALTH CARE EDUCATION/TRAINING PROGRAM

## 2023-02-13 PROCEDURE — 99024 PR POST-OP FOLLOW-UP VISIT: ICD-10-PCS | Mod: POP,,, | Performed by: STUDENT IN AN ORGANIZED HEALTH CARE EDUCATION/TRAINING PROGRAM

## 2023-02-13 NOTE — PROGRESS NOTES
History & Physical  Gynecology      SUBJECTIVE:     Chief Complaint: Post-op Evaluation       History of Present Illness:  Here today s/p hscope D&C for PMB. Doing well, no further bleeding.     Pathology/Operative Findings:  UTERINE CURETTINGS:   - BENIGN ENDOMETRIAL POLYP AND BENIGN ENDOCERVICAL POLYP.     Findings:    Uterus sounded to7 cm.  Hysteroscopic findings include: benign appearing endometrial polyp.  Specimens obtained and sent to pathology.  Hemostasis obtained at the end of the procedure, fluid deficit 240cc. Would be reasonable vaginal hysterectomy candidate.       Review of patient's allergies indicates:   Allergen Reactions    Percocet [oxycodone-acetaminophen] Other (See Comments)     Headaches    Penicillins Rash       Past Medical History:   Diagnosis Date    Arthritis     Asthma     H/O: pneumonia     Hypertension     Hypotension, iatrogenic     post-op    Osteopenia     Post-menopausal bleeding 2023    Respiratory distress     Thyroid disease     hypo     Past Surgical History:   Procedure Laterality Date    AUGMENTATION OF BREAST Bilateral     removal    BREAST RECONSTRUCTION      COLONOSCOPY N/A 2022    Procedure: COLONOSCOPY;  Surgeon: Alessandra Springer MD;  Location: [facility] ENDO;  Service: General;  Laterality: N/A;    EPIDURAL STEROID INJECTION INTO CERVICAL SPINE N/A 2023    Procedure: Injection-steroid-epidural-cervical;  Surgeon: Larry Thomason MD;  Location: [facility] OR;  Service: Pain Management;  Laterality: N/A;  C7-T1    HIP REPLACEMENT ARTHROPLASTY Right     HYSTEROSCOPY WITH DILATION AND CURETTAGE OF UTERUS N/A 2023    Procedure: HYSTEROSCOPY, WITH DILATION AND CURETTAGE OF UTERUS;possible myosure;  Surgeon: Carie Dwyer MD;  Location: [facility];  Service: OB/GYN;  Laterality: N/A;    JOINT REPLACEMENT       OB History          2    Para   2    Term   2            AB        Living             SAB        IAB        Ectopic        Multiple        Live  Births                   Family History   Problem Relation Age of Onset    Heart disease Mother     Diabetes Mother     Stroke Mother     Heart attack Mother     Thyroid disease Mother     Heart attack Father     Breast cancer Neg Hx     Ovarian cancer Neg Hx      Social History     Tobacco Use    Smoking status: Never    Smokeless tobacco: Never   Substance Use Topics    Alcohol use: Yes     Alcohol/week: 2.0 standard drinks     Types: 2 Glasses of wine per week     Comment: socially    Drug use: Never       Current Outpatient Medications   Medication Sig    albuterol (PROVENTIL/VENTOLIN HFA) 90 mcg/actuation inhaler 2 puffs every 4 hours as needed for cough, wheeze, or shortness of breath    amLODIPine (NORVASC) 5 MG tablet Take 1 tablet (5 mg total) by mouth once daily.    estradioL (ESTRACE) 0.01 % (0.1 mg/gram) vaginal cream Place 1 g vaginally twice a week.    fluticasone-salmeterol 230-21 mcg/dose (ADVAIR HFA) 230-21 mcg/actuation HFAA inhaler Inhale 2 puffs into the lungs 2 (two) times daily. Controller (Patient taking differently: Inhale 2 puffs into the lungs 2 (two) times daily as needed. Controller)    ibuprofen (ADVIL,MOTRIN) 600 MG tablet Take 1 tablet (600 mg total) by mouth every 6 (six) hours as needed for Pain.    levothyroxine (SYNTHROID) 75 MCG tablet Take 1 tablet (75 mcg total) by mouth once daily.    metoprolol succinate (TOPROL-XL) 50 MG 24 hr tablet Take 1 tablet (50 mg total) by mouth once daily.    rosuvastatin (CRESTOR) 10 MG tablet Take 1 tablet (10 mg total) by mouth once daily.    traZODone (DESYREL) 50 MG tablet Take 1 tablet (50 mg total) by mouth every evening.     No current facility-administered medications for this visit.         Review of Systems:  Review of Systems   Constitutional:  Negative for chills, fatigue and fever.   HENT:  Negative for congestion.    Eyes:  Negative for visual disturbance.   Respiratory:  Negative for cough and shortness of breath.    Cardiovascular:   Negative for chest pain and palpitations.   Gastrointestinal:  Negative for abdominal distention, abdominal pain, constipation, diarrhea, nausea and vomiting.   Genitourinary:  Negative for difficulty urinating, dysuria, hematuria, vaginal bleeding and vaginal discharge.   Skin:  Negative for rash.   Neurological:  Negative for dizziness, seizures, light-headedness and headaches.   Hematological:  Does not bruise/bleed easily.   Psychiatric/Behavioral:  Negative for dysphoric mood. The patient is not nervous/anxious.       OBJECTIVE:     Physical Exam:  Physical Exam  Vitals reviewed.   Constitutional:       General: She is not in acute distress.     Appearance: Normal appearance. She is well-developed.   HENT:      Head: Normocephalic and atraumatic.   Cardiovascular:      Rate and Rhythm: Normal rate and regular rhythm.   Pulmonary:      Effort: Pulmonary effort is normal.   Abdominal:      General: There is no distension.      Palpations: Abdomen is soft.   Genitourinary:     Vagina: Normal.   Skin:     General: Skin is warm.   Neurological:      Mental Status: She is alert and oriented to person, place, and time.   Psychiatric:         Behavior: Behavior normal.         Thought Content: Thought content normal.         Judgment: Judgment normal.         ASSESSMENT:       ICD-10-CM ICD-9-CM    1. Status post hysteroscopy  Z98.890 V45.89           No orders of the defined types were placed in this encounter.          Plan:      - discussed pathology and benign appearance  - safe to start estrace cream    Carie Dwyer M.D.  Obstetrics and Gynecology

## 2023-02-14 ENCOUNTER — OFFICE VISIT (OUTPATIENT)
Dept: PAIN MEDICINE | Facility: CLINIC | Age: 68
End: 2023-02-14
Payer: MEDICARE

## 2023-02-14 DIAGNOSIS — M54.12 CERVICAL RADICULITIS: ICD-10-CM

## 2023-02-14 DIAGNOSIS — M50.30 DDD (DEGENERATIVE DISC DISEASE), CERVICAL: Primary | ICD-10-CM

## 2023-02-14 DIAGNOSIS — M54.2 NECK PAIN: ICD-10-CM

## 2023-02-14 DIAGNOSIS — G24.3 CERVICAL DYSTONIA: ICD-10-CM

## 2023-02-14 PROCEDURE — 99214 PR OFFICE/OUTPT VISIT, EST, LEVL IV, 30-39 MIN: ICD-10-PCS | Mod: S$PBB,,,

## 2023-02-14 PROCEDURE — 99213 OFFICE O/P EST LOW 20 MIN: CPT | Mod: PBBFAC,PN

## 2023-02-14 PROCEDURE — 99999 PR PBB SHADOW E&M-EST. PATIENT-LVL III: ICD-10-PCS | Mod: PBBFAC,,,

## 2023-02-14 PROCEDURE — 99999 PR PBB SHADOW E&M-EST. PATIENT-LVL III: CPT | Mod: PBBFAC,,,

## 2023-02-14 PROCEDURE — 99214 OFFICE O/P EST MOD 30 MIN: CPT | Mod: S$PBB,,,

## 2023-02-14 NOTE — PROGRESS NOTES
This note was completed with dictation software and grammatical errors may exist.    Referring Physician: No ref. provider found    PCP: Shanice Yang MD      CC: neck pain    INTERVAL HPI:   Edith Jackson is a 67 y.o. female established patient, new to me for the management of neck pain.  The patient is s/p C7-T1 cervical interlaminar epidural steroid injection under utilizing fluoroscopy on 1/13/2023 and reports of minimal relief. The patient reports of right sided neck pain that radiates into her shoulders. The patient also endorses anterior neck pain.  Pain worsens with sitting, standing, bending, coughing walking.  Pain improves with rest.  She takes NSAIDs with mild benefits.  She has tried physical therapy in the past with minimal relief. She denies numbness and tingling. The patient denies of any significant changes in her health since her last appointment. The patient also denies of any changes in the character of her pain since her last appointment.  Patient denies of any urinary/fecal incontinence, saddle anesthesia, or weakness.      HPI:   Edith Jackson is a 67 y.o. female referred to us for neck pain.  Neck pain has gradually worsened over the past 4 months.  No recent traumatic incident.  She has constant aching, burning, grabbing, sharp, deep pain in her posterior neck pain radiates to her bilateral shoulders.  Pain worsens with sitting, standing, bending, coughing walking.  Pain improves with rest.  She takes NSAIDs with mild benefits.  She has tried physical therapy with minimal benefit.  She underwent trigger point injections recently with minimal benefit.  She recently had MRI of the cervical spine.  He was evaluated by spine surgery referred to us for further interventional procedures.  She denies any worsening weakness.  No bowel bladder changes.    Interventional Procedures:  1/13/2023: C7-T1 cervical interlaminar epidural steroid injection under utilizing  fluoroscopy- minimal relief.     ROS:  CONSTITUTIONAL: No fevers, chills, night sweats, wt. loss, appetite changes  SKIN: no rashes or itching  ENT: No headaches, head trauma, vision changes, or eye pain  LYMPH NODES: None noticed   CV: No chest pain, palpitations.   RESP: No shortness of breath, dyspnea on exertion, cough, wheezing, or hemoptysis  GI: No nausea, emesis, diarrhea, constipation, melena, hematochezia, pain.    : No dysuria, hematuria, urgency, or frequency   HEME: No easy bruising, bleeding problems  PSYCHIATRIC: No depression, anxiety, psychosis, hallucinations.  NEURO: No seizures, memory loss, dizziness or difficulty sleeping  MSK:  Positive HPI      Past Medical History:   Diagnosis Date    Arthritis     Asthma     H/O: pneumonia     Hypertension     Hypotension, iatrogenic     post-op    Osteopenia     Post-menopausal bleeding 01/2023    Respiratory distress     Thyroid disease     hypo     Past Surgical History:   Procedure Laterality Date    AUGMENTATION OF BREAST Bilateral     removal    BREAST RECONSTRUCTION      COLONOSCOPY N/A 03/17/2022    Procedure: COLONOSCOPY;  Surgeon: Alessandra Springer MD;  Location: [facility] ENDO;  Service: General;  Laterality: N/A;    EPIDURAL STEROID INJECTION INTO CERVICAL SPINE N/A 01/13/2023    Procedure: Injection-steroid-epidural-cervical;  Surgeon: Larry Thomason MD;  Location: [facility] OR;  Service: Pain Management;  Laterality: N/A;  C7-T1    HIP REPLACEMENT ARTHROPLASTY Right     HYSTEROSCOPY WITH DILATION AND CURETTAGE OF UTERUS N/A 1/30/2023    Procedure: HYSTEROSCOPY, WITH DILATION AND CURETTAGE OF UTERUS;possible myosure;  Surgeon: Carie Dwyer MD;  Location: [facility];  Service: OB/GYN;  Laterality: N/A;    JOINT REPLACEMENT       Family History   Problem Relation Age of Onset    Heart disease Mother     Diabetes Mother     Stroke Mother     Heart attack Mother     Thyroid disease Mother     Heart attack Father     Breast cancer Neg Hx     Ovarian cancer Neg  Hx      Social History     Socioeconomic History    Marital status: Single   Tobacco Use    Smoking status: Never    Smokeless tobacco: Never   Substance and Sexual Activity    Alcohol use: Yes     Alcohol/week: 2.0 standard drinks     Types: 2 Glasses of wine per week     Comment: socially    Drug use: Never     Social Determinants of Health     Financial Resource Strain: Low Risk     Difficulty of Paying Living Expenses: Not very hard   Food Insecurity: Unknown    Worried About Running Out of Food in the Last Year: Patient refused    Ran Out of Food in the Last Year: Patient refused   Transportation Needs: Unknown    Lack of Transportation (Medical): Patient refused    Lack of Transportation (Non-Medical): Patient refused   Physical Activity: Unknown    Days of Exercise per Week: Patient refused   Stress: Unknown    Feeling of Stress : Patient refused   Social Connections: Unknown    Frequency of Communication with Friends and Family: Patient refused    Frequency of Social Gatherings with Friends and Family: Patient refused    Active Member of Clubs or Organizations: Patient refused    Attends Club or Organization Meetings: Patient refused    Marital Status: Patient refused   Housing Stability: Unknown    Unable to Pay for Housing in the Last Year: Patient refused    Unstable Housing in the Last Year: Patient refused         Medications/Allergies: See med card    There were no vitals filed for this visit.        Physical exam:    GENERAL: A and O x3, the patient appears well groomed and is in no acute distress.  Skin: No rashes or obvious lesions  HEENT: normocephalic, atraumatic  CARDIOVASCULAR:  Palpable peripheral pulses  LUNGS: easy work of breathing  ABDOMEN: soft, nontender   UPPER EXTREMITIES: Normal alignment, normal range of motion, no atrophy, no skin changes,  hair growth and nail growth normal and equal bilaterally. No swelling, no tenderness.    LOWER EXTREMITIES:  Normal alignment, normal range of  motion, no atrophy, no skin changes,  hair growth and nail growth normal and equal bilaterally. No swelling, no tenderness.  CERVICAL SPINE:  Cervical spine: ROM is limited in flexion, extension and lateral rotation with moderate increased pain.  Spurling's maneuver causes  neck pain to either side.  Myofascial exam:  Tenderness to palpation across cervical paraspinous region bilaterally. R>L.     MENTAL STATUS: normal orientation, speech, language, and fund of knowledge for social situation.  Emotional state appropriate.    MOTOR: Tone and bulk: normal bilateral upper and lower Strength: normal   SENSATION: Light touch and pinprick intact bilaterally  REFLEXES: normal, symmetric, nonbrisk.  Toes down, no clonus. No hoffmans.  GAIT: normal rise, base, steps, and arm swing.        Imaging:  MRI C-spine 11/2022  Impression:     1. Reversal of normal cervical lordosis.  2. Marrow edema along the right C2-3 and left C3-4 facets, presumably degenerative.  3. Multilevel cervical degenerative change including left-sided disc extrusions at C3-4 and C5-6.  Degenerative findings contribute to neural foraminal narrowing severe on the left at C3-4 and C5-6.  4. No high-grade spinal canal stenosis.    Assessment:  Edith Jackson is a 67 y.o.  female established patient, new to me, for the management of neck pain. The patient is s/p Cervical FRED on 1/13/23 with minimal relief. Patient localizes her neck pain to the right side of her neck with radiation into her shoulder blade.  MRI significant for multilevel neural foraminal stenosis and DDD. Treatment plan outlined below.    1. DDD (degenerative disc disease), cervical    2. Neck pain    3. Cervical radiculitis        Plan:  I have stressed the importance of physical activity and exercise to improve overall health  Reviewed pertinent imaging and records with patient  Schedule for right Cervical TFESI at C6-C7 and C7-T1   Referral placed to PT for dry needling of cervical spine    May consider cervical MBB if above is not helpful  Consider botox for cervical dystonia.    Follow up after procedure  Miracle Medina, NP

## 2023-02-24 ENCOUNTER — TELEPHONE (OUTPATIENT)
Dept: FAMILY MEDICINE | Facility: CLINIC | Age: 68
End: 2023-02-24
Payer: MEDICARE

## 2023-02-24 ENCOUNTER — OFFICE VISIT (OUTPATIENT)
Dept: FAMILY MEDICINE | Facility: CLINIC | Age: 68
End: 2023-02-24
Payer: MEDICARE

## 2023-02-24 VITALS
DIASTOLIC BLOOD PRESSURE: 86 MMHG | OXYGEN SATURATION: 98 % | HEART RATE: 56 BPM | WEIGHT: 126.19 LBS | HEIGHT: 65 IN | BODY MASS INDEX: 21.02 KG/M2 | SYSTOLIC BLOOD PRESSURE: 138 MMHG | RESPIRATION RATE: 15 BRPM

## 2023-02-24 DIAGNOSIS — G89.29 CHRONIC NECK PAIN: ICD-10-CM

## 2023-02-24 DIAGNOSIS — J06.9 UPPER RESPIRATORY TRACT INFECTION, UNSPECIFIED TYPE: ICD-10-CM

## 2023-02-24 DIAGNOSIS — R55 SYNCOPE, UNSPECIFIED SYNCOPE TYPE: Primary | ICD-10-CM

## 2023-02-24 DIAGNOSIS — M54.2 CHRONIC NECK PAIN: ICD-10-CM

## 2023-02-24 PROCEDURE — 99213 PR OFFICE/OUTPT VISIT, EST, LEVL III, 20-29 MIN: ICD-10-PCS | Mod: S$PBB,,,

## 2023-02-24 PROCEDURE — 99213 OFFICE O/P EST LOW 20 MIN: CPT | Mod: S$PBB,,,

## 2023-02-24 PROCEDURE — 99213 OFFICE O/P EST LOW 20 MIN: CPT | Mod: PBBFAC,PN

## 2023-02-24 PROCEDURE — 99999 PR PBB SHADOW E&M-EST. PATIENT-LVL III: CPT | Mod: PBBFAC,,,

## 2023-02-24 PROCEDURE — 99999 PR PBB SHADOW E&M-EST. PATIENT-LVL III: ICD-10-PCS | Mod: PBBFAC,,,

## 2023-02-24 RX ORDER — PREDNISONE 10 MG/1
10 TABLET ORAL DAILY
Qty: 5 TABLET | Refills: 0 | Status: SHIPPED | OUTPATIENT
Start: 2023-02-24 | End: 2023-02-24 | Stop reason: ALTCHOICE

## 2023-02-24 RX ORDER — PREDNISONE 20 MG/1
20 TABLET ORAL DAILY
Qty: 5 TABLET | Refills: 0 | Status: SHIPPED | OUTPATIENT
Start: 2023-02-24 | End: 2023-05-08

## 2023-02-24 NOTE — TELEPHONE ENCOUNTER
Attempted to contact Edith Jackson to discuss Scheduling an appointment.    Unable to leave message on phone - no voicemail or mailbox full on [phone] (home).    Magnolia Alberts LPN

## 2023-02-24 NOTE — TELEPHONE ENCOUNTER
----- Message from Erickson Baez sent at 2/24/2023  8:35 AM CST -----  Type:  Sooner Apoointment Request    Caller is requesting a sooner appointment.  Caller declined first available appointment listed below.  Caller will not accept being placed on the waitlist and is requesting a message be sent to doctor.  Name of Caller: Edith Jackson     When is the first available appointment?3/6/23     Symptoms:Dizziness , not sure if she has a sinus infection     Would the patient rather a call back or a response via [facility]? Call back     Best Call Back Number:[phone] (mobile) Pt     Additional Information:Pt is requesting same day visit

## 2023-02-24 NOTE — H&P (VIEW-ONLY)
"[facility] - Clinic Visit Note    Subjective:           Chief Complaint: Dizziness    Edith Jackson is a 67 y.o. female presenting to clinic today with c/o dizziness that started yesterday after bending over.   Also suffering from sinusitis and PND.  Home BP readings 120/70s, reports recent increase due to exacerbated neck pain.     Review of Systems   Constitutional:  Negative for chills and fever.   HENT:  Positive for congestion and postnasal drip.    Respiratory:  Negative for cough and shortness of breath.    Cardiovascular:  Negative for chest pain and leg swelling.   Gastrointestinal:  Negative for abdominal pain, constipation, diarrhea, nausea and vomiting.   Genitourinary:  Negative for difficulty urinating.   Musculoskeletal:  Positive for neck pain.   Neurological:  Positive for dizziness and light-headedness. Negative for headaches.   Psychiatric/Behavioral:  Positive for sleep disturbance.    All other systems reviewed and are negative.        Objective:      /86 (BP Location: Left arm, Patient Position: Sitting, BP Method: Medium (Manual))   Pulse (!) 56   Resp 15   Ht 5' 4.5" (1.638 m)   Wt 57.2 kg (126 lb 3.2 oz)   LMP  (LMP Unknown)   SpO2 98%   BMI 21.33 kg/m²   Physical Exam  Vitals and nursing note reviewed.   Constitutional:       Appearance: Normal appearance.   HENT:      Head: Normocephalic and atraumatic.      Left Ear: A middle ear effusion is present.      Nose: Nose normal.   Eyes:      Pupils: Pupils are equal, round, and reactive to light.   Cardiovascular:      Rate and Rhythm: Normal rate and regular rhythm.      Heart sounds: Normal heart sounds.   Pulmonary:      Effort: Pulmonary effort is normal.      Breath sounds: Normal breath sounds.   Abdominal:      General: Abdomen is flat.   Musculoskeletal:         General: Normal range of motion.      Cervical back: Normal range of motion.   Skin:     General: Skin is warm and dry.   Neurological:      Mental " Status: She is alert and oriented to person, place, and time.   Psychiatric:         Mood and Affect: Mood normal.         Behavior: Behavior normal.         Thought Content: Thought content normal.         Judgment: Judgment normal.       Assessment and Plan:       1. Syncope, unspecified syncope type  -     Discontinue: predniSONE (DELTASONE) 10 MG tablet; Take 1 tablet (10 mg total) by mouth once daily. for 5 days  Dispense: 5 tablet; Refill: 0  -     predniSONE (DELTASONE) 20 MG tablet; Take 1 tablet (20 mg total) by mouth once daily.  Dispense: 5 tablet; Refill: 0    2. Upper respiratory tract infection, unspecified type  -     Discontinue: predniSONE (DELTASONE) 10 MG tablet; Take 1 tablet (10 mg total) by mouth once daily. for 5 days  Dispense: 5 tablet; Refill: 0  -     predniSONE (DELTASONE) 20 MG tablet; Take 1 tablet (20 mg total) by mouth once daily.  Dispense: 5 tablet; Refill: 0    3. Chronic neck pain  -     Discontinue: predniSONE (DELTASONE) 10 MG tablet; Take 1 tablet (10 mg total) by mouth once daily. for 5 days  Dispense: 5 tablet; Refill: 0  -     predniSONE (DELTASONE) 20 MG tablet; Take 1 tablet (20 mg total) by mouth once daily.  Dispense: 5 tablet; Refill: 0        PLAN: Applies to all diagnosis and orders listed above.  - 5 day coarse of prednisone for neck pain, sinus congestion, and dizziness  - Follow up if symptoms worsen or fail to improve.     Discussed with patient the plan of care. Medications reviewed. Medication side effects discussed. Patient has no questions or concerns at this time. Reviewed, monitored, evaluated, and assessed chronic conditions as outlined above. Reviewed family, medical, surgical, and social history. Reviewed and discussed labs and imaging from the last 3 months.  Informed patient to please notify me with any questions or concerns at anytime.    Thank you,  MARJORIE Quintana  Family Medicine  [facility]

## 2023-03-08 ENCOUNTER — CLINICAL SUPPORT (OUTPATIENT)
Dept: REHABILITATION | Facility: HOSPITAL | Age: 68
End: 2023-03-08
Payer: MEDICARE

## 2023-03-08 DIAGNOSIS — M54.2 NECK PAIN: ICD-10-CM

## 2023-03-08 DIAGNOSIS — M50.30 DDD (DEGENERATIVE DISC DISEASE), CERVICAL: ICD-10-CM

## 2023-03-08 PROCEDURE — 97161 PT EVAL LOW COMPLEX 20 MIN: CPT | Mod: PN

## 2023-03-08 PROCEDURE — 97140 MANUAL THERAPY 1/> REGIONS: CPT | Mod: PN

## 2023-03-08 NOTE — PLAN OF CARE
[facility]  Physical Therapy Initial Evaluation    Name: Edith Jackson  Clinic Number: [id number]    Therapy Diagnosis:   Encounter Diagnoses   Name Primary?    DDD (degenerative disc disease), cervical     Neck pain      Physician: Miracle Medina NP    Physician Orders: PT Eval and Treat   Medical Diagnosis from Referral:   M50.30 (ICD-10-CM) - DDD (degenerative disc disease), cervical   M54.2 (ICD-10-CM) - Neck pain     Evaluation Date: 3/8/2023  Authorization Period Expiration: 3/8/2024  Plan of Care Expiration: 6/8/2023  Visit # / Visits authorized: 1/ 1  FOTO Visit #:  1/3     Time In: 11:00 am   Time Out: 12:00 am   Total Appointment Time (timed & untimed codes): 60  minutes    Precautions: Standard    Subjective   Date of onset: August 2022   History of current condition - Edith reports: couple of days after she returned from vacation - went zip lining in [place] in August 2022 - sustained what sounds like whiplash type of injury.  She reports immediate onset of neck and right upper trap pain - this has remained the same since her injury.  She has had one FRED in January that she reports was helpful. She has also had some local trigger point injections in neck and right upper trap in November 2022 from ortho spine surgeon.  Edith is scheduled for a second FRED on 3/17/2023.      Medical History:   Past Medical History:   Diagnosis Date    Arthritis     Asthma     H/O: pneumonia     Hypertension     Hypotension, iatrogenic     post-op    Osteopenia     Post-menopausal bleeding 01/2023    Respiratory distress     Thyroid disease     hypo       Surgical History:   Edith Jackson  has a past surgical history that includes Hip replacement arthroplasty (Right); Breast reconstruction; Augmentation of breast (Bilateral); Colonoscopy (N/A, 03/17/2022); Epidural steroid injection into cervical spine (N/A, 01/13/2023); Joint replacement; and Hysteroscopy with dilation and  curettage of uterus (N/A, 1/30/2023).    Medications:   Edith has a current medication list which includes the following prescription(s): albuterol, amlodipine, estradiol, fluticasone-salmeterol 230-21 mcg/dose, ibuprofen, levothyroxine, metoprolol succinate, prednisone, rosuvastatin, and trazodone.    Allergies:   Review of patient's allergies indicates:   Allergen Reactions    Percocet [oxycodone-acetaminophen] Other (See Comments)     Headaches    Penicillins Rash        Imaging, MRI studies: - Cervical Spine: 11/14/2022   Impression:     1. Reversal of normal cervical lordosis.  2. Marrow edema along the right C2-3 and left C3-4 facets, presumably degenerative.  3. Multilevel cervical degenerative change including left-sided disc extrusions at C3-4 and C5-6.  Degenerative findings contribute to neural foraminal narrowing severe on the left at C3-4 and C5-6.  4. No high-grade spinal canal stenosis.     Prior Therapy: couple of therapy visits at [facility] last September 2022   Social History: lives in 2 story home with friend    Occupation: not working   Prior Level of Function: walking/running 7 miles several times per week; active prior to August.   Current Level of Function:  Modified Independent; 4 ibuprofen in the mornings to get her day going, has continuous pain in her neck; some referred pain to right upper trap/shoulder; denies arm pain; dizziness started 2 weeks ago - interferes with her function - currently seeing an ENT.     Pain:  Current 7/10, worst 9/10, best 4/10   Location: right side of cervical spine   Description: Aching, Burning, Grabbing, and Tight  Aggravating Factors: looking to right, up or down; lifting things; washing/drying hair; driving   Easing Factors: pain medication, ice, and rest    Pts goals: To relieve her neck pain and improve her ROM so that she can return to her PLOF.     Objective       Observation: Edith ambulated into clinic - cautious gait pattern - stated she  "has been having new onset of "dizziness' - about 2 weeks now.     Posture:  forward head, scoliosis; flattened thoracic spine     Cervical Range of Motion: Active    Degrees Pain   Flexion 40  +   Extension 44 ++    Left Rotation 60    Right Rotation 42 ++    Left Side Bending 20    Right Side Bending 10       Shoulder Range of Motion:   Left:  WFL in all planes without any pain   Right: WFL in all planes without any pain     Strength:   Left Right   DNF      Upper trap 4/5 4/5   Mid trap 4-/5 4-/5   Lower trap 3+/5 3+/5   Rhomboids 3/5 3/5    Right 44/ Left 47      Upper Extremity Strength   Left Right   Shoulder flexion: 5/5 5/5   Shoulder Abduction: 5/5 5/5   Shoulder ER 5/5 5/5   Shoulder IR 5/5 5/5   Elbow flexion: 5/5 5/5   Elbow extension: 5/5 5/5   Wrist flexion: 4/5 4/5   Wrist extension: 4/5 4/5       Special Tests:  Distraction Negative   Compression Negative   Spurlings Negative   Sharp-Hazel Negative   VA test ?? - has dizziness with left rotation and extension; negative to right    Lateral Flexion Alar Ligament Negative     Upper Limb Neurodynamic testing:   Left  Right   BPNT Negative  Negative   UNT Negative  Negative   MNT Negative  Negative   RNT Negative  Negative         Joint Mobility: Passive ROM in cervical spine is WFL without increased pain, able to fully extend neck without any onset of dizziness, primarily occurs with left rotation - but only lasts for brief time; did have brief onset of dizziness with right rotation; ; patient does demonstrate some restriction in thoracic flexion;     Performed [place] on patient - negative for Eye Nystagmus with right and left ear testing; + dizziness (3-4 sec) on left ear; (+) dizziness (2-3 sec) on right ear. + dizziness with supine to sit and sit to supine; Patient has had one visit with ENT and has another one this coming Friday 3/10.      Palpation: moderate tenderness to palpation at cervical multifidus on right; right upper traps, right " levator and scalenes.     Sensation: intact to light touch     PT Evaluation Completed? Yes  Discussed Plan of Care with patient: Yes       Limitation/Restriction for FOTO Neck  Survey    Therapist reviewed FOTO scores for Edith Jackson on 3/8/2023.   FOTO documents entered into [employer] - see Media section.    Limitation Score: 58%         TREATMENT   Treatment Time In: 11:45   Treatment Time Out: 12:00   Total Treatment time (time-based codes) separate from Evaluation: 15 minutes    Edith received the treatments listed below:    MANUAL THERAPY TECHNIQUES including Joint mobilizations and Muscle Energy Technique were applied to Cervical/thoracic spine  for 15 minutes.  In seated position; resistance placed into left cervical rotation with lateral gliding to left of C7/T1/T2 - hold x 5 sec, increased range of right rotation and repeat x 3; Had patient then perform Active right cervical rotation x 3 with reduction in pain and improvement in AROM from 42 to 58 degrees noted.     Home Exercises and Patient Education Provided    Education provided:   - Explained to patient that she has myofascial restrictions in cervical musculature that are responsible for her pain and limited AROM; She demonstrates minimal joint pathology in her cervical spine; does have joint restrictions in upper thoracic that are contributing to her pain/motion loss as well.   -treatment will consist of manual tx with dry needling and MFR; Exercise/posture education to address deficits noted above.     Written Home Exercises Provided: Patient instructed to cont prior HEP.  Exercises were reviewed and Edith was able to demonstrate them prior to the end of the session. Reviewed upper trap/levator and scalene stretching with patient.   Edith demonstrated good  understanding of the education provided.     Assessment   Edith is a 67 y.o. female referred to outpatient Physical Therapy with a medical diagnosis of cervical DDD, chronic  neck pain . Pt presents with restrictions in active cervical/thoracic motion, myofascial pain syndrome cervical area, postural deficits, general Upper quarter weakness.     Pt prognosis is Good.   Pt will benefit from skilled outpatient Physical Therapy to address the deficits stated above and in the chart below, provide pt/family education, and to maximize pt's level of independence.     Plan of care discussed with patient: Yes  Pt's spiritual, cultural and educational needs considered and patient is agreeable to the plan of care and goals as stated below:     Anticipated Barriers for therapy: none     Medical Necessity is demonstrated by the following  History  Co-morbidities and personal factors that may impact the plan of care Co-morbidities:   COPD/asthma and HTN    Personal Factors:   no deficits     low   Examination  Body Structures and Functions, activity limitations and participation restrictions that may impact the plan of care Body Regions:   neck  upper extremities  trunk    Body Systems:    gross symmetry  ROM  strength  gross coordinated movement    Participation Restrictions:   None     Activity limitations:   Learning and applying knowledge  no deficits    General Tasks and Commands  no deficits    Communication  no deficits    Mobility  lifting and carrying objects  walking  driving (bike, car, motorcycle)    Self care  washing oneself (bathing, drying, washing hands)  caring for body parts (brushing teeth, shaving, grooming)  dressing    Domestic Life  shopping  cooking  doing house work (cleaning house, washing dishes, laundry)    Interactions/Relationships  no deficits    Life Areas  no deficits    Community and Social Life  no deficits         moderate   Clinical Presentation stable and uncomplicated low   Decision Making/ Complexity Score: low     Goals:  Short Term Goals: 3 weeks   Reduction in max level daily pain to 5/10 for improved ability to perform daily activities   Compliant with HEP      Long Term Goals: 6 weeks   Reduction in pain levels to no more than 2/10 for return to painfree performance of ADL's   Improvement in cervical AROM to WFL without increased pain/compensation   Able to lift 2# from waist to top cabinet with RUE without increased pain/compensation   Able to perform heavier activities without increased pain/compensation.  FOTO limitation score improved to 44%   Independent with HEP for continued improvement in function.     Plan   Plan of care Certification: 3/8/2023 to 6/8/2023.    Outpatient Physical Therapy 2 times weekly for 6 weeks to include the following interventions: Manual Therapy, Moist Heat/ Ice, Neuromuscular Re-ed, Patient Education, Therapeutic Activities, and Therapeutic Exercise.  Manual therapy to include Dry Needling, MFR.     Clair Bautista, PT

## 2023-03-09 ENCOUNTER — HOSPITAL ENCOUNTER (OUTPATIENT)
Dept: RADIOLOGY | Facility: HOSPITAL | Age: 68
Discharge: HOME | End: 2023-03-09
Attending: FAMILY MEDICINE
Payer: MEDICARE

## 2023-03-09 ENCOUNTER — CLINICAL SUPPORT (OUTPATIENT)
Dept: REHABILITATION | Facility: HOSPITAL | Age: 68
End: 2023-03-09
Attending: FAMILY MEDICINE
Payer: MEDICARE

## 2023-03-09 DIAGNOSIS — Z12.31 SCREENING MAMMOGRAM FOR BREAST CANCER: ICD-10-CM

## 2023-03-09 DIAGNOSIS — M54.2 NECK PAIN: Primary | ICD-10-CM

## 2023-03-09 DIAGNOSIS — R92.8 ABNORMALITY OF LEFT BREAST ON SCREENING MAMMOGRAM: Primary | ICD-10-CM

## 2023-03-09 PROCEDURE — 77063 MAMMO DIGITAL SCREENING BILAT WITH TOMO: ICD-10-PCS | Mod: 26,,, | Performed by: RADIOLOGY

## 2023-03-09 PROCEDURE — 77067 SCR MAMMO BI INCL CAD: CPT | Mod: TC

## 2023-03-09 PROCEDURE — 77067 MAMMO DIGITAL SCREENING BILAT WITH TOMO: ICD-10-PCS | Mod: 26,,, | Performed by: RADIOLOGY

## 2023-03-09 PROCEDURE — 77063 BREAST TOMOSYNTHESIS BI: CPT | Mod: 26,,, | Performed by: RADIOLOGY

## 2023-03-09 PROCEDURE — 97014 ELECTRIC STIMULATION THERAPY: CPT

## 2023-03-09 PROCEDURE — 77067 SCR MAMMO BI INCL CAD: CPT | Mod: 26,,, | Performed by: RADIOLOGY

## 2023-03-09 PROCEDURE — 97140 MANUAL THERAPY 1/> REGIONS: CPT

## 2023-03-09 NOTE — PROGRESS NOTES
Physical Therapy Daily Note     Name: Edith Rodriguez Jackson  Clinic Number: [id number]  Diagnosis:   Encounter Diagnosis   Name Primary?    Neck pain Yes     Physician: Miracle Medina NP  Precautions: falls   Visit #:   PTA Visit #:   Time In: 850  Time Out: 855    Subjective     Pt reports: she feels some better. Was dizzy for a while after last visit. States she has increased rom   Pain Scale: Edith rates pain on a scale of 0-10 to be 4 currently.  4  Objective     Edith received individual therapeutic exercises to develop strength, endurance, ROM, flexibility, and posture for 0 minutes includin    Edith received the following manual therapy techniques: Myofacial release, Soft tissue Mobilization, Friction Massage, and epleys maneuver and dry needling   were applied to the: occiput, R cervical para spinals and R trap  for 38 minutes including:  Dry needling to occiput and R cervical para spinals 23gauge 30 mm needles x 4, R trap 25 gauge 40 mm needles x 2 to R trap,  Added Estim no scan 4 hz to needles x 25 min.  Epleys maneuver   MYOFASCIAL RELEASE soft tissue mobilization deep tissue manipulation to neck and para spinals    67 y.o.  The patient received the following supervised modalities after being cleared for contradictions: IFC Electrical Stimulation:  Edith received IFC Electrical Stimulation for pain control applied to the needles at occiput and R neck/trap. Pt received stimulation at 0 % scan at a frequency of 4hz  for 25 minutes. Edith tolderated treatment well without any adverse effects.      Written Home Exercises Provided: reviewed with patient   Pt demo good understanding of the education provided. Edith demonstrated good return demonstration of activities.     Education provided re:  Edith verbalized good understanding of education provided.   No spiritual or educational barriers to learning  provided    Assessment     Patient tolerated dry needling   This is a 67 y.o. female referred to outpatient physical therapy and presents with a medical diagnosis of R neck pain  and demonstrates limitations as described in the problem list. Pt prognosis is Good. Pt will continue to benefit from skilled outpatient physical therapy to address the deficits listed in the problem list, provide pt/family education and to maximize pt's level of independence in the home and community environment.     Goals as follows:  Short Term Goals: 3 weeks   Reduction in max level daily pain to 5/10 for improved ability to perform daily activities   Compliant with HEP      Long Term Goals: 6 weeks   Reduction in pain levels to no more than 2/10 for return to painfree performance of ADL's   Improvement in cervical AROM to WFL without increased pain/compensation   Able to lift 2# from waist to top cabinet with RUE without increased pain/compensation   Able to perform heavier activities without increased pain/compensation.  FOTO limitation score improved to 44%   Independent with HEP for continued improvement in function.         Plan     Continue with established Plan of Care towards PT goals.    Therapist: Jasmyne Adame, PT  3/9/2023

## 2023-03-13 ENCOUNTER — CLINICAL SUPPORT (OUTPATIENT)
Dept: REHABILITATION | Facility: HOSPITAL | Age: 68
End: 2023-03-13
Payer: MEDICARE

## 2023-03-13 DIAGNOSIS — M54.2 NECK PAIN: Primary | ICD-10-CM

## 2023-03-13 PROCEDURE — 97014 ELECTRIC STIMULATION THERAPY: CPT

## 2023-03-13 PROCEDURE — 97110 THERAPEUTIC EXERCISES: CPT

## 2023-03-13 NOTE — PROGRESS NOTES
Physical Therapy Daily Note     Name: Edith Rodriguez [place]  Clinic Number: [id number]  Diagnosis:   Encounter Diagnosis   Name Primary?    Neck pain Yes     Physician: Miracle Medina NP  Precautions: falls   Visit #: 3  12  PTA Visit #:   Time In: 700  Time Out: 800    Subjective     Pt reports: states she was sore after last visit . Continues to complain of dizziness   Pain Scale: Edith rates pain on a scale of 0-10 to be 4 currently.  4  Objective     Edith received individual therapeutic exercises to develop strength, endurance, ROM, flexibility, and posture for 0 minutes includin    Edith received the following manual therapy techniques: Myofacial release, Soft tissue Mobilization, Friction Massage, and epleys maneuver and dry needling   were applied to the: occiput, R cervical para spinals and R trap  for 38 minutes including:  Dry needling to occiput and R cervical para spinals 23gauge 30 mm needles x 4, R trap 25 gauge 40 mm needles x 2 to R trap,  Added Estim no scan 4 hz to needles x 25 min.  Epleys maneuver   MYOFASCIAL RELEASE soft tissue mobilization deep tissue manipulation to neck and para spinals    67 y.o.  The patient received the following supervised modalities after being cleared for contradictions: IFC Electrical Stimulation:  Edith received IFC Electrical Stimulation for pain control applied to the needles at occiput and R neck/trap. Pt received stimulation at 0 % scan at a frequency of 4hz  for 25 minutes. Edith tolderated treatment well without any adverse effects.      Written Home Exercises Provided: reviewed with patient   Pt demo good understanding of the education provided. Edith demonstrated good return demonstration of activities.     Education provided re:  Edith verbalized good understanding of education provided.   No spiritual or educational barriers to learning provided    Assessment     Patient  tolerated dry needling   This is a 67 y.o. female referred to outpatient physical therapy and presents with a medical diagnosis of R neck pain  and demonstrates limitations as described in the problem list. Pt prognosis is Good. Pt will continue to benefit from skilled outpatient physical therapy to address the deficits listed in the problem list, provide pt/family education and to maximize pt's level of independence in the home and community environment.     Goals as follows:  Short Term Goals: 3 weeks   Reduction in max level daily pain to 5/10 for improved ability to perform daily activities   Compliant with HEP      Long Term Goals: 6 weeks   Reduction in pain levels to no more than 2/10 for return to painfree performance of ADL's   Improvement in cervical AROM to WFL without increased pain/compensation   Able to lift 2# from waist to top cabinet with RUE without increased pain/compensation   Able to perform heavier activities without increased pain/compensation.  FOTO limitation score improved to 44%   Independent with HEP for continued improvement in function.         Plan     Continue with established Plan of Care towards PT goals.    Therapist: Jasmyne Adame, PT  3/13/2023

## 2023-03-15 DIAGNOSIS — Z11.59 NEED FOR HEPATITIS C SCREENING TEST: ICD-10-CM

## 2023-03-16 ENCOUNTER — CLINICAL SUPPORT (OUTPATIENT)
Dept: REHABILITATION | Facility: HOSPITAL | Age: 68
End: 2023-03-16
Payer: MEDICARE

## 2023-03-16 DIAGNOSIS — M54.2 NECK PAIN: Primary | ICD-10-CM

## 2023-03-16 PROCEDURE — 97110 THERAPEUTIC EXERCISES: CPT

## 2023-03-16 PROCEDURE — 97140 MANUAL THERAPY 1/> REGIONS: CPT

## 2023-03-16 PROCEDURE — 97014 ELECTRIC STIMULATION THERAPY: CPT

## 2023-03-16 NOTE — PROGRESS NOTES
Physical Therapy Daily Note     Name: Edith Rodriguez [place]  Clinic Number: [id number]  Diagnosis:   Encounter Diagnosis   Name Primary?    Neck pain Yes     Physician: Miracle Medina NP  Precautions: falls   Visit #:  12  PTA Visit #:   Time In: 700  Time Out: 800    Subjective     Pt reports: states she has injection tomorrow. Continues to have pain with needles   Pain Scale: Edith rates pain on a scale of 0-10 to be 4 currently.  4  Objective     Edith received individual therapeutic exercises to develop strength, endurance, ROM, flexibility, and posture for 0 minutes includin    Edith received the following manual therapy techniques: Myofacial release, Soft tissue Mobilization, Friction Massage, and epleys maneuver and dry needling   were applied to the: occiput, R cervical para spinals and R trap  for 38 minutes including:  Dry needling to occiput and R cervical para spinals 23gauge 30 mm needles x 4, R trap 25 gauge 40 mm needles x 2 to R trap,  Added Estim no scan 4 hz to needles x 25 min.  Epleys maneuver   MYOFASCIAL RELEASE soft tissue mobilization deep tissue manipulation to neck and para spinals    67 y.o.  The patient received the following supervised modalities after being cleared for contradictions: IFC Electrical Stimulation:  Edith received IFC Electrical Stimulation for pain control applied to the needles at occiput and R neck/trap. Pt received stimulation at 0 % scan at a frequency of 4hz  for 25 minutes. Edith tolderated treatment well without any adverse effects.      Written Home Exercises Provided: reviewed with patient   Pt demo good understanding of the education provided. Edith demonstrated good return demonstration of activities.     Education provided re:  Edith verbalized good understanding of education provided.   No spiritual or educational barriers to learning provided    Assessment     Patient  tolerated dry needling   This is a 67 y.o. female referred to outpatient physical therapy and presents with a medical diagnosis of R neck pain  and demonstrates limitations as described in the problem list. Pt prognosis is Good. Pt will continue to benefit from skilled outpatient physical therapy to address the deficits listed in the problem list, provide pt/family education and to maximize pt's level of independence in the home and community environment.     Goals as follows:  Short Term Goals: 3 weeks   Reduction in max level daily pain to 5/10 for improved ability to perform daily activities   Compliant with HEP      Long Term Goals: 6 weeks   Reduction in pain levels to no more than 2/10 for return to painfree performance of ADL's   Improvement in cervical AROM to WFL without increased pain/compensation   Able to lift 2# from waist to top cabinet with RUE without increased pain/compensation   Able to perform heavier activities without increased pain/compensation.  FOTO limitation score improved to 44%   Independent with HEP for continued improvement in function.         Plan     Continue with established Plan of Care towards PT goals.    Therapist: Jasmyne Adame, PT  3/16/2023

## 2023-03-17 ENCOUNTER — HOSPITAL ENCOUNTER (OUTPATIENT)
Facility: AMBULARY SURGERY CENTER | Age: 68
Discharge: HOME | End: 2023-03-17
Attending: ANESTHESIOLOGY | Admitting: ANESTHESIOLOGY
Payer: MEDICARE

## 2023-03-17 DIAGNOSIS — M54.12 CERVICAL RADICULITIS: ICD-10-CM

## 2023-03-17 PROCEDURE — 64479 NJX AA&/STRD TFRM EPI C/T 1: CPT | Mod: RT | Performed by: ANESTHESIOLOGY

## 2023-03-17 PROCEDURE — 64480 PRA INJECT ANES/STEROID FORAMEN CERV/THORACIC W IMG GUIDE ,EA ADD LEVEL: ICD-10-PCS | Mod: RT,,, | Performed by: ANESTHESIOLOGY

## 2023-03-17 PROCEDURE — 64480 NJX AA&/STRD TFRM EPI C/T EA: CPT | Mod: RT | Performed by: ANESTHESIOLOGY

## 2023-03-17 PROCEDURE — 64479 PR INJECT ANES/STEROID FORAMEN CERV/THORACIC W IMG GUIDE ,1 LEVEL: ICD-10-PCS | Mod: RT,,, | Performed by: ANESTHESIOLOGY

## 2023-03-17 PROCEDURE — 64480 NJX AA&/STRD TFRM EPI C/T EA: CPT | Mod: RT,,, | Performed by: ANESTHESIOLOGY

## 2023-03-17 PROCEDURE — 64479 NJX AA&/STRD TFRM EPI C/T 1: CPT | Mod: RT,,, | Performed by: ANESTHESIOLOGY

## 2023-03-17 RX ORDER — LIDOCAINE HYDROCHLORIDE AND EPINEPHRINE 10; 10 MG/ML; UG/ML
INJECTION, SOLUTION INFILTRATION; PERINEURAL
Status: DISCONTINUED | OUTPATIENT
Start: 2023-03-17 | End: 2023-03-17 | Stop reason: HOSPADM

## 2023-03-17 RX ORDER — DEXAMETHASONE SODIUM PHOSPHATE 10 MG/ML
INJECTION INTRAMUSCULAR; INTRAVENOUS
Status: DISCONTINUED | OUTPATIENT
Start: 2023-03-17 | End: 2023-03-17 | Stop reason: HOSPADM

## 2023-03-17 RX ORDER — SODIUM CHLORIDE, SODIUM LACTATE, POTASSIUM CHLORIDE, CALCIUM CHLORIDE 600; 310; 30; 20 MG/100ML; MG/100ML; MG/100ML; MG/100ML
INJECTION, SOLUTION INTRAVENOUS CONTINUOUS
Status: ACTIVE | OUTPATIENT
Start: 2023-03-17

## 2023-03-17 RX ORDER — LIDOCAINE HYDROCHLORIDE 10 MG/ML
INJECTION, SOLUTION EPIDURAL; INFILTRATION; INTRACAUDAL; PERINEURAL
Status: DISCONTINUED | OUTPATIENT
Start: 2023-03-17 | End: 2023-03-17 | Stop reason: HOSPADM

## 2023-03-17 RX ORDER — FENTANYL CITRATE 50 UG/ML
INJECTION, SOLUTION INTRAMUSCULAR; INTRAVENOUS
Status: DISCONTINUED | OUTPATIENT
Start: 2023-03-17 | End: 2023-03-17 | Stop reason: HOSPADM

## 2023-03-17 RX ORDER — MIDAZOLAM HYDROCHLORIDE 1 MG/ML
INJECTION INTRAMUSCULAR; INTRAVENOUS
Status: DISCONTINUED | OUTPATIENT
Start: 2023-03-17 | End: 2023-03-17 | Stop reason: HOSPADM

## 2023-03-17 RX ORDER — BUPIVACAINE HYDROCHLORIDE 2.5 MG/ML
INJECTION, SOLUTION EPIDURAL; INFILTRATION; INTRACAUDAL
Status: DISCONTINUED | OUTPATIENT
Start: 2023-03-17 | End: 2023-03-17 | Stop reason: HOSPADM

## 2023-03-17 RX ADMIN — SODIUM CHLORIDE, SODIUM LACTATE, POTASSIUM CHLORIDE, CALCIUM CHLORIDE: 600; 310; 30; 20 INJECTION, SOLUTION INTRAVENOUS at 07:03

## 2023-03-17 NOTE — PLAN OF CARE
Patient is being driven home by Larry. She was brought to the car via wheelchair. Her upcoming appointments were reviewed.

## 2023-03-17 NOTE — OP NOTE
PROCEDURE DATE: 3/17/2023    PROCEDURE:Right C6-7, C7-T1 transforaminal epidural steroid injection under fluoroscopy     DIAGNOSIS: cervical disc displacement, cervical radiculopathy     Post op diagnosis: Same     PHYSICIAN: Larry Thomason MD     MEDICATIONS INJECTED: Decadron 5mg and 0.5ml 1% lidocaine at each nerve root.     LOCAL ANESTHETIC INJECTED: Lidocaine 1%. 1 ml per site.     SEDATION MEDICATIONS: RN IV sedation    ESTIMATED BLOOD LOSS: none     COMPLICATIONS: none     TECHNIQUE: A time-out was taken to identify patient and procedure side prior to starting the procedure. The patient was placed in a prone position, prepped and draped in the usual sterile fashion using ChloraPrep and sterile towels. The area to be injected was determined under fluoroscopic guidance in AP and oblique view. Local anesthetic was given by raising a wheal and going down to the hub of a 25-gauge 1.5 inch needle. In oblique view, a 2inch 25-gauge bent-tip spinal needle was introduced towards posterior inferior portion of the right C6-7, C7-T1 foramen using the oblique view. C-arm was rotated about 45 degrees off vertical and 10-20 degrees cephalad to caudal. The needle was advanced in AP until tip of needle was past the lateral margins. 1ml contrast dye was injected to confirm appropriate placement and that there was no vascular uptake.  Test dose of 1ml  1% lidocaine with epinephrine was given.  There was no changes in vital signs.  After negative aspiration for blood or CSF, the medication was then injected. This was performed at the right C6-7, C7-T1 level(s). The patient tolerated the procedure well.     The patient was monitored after the procedure. Patient was given post procedure and discharge instructions to follow at home. The patient was discharged in a stable condition.

## 2023-03-17 NOTE — DISCHARGE SUMMARY
[facility]  Discharge Note  Short Stay    Procedure(s) (LRB):  Injection,steroid,cervical,transforaminal,   Right C6-C7, C7-T1 (Right)      OUTCOME: Patient tolerated treatment/procedure well without complication and is now ready for discharge.    DISPOSITION: Home or Self Care    FINAL DIAGNOSIS:  <principal problem not specified>    FOLLOWUP: In clinic    DISCHARGE INSTRUCTIONS:    Discharge Procedure Orders   Notify your health care provider if you experience any of the following:  temperature >100.4     Notify your health care provider if you experience any of the following:  severe uncontrolled pain     Notify your health care provider if you experience any of the following:  redness, tenderness, or signs of infection (pain, swelling, redness, odor or green/yellow discharge around incision site)     Activity as tolerated        TIME SPENT ON DISCHARGE:   30 minutes

## 2023-03-20 VITALS
HEART RATE: 62 BPM | SYSTOLIC BLOOD PRESSURE: 116 MMHG | OXYGEN SATURATION: 100 % | TEMPERATURE: 97 F | BODY MASS INDEX: 21.31 KG/M2 | DIASTOLIC BLOOD PRESSURE: 76 MMHG | RESPIRATION RATE: 18 BRPM | WEIGHT: 126.13 LBS

## 2023-03-21 ENCOUNTER — CLINICAL SUPPORT (OUTPATIENT)
Dept: REHABILITATION | Facility: HOSPITAL | Age: 68
End: 2023-03-21
Payer: MEDICARE

## 2023-03-21 DIAGNOSIS — M50.30 DDD (DEGENERATIVE DISC DISEASE), CERVICAL: Primary | ICD-10-CM

## 2023-03-21 DIAGNOSIS — M54.2 NECK PAIN: ICD-10-CM

## 2023-03-21 NOTE — PROGRESS NOTES
Physical Therapy Daily Note     Name: Edith Rodriguez [facility] Number: [id number]  Diagnosis:   Encounter Diagnoses   Name Primary?    DDD (degenerative disc disease), cervical Yes    Neck pain        Physician: Miracle Medina NP  Precautions: falls   Visit #: 4 of 12 (not including eval) (3 visits in [facility])  PTA Visit #:   Time In: 10:55 am  Time Out: 11:50 am    Subjective     Pt reports: the injection helped but didn't resolve the whole problem.  Pain Scale: Edith rates pain on a scale of 0-10 to be 4 currently.  4  Objective     Edith received individual therapeutic exercises to develop strength, endurance, ROM, flexibility, and posture for 0 minutes includin    Edith received the following manual therapy techniques: Myofacial release, Soft tissue Mobilization, Myofascial Release and dry needling   were applied to the: neck for 53 minutes including:    MYOFASCIAL RELEASE soft tissue mobilization deep tissue manipulation to neck and para spinals  Myofascial Release   Bilateral pterygoids R>L   Sub occipitals   Upper traps/scalenes R>L   Light cranial lift    DNP  The patient received the following supervised modalities after being cleared for contradictions: IFC Electrical Stimulation:  Edith received IFC Electrical Stimulation for pain control applied to the needles at occiput and R neck/trap. Pt received stimulation at 0 % scan at a frequency of 4hz  for 0 minutes. Edith tolderated treatment well without any adverse effects.      Written Home Exercises Provided: reviewed with patient   Pt demo good understanding of the education provided. Edith demonstrated good return demonstration of activities.     Education provided re:  Edith verbalized good understanding of education provided.   No spiritual or educational barriers to learning provided    Assessment   Patient has several severe myofascial adhesions in right  pterygoid and right upper trap/scalene.  She didn't express an increase in pain after treatment and wants to continue with this approach.    This is a 67 y.o. female referred to outpatient physical therapy and presents with a medical diagnosis of R neck pain  and demonstrates limitations as described in the problem list. Pt prognosis is Good. Pt will continue to benefit from skilled outpatient physical therapy to address the deficits listed in the problem list, provide pt/family education and to maximize pt's level of independence in the home and community environment.     Goals as follows:  Short Term Goals: 3 weeks   Reduction in max level daily pain to 5/10 for improved ability to perform daily activities   Compliant with HEP      Long Term Goals: 6 weeks   Reduction in pain levels to no more than 2/10 for return to painfree performance of ADL's   Improvement in cervical AROM to WFL without increased pain/compensation   Able to lift 2# from waist to top cabinet with RUE without increased pain/compensation   Able to perform heavier activities without increased pain/compensation.  FOTO limitation score improved to 44%   Independent with HEP for continued improvement in function.         Plan     Continue with established Plan of Care towards PT goals.    Therapist: Osmani Fuller, PTA  3/21/2023

## 2023-03-23 ENCOUNTER — CLINICAL SUPPORT (OUTPATIENT)
Dept: REHABILITATION | Facility: HOSPITAL | Age: 68
End: 2023-03-23
Payer: MEDICARE

## 2023-03-23 DIAGNOSIS — M50.30 DDD (DEGENERATIVE DISC DISEASE), CERVICAL: Primary | ICD-10-CM

## 2023-03-23 PROCEDURE — 97140 MANUAL THERAPY 1/> REGIONS: CPT | Mod: PN,CQ

## 2023-03-23 NOTE — PROGRESS NOTES
Physical Therapy Daily Note     Name: Edith Rodriguez [facility] Number: [id number]  Diagnosis:   Encounter Diagnosis   Name Primary?    DDD (degenerative disc disease), cervical Yes       Physician: Miracle Medina NP  Precautions: falls   Visit #: 5 of 12 (not including eval) (3 visits in [facility])  PTA Visit #:   Time In: 10:55 am  Time Out: 11:50 am    Subjective     Pt reports: less clicking in the neck and better range of motion.  Pain Scale: Edith rates pain on a scale of 0-10 to be 2-3 currently (with certain movements).    Objective     Edith received individual therapeutic exercises to develop strength, endurance, ROM, flexibility, and posture for 0 minutes includin    Edith received the following manual therapy techniques: Myofacial release, Soft tissue Mobilization, Myofascial Release and dry needling   were applied to the: neck for 53 minutes including:    MYOFASCIAL RELEASE soft tissue mobilization deep tissue manipulation to neck and para spinals  Myofascial Release   Bilateral pterygoids R>L   Sub occipitals   Upper traps/scalenes R>L   Light cranial lift    DNP  The patient received the following supervised modalities after being cleared for contradictions: IFC Electrical Stimulation:  Edith received IFC Electrical Stimulation for pain control applied to the needles at occiput and R neck/trap. Pt received stimulation at 0 % scan at a frequency of 4hz  for 0 minutes. Edith tolderated treatment well without any adverse effects.      Written Home Exercises Provided: reviewed with patient   Pt demo good understanding of the education provided. Edith demonstrated good return demonstration of activities.     Education provided re:  Edith verbalized good understanding of education provided.   No spiritual or educational barriers to learning provided    Assessment   Patient has several severe myofascial adhesions in right  pterygoid and right upper trap/scalene.  Today, it was much better and the lift had a decent glide.    This is a 67 y.o. female referred to outpatient physical therapy and presents with a medical diagnosis of R neck pain  and demonstrates limitations as described in the problem list. Pt prognosis is Good. Pt will continue to benefit from skilled outpatient physical therapy to address the deficits listed in the problem list, provide pt/family education and to maximize pt's level of independence in the home and community environment.     Goals as follows:  Short Term Goals: 3 weeks   Reduction in max level daily pain to 5/10 for improved ability to perform daily activities   Compliant with HEP      Long Term Goals: 6 weeks   Reduction in pain levels to no more than 2/10 for return to painfree performance of ADL's   Improvement in cervical AROM to WFL without increased pain/compensation   Able to lift 2# from waist to top cabinet with RUE without increased pain/compensation   Able to perform heavier activities without increased pain/compensation.  FOTO limitation score improved to 44%   Independent with HEP for continued improvement in function.         Plan     Continue with established Plan of Care towards PT goals.    Therapist: Osmani Fuller, PTA  3/23/2023

## 2023-03-29 ENCOUNTER — CLINICAL SUPPORT (OUTPATIENT)
Dept: REHABILITATION | Facility: HOSPITAL | Age: 68
End: 2023-03-29
Payer: MEDICARE

## 2023-03-29 DIAGNOSIS — M54.2 NECK PAIN: Primary | ICD-10-CM

## 2023-03-29 PROCEDURE — 97014 ELECTRIC STIMULATION THERAPY: CPT | Mod: PN

## 2023-03-29 PROCEDURE — 97140 MANUAL THERAPY 1/> REGIONS: CPT | Mod: PN

## 2023-03-29 NOTE — PROGRESS NOTES
Physical Therapy Daily Note     Name: Edith Rodriguez [facility] Number: [id number]  Diagnosis:   Encounter Diagnosis   Name Primary?    Neck pain Yes       Physician: Miracle Medina NP  Precautions: falls   Visit #: 6 of 12 (not including eval) (3 visits in [facility])  PTA Visit #: 2/5  Time In:  1:00 pm   Time Out: 2:00 pm     Subjective     Pt reports:  less pain until this morning, noting some increased tightness on right side and front of neck; thinks she may have slept wrong.  Went to [facility] a couple of days ago - had treatment for vertigo and has felt better since.   Pain Scale: Edith rates pain on a scale of 0-10 to be 2-3 currently (with certain movements).    Objective     Edith received individual therapeutic exercises to develop strength, endurance, ROM, flexibility, and posture for 0 minutes including:      Edith received the following manual therapy techniques: Myofacial release, Soft tissue Mobilization, Myofascial Release and dry needling   were applied to the: neck for 53 minutes including:    MYOFASCIAL RELEASE soft tissue mobilization deep tissue manipulation to neck and para spinals:  sub-occipital inhibition deep breathing for several minutes to relax tissue; Gapping right mid-cervical segments to address restriction, lateral gliding C2/C3 to left for improvement in right rotation; MET for right/left levator and upper trap; Noting tightness/tenderness right anterior/middle scalene as well as right SCM; Patient agreeable to trial of dry needling for these tissues:  LF-ES per protocol for scalenes and SCM on the right; utilized 30/40 mm needles for two points on clavicular head of SCM, one point on sternal head/anterior scalene; right masseter, right middle scalene; Clockwise winding of needle for increased tissue grasp; Intensity of stimulation adjusted to patient tolerance, good rhythmical contraction of tissue noted;  stimulation unit set on 6HZ, 0% scam. Needles left in-situ x 15 mins.   Removed needles and assisted patient to sitting at edge of table;   Provided moist heat to cervical spine x 12 mins.     Written Home Exercises Provided: reviewed with patient   Pt demo good understanding of the education provided. Edith demonstrated good return demonstration of activities.     Education provided re:  Edith verbalized good understanding of education provided.   No spiritual or educational barriers to learning provided    Assessment   Patient has improved significantly since her initial evaluation; decrease in mm tension/tightness noted; improvement in ability to move her head/neck with less pain; Also has had some treatment for her vertigo - reported no dizziness for past couple of days; tolerated manual treatment today without any onset of vertigo.     This is a 67 y.o. female referred to outpatient physical therapy and presents with a medical diagnosis of R neck pain  and demonstrates limitations as described in the problem list. Pt prognosis is Good. Pt will continue to benefit from skilled outpatient physical therapy to address the deficits listed in the problem list, provide pt/family education and to maximize pt's level of independence in the home and community environment.     Goals as follows:  Short Term Goals: 3 weeks   Reduction in max level daily pain to 5/10 for improved ability to perform daily activities   Compliant with HEP      Long Term Goals: 6 weeks   Reduction in pain levels to no more than 2/10 for return to painfree performance of ADL's   Improvement in cervical AROM to WFL without increased pain/compensation   Able to lift 2# from waist to top cabinet with RUE without increased pain/compensation   Able to perform heavier activities without increased pain/compensation.  FOTO limitation score improved to 44%   Independent with HEP for continued improvement in function.         Plan     Continue with  established Plan of Care towards PT goals.    Therapist: Clair Bautista, PT  3/29/2023

## 2023-03-30 ENCOUNTER — HOSPITAL ENCOUNTER (OUTPATIENT)
Dept: RADIOLOGY | Facility: HOSPITAL | Age: 68
Discharge: HOME | End: 2023-03-30
Attending: FAMILY MEDICINE
Payer: MEDICARE

## 2023-03-30 DIAGNOSIS — R92.8 ABNORMALITY OF LEFT BREAST ON SCREENING MAMMOGRAM: ICD-10-CM

## 2023-03-30 PROCEDURE — 77061 BREAST TOMOSYNTHESIS UNI: CPT | Mod: 26,LT,, | Performed by: RADIOLOGY

## 2023-03-30 PROCEDURE — 77065 DX MAMMO INCL CAD UNI: CPT | Mod: 26,LT,, | Performed by: RADIOLOGY

## 2023-03-30 PROCEDURE — 77065 MAMMO DIGITAL DIAGNOSTIC LEFT WITH TOMO: ICD-10-PCS | Mod: 26,LT,, | Performed by: RADIOLOGY

## 2023-03-30 PROCEDURE — 76642 ULTRASOUND BREAST LIMITED: CPT | Mod: 26,LT,, | Performed by: RADIOLOGY

## 2023-03-30 PROCEDURE — 77065 DX MAMMO INCL CAD UNI: CPT | Mod: TC,LT

## 2023-03-30 PROCEDURE — 76642 ULTRASOUND BREAST LIMITED: CPT | Mod: TC,LT

## 2023-03-30 PROCEDURE — 76642 US BREAST LEFT LIMITED: ICD-10-PCS | Mod: 26,LT,, | Performed by: RADIOLOGY

## 2023-03-30 PROCEDURE — 77061 MAMMO DIGITAL DIAGNOSTIC LEFT WITH TOMO: ICD-10-PCS | Mod: 26,LT,, | Performed by: RADIOLOGY

## 2023-03-31 ENCOUNTER — CLINICAL SUPPORT (OUTPATIENT)
Dept: REHABILITATION | Facility: HOSPITAL | Age: 68
End: 2023-03-31
Payer: MEDICARE

## 2023-03-31 DIAGNOSIS — M54.2 NECK PAIN: ICD-10-CM

## 2023-03-31 DIAGNOSIS — M50.30 DDD (DEGENERATIVE DISC DISEASE), CERVICAL: Primary | ICD-10-CM

## 2023-03-31 PROCEDURE — 97140 MANUAL THERAPY 1/> REGIONS: CPT | Mod: PN,CQ

## 2023-03-31 NOTE — PROGRESS NOTES
Physical Therapy Daily Note     Name: Edith Rodriguez [facility] Number: [id number]  Diagnosis:   Encounter Diagnoses   Name Primary?    DDD (degenerative disc disease), cervical Yes    Neck pain          Physician: Miracle Medina NP  Precautions: falls   Visit #: 8 of 12 (not including eval) (3 visits in [facility])  PTA Visit #: 1/5  Time In:  2:30 pm   Time Out: 3:15 pm     Subjective     Pt reports:  improvement in range of motion and pain since starting therapy.  Pain Scale: Edith rates pain on a scale of 0-10 to be 2-3 currently (with certain movements).    Objective     Edith received individual therapeutic exercises to develop strength, endurance, ROM, flexibility, and posture for 0 minutes including:      Edith received the following manual therapy techniques: Myofacial release, Soft tissue Mobilization, Myofascial Release and dry needling   were applied to the: neck for 45 minutes including:    MYOFASCIAL RELEASE    Bilateral pterygoids   Sub occipitals   Traps/scalenes    DNP today  LF-ES per protocol for scalenes and SCM on the right; utilized 30/40 mm needles for two points on clavicular head of SCM, one point on sternal head/anterior scalene; right masseter, right middle scalene; Clockwise winding of needle for increased tissue grasp; Intensity of stimulation adjusted to patient tolerance, good rhythmical contraction of tissue noted; stimulation unit set on 6HZ, 0% scam. Needles left in-situ x 15 mins.   Removed needles and assisted patient to sitting at edge of table;   Provided moist heat to cervical spine x 12 mins.     Written Home Exercises Provided: reviewed with patient   Pt demo good understanding of the education provided. Edith demonstrated good return demonstration of activities.     Education provided re:  Edith verbalized good understanding of education provided.   No spiritual or educational barriers to learning  provided    Assessment   Patient has improved significantly since her initial evaluation; decrease in mm tension/tightness noted; improvement in ability to move her head/neck with less pain; Also has had some treatment for her vertigo - reported no dizziness for past couple of days; tolerated manual treatment today without any onset of vertigo.     This is a 67 y.o. female referred to outpatient physical therapy and presents with a medical diagnosis of R neck pain  and demonstrates limitations as described in the problem list. Pt prognosis is Good. Pt will continue to benefit from skilled outpatient physical therapy to address the deficits listed in the problem list, provide pt/family education and to maximize pt's level of independence in the home and community environment.     Goals as follows:  Short Term Goals: 3 weeks   Reduction in max level daily pain to 5/10 for improved ability to perform daily activities   Compliant with HEP      Long Term Goals: 6 weeks   Reduction in pain levels to no more than 2/10 for return to painfree performance of ADL's   Improvement in cervical AROM to WFL without increased pain/compensation   Able to lift 2# from waist to top cabinet with RUE without increased pain/compensation   Able to perform heavier activities without increased pain/compensation.  FOTO limitation score improved to 44%   Independent with HEP for continued improvement in function.         Plan     Continue with established Plan of Care towards PT goals.    Therapist: Osmani Fuller, PTA  3/31/2023

## 2023-04-04 ENCOUNTER — CLINICAL SUPPORT (OUTPATIENT)
Dept: REHABILITATION | Facility: HOSPITAL | Age: 68
End: 2023-04-04
Payer: MEDICARE

## 2023-04-04 DIAGNOSIS — M50.30 DDD (DEGENERATIVE DISC DISEASE), CERVICAL: Primary | ICD-10-CM

## 2023-04-04 PROCEDURE — 97140 MANUAL THERAPY 1/> REGIONS: CPT | Mod: PN,CQ

## 2023-04-04 NOTE — PROGRESS NOTES
Physical Therapy Daily Note     Name: Edith Rodriguez [facility] Number: [id number]  Diagnosis:   Encounter Diagnosis   Name Primary?    DDD (degenerative disc disease), cervical Yes           Physician: Miracle Medina NP  Precautions: falls   Visit #: 9 of 12 (not including eval) (3 visits in [facility])  PTA Visit #: 1/5  Time In:  10:00 am  Time Out: 10:53 am    Subjective     Pt reports: getting better.  It seem like it has centralized.  Pain Scale: Edith rates pain on a scale of 0-10 to be 1-2 currently (with certain movements).    Objective     Edith received individual therapeutic exercises to develop strength, endurance, ROM, flexibility, and posture for 0 minutes including:      Edith received the following manual therapy techniques: Myofacial release, Soft tissue Mobilization, Myofascial Release and dry needling   were applied to the: neck for 53 minutes including:    MYOFASCIAL RELEASE    Bilateral pterygoids   Sub occipitals   Traps/scalenes   Bilateral sub scap    DNP today  LF-ES per protocol for scalenes and SCM on the right; utilized 30/40 mm needles for two points on clavicular head of SCM, one point on sternal head/anterior scalene; right masseter, right middle scalene; Clockwise winding of needle for increased tissue grasp; Intensity of stimulation adjusted to patient tolerance, good rhythmical contraction of tissue noted; stimulation unit set on 6HZ, 0% scam. Needles left in-situ x 15 mins.   Removed needles and assisted patient to sitting at edge of table;   Provided moist heat to cervical spine x 12 mins.     Written Home Exercises Provided: reviewed with patient   Pt demo good understanding of the education provided. Edith demonstrated good return demonstration of activities.     Education provided re:  Edith verbalized good understanding of education provided.   No spiritual or educational barriers to learning  provided    Assessment   Patient has improved significantly since her initial evaluation; decrease in mm tension/tightness noted; improvement in ability to move her head/neck with less pain; Also has had some treatment for her vertigo - reported no dizziness for past couple of days; tolerated manual treatment today without any onset of vertigo.     This is a 67 y.o. female referred to outpatient physical therapy and presents with a medical diagnosis of R neck pain  and demonstrates limitations as described in the problem list. Pt prognosis is Good. Pt will continue to benefit from skilled outpatient physical therapy to address the deficits listed in the problem list, provide pt/family education and to maximize pt's level of independence in the home and community environment.     Goals as follows:  Short Term Goals: 3 weeks   Reduction in max level daily pain to 5/10 for improved ability to perform daily activities   Compliant with HEP      Long Term Goals: 6 weeks   Reduction in pain levels to no more than 2/10 for return to painfree performance of ADL's   Improvement in cervical AROM to WFL without increased pain/compensation   Able to lift 2# from waist to top cabinet with RUE without increased pain/compensation   Able to perform heavier activities without increased pain/compensation.  FOTO limitation score improved to 44%   Independent with HEP for continued improvement in function.         Plan     Continue with established Plan of Care towards PT goals.    Therapist: Osmani Fuller, PTA  4/4/2023

## 2023-04-11 ENCOUNTER — CLINICAL SUPPORT (OUTPATIENT)
Dept: REHABILITATION | Facility: HOSPITAL | Age: 68
End: 2023-04-11
Payer: MEDICARE

## 2023-04-11 DIAGNOSIS — M54.2 NECK PAIN: Primary | ICD-10-CM

## 2023-04-11 PROCEDURE — 97140 MANUAL THERAPY 1/> REGIONS: CPT | Mod: PN

## 2023-04-11 NOTE — PROGRESS NOTES
"                                                    Physical Therapy Daily Note     Name: Edith Rodriguez [facility] Number: [id number]  Diagnosis:   Encounter Diagnosis   Name Primary?    Neck pain Yes           Physician: Miracle Medina NP  Precautions: falls   Visit #: 9  of 12 (not including eval) (3 visits in [facility])  PTA Visit #: 0/5  Time In:  12:30 pm  Time Out:  1:30 pm    Subjective     Pt reports: patient reports continued improvement, but "i'm not there yet".   Pain Scale: Edith rates pain on a scale of 0-10 to be 1-2 currently (with certain movements).    Objective     Edith received individual therapeutic exercises to develop strength, endurance, ROM, flexibility, and posture for 0 minutes including:      Edith received the following manual therapy techniques: Myofacial release, soft tissue and joint mobilization   were applied to the: neck for 53 minutes including:  Supine: sub-occipital release with prolonged hold/traction; Lateral gliding of C2 to left with MET; gapping mid cervical segments with neck into flexion on right to reduce nerve root impingement; STM traps/scalenes; mobilization of 1st rib on right, clavicle (R) mobilization into ER/IR rotation; Facet lifts-upslides bialterally from mid thoracic to cervical for general mobility/relaxation.   Improved Right cervcial rotation without increased symptoms noted after manual tx - able to rotate chin to A/C without restriction in supine right/left.     Moist heat to cervical spine x 10 mins following tx.     DNP today  LF-ES per protocol for scalenes and SCM on the right; utilized 30/40 mm needles for two points on clavicular head of SCM, one point on sternal head/anterior scalene; right masseter, right middle scalene; Clockwise winding of needle for increased tissue grasp; Intensity of stimulation adjusted to patient tolerance, good rhythmical contraction of tissue noted; stimulation unit set on 6HZ, 0% scam. Needles left " in-situ x 15 mins.   Removed needles and assisted patient to sitting at edge of table;   Provided moist heat to cervical spine x 12 mins.     Written Home Exercises Provided: reviewed with patient   Pt demo good understanding of the education provided. Edith demonstrated good return demonstration of activities.     Education provided re:  Edith verbalized good understanding of education provided.   No spiritual or educational barriers to learning provided    Assessment   Patient has improved significantly since her initial evaluation; decrease in mm tension/tightness noted; improvement in ability to move her head/neck with less pain; Also has had some treatment for her vertigo - reported no dizziness for past couple of days; tolerated manual treatment today without any onset of vertigo.     This is a 67 y.o. female referred to outpatient physical therapy and presents with a medical diagnosis of R neck pain  and demonstrates limitations as described in the problem list. Pt prognosis is Good. Pt will continue to benefit from skilled outpatient physical therapy to address the deficits listed in the problem list, provide pt/family education and to maximize pt's level of independence in the home and community environment.     Goals as follows:  Short Term Goals: 3 weeks   Reduction in max level daily pain to 5/10 for improved ability to perform daily activities   Compliant with HEP      Long Term Goals: 6 weeks   Reduction in pain levels to no more than 2/10 for return to painfree performance of ADL's   Improvement in cervical AROM to WFL without increased pain/compensation   Able to lift 2# from waist to top cabinet with RUE without increased pain/compensation   Able to perform heavier activities without increased pain/compensation.  FOTO limitation score improved to 44%   Independent with HEP for continued improvement in function.         Plan     Continue with established Plan of Care towards PT  goals.    Therapist: Clair Bautista, PT  4/11/2023

## 2023-04-14 ENCOUNTER — TELEPHONE (OUTPATIENT)
Dept: PAIN MEDICINE | Facility: CLINIC | Age: 68
End: 2023-04-14
Payer: MEDICARE

## 2023-04-14 ENCOUNTER — CLINICAL SUPPORT (OUTPATIENT)
Dept: REHABILITATION | Facility: HOSPITAL | Age: 68
End: 2023-04-14
Payer: MEDICARE

## 2023-04-14 DIAGNOSIS — M50.30 DDD (DEGENERATIVE DISC DISEASE), CERVICAL: Primary | ICD-10-CM

## 2023-04-14 PROCEDURE — 97140 MANUAL THERAPY 1/> REGIONS: CPT | Mod: PN,CQ

## 2023-04-14 NOTE — PROGRESS NOTES
Physical Therapy Daily Note     Name: Edith Rodriguez [facility] Number: [id number]  Diagnosis:   Encounter Diagnosis   Name Primary?    DDD (degenerative disc disease), cervical Yes           Physician: Miracle Medina NP  Precautions: falls   Visit #: 10  of 12 (not including eval) (3 visits in [facility])  PTA Visit #: 1/5  Time In:  2:30 pm  Time Out:  3:15 pm    Subjective     Pt reports: feeling tight on the right trap going up to her neck.  Pain Scale: Edith rates pain on a scale of 0-10 to be 3 currently (with certain movements).    Objective     Edith received individual therapeutic exercises to develop strength, endurance, ROM, flexibility, and posture for 0 minutes including:      Edith received the following manual therapy techniques: Myofacial release, soft tissue and joint mobilization   were applied to the: neck for 45 minutes including:  Myofascial release   Right pec minor (new, and it released)   Bilateral pterygoids   Sub occipitals   Upper trap/scalenes   Moderate cranial lift (felt buzzing in the right ear)    DNP today  LF-ES per protocol for scalenes and SCM on the right; utilized 30/40 mm needles for two points on clavicular head of SCM, one point on sternal head/anterior scalene; right masseter, right middle scalene; Clockwise winding of needle for increased tissue grasp; Intensity of stimulation adjusted to patient tolerance, good rhythmical contraction of tissue noted; stimulation unit set on 6HZ, 0% scam. Needles left in-situ x 15 mins.   Removed needles and assisted patient to sitting at edge of table;   Provided moist heat to cervical spine x 12 mins.     Written Home Exercises Provided: reviewed with patient   Pt demo good understanding of the education provided. Edith demonstrated good return demonstration of activities.     Education provided re:  Edith verbalized good understanding of education provided.   No  spiritual or educational barriers to learning provided    Assessment   Patient got a good right pec minor and cranial lift release today.  She felt a little buz in the right ear during the lift but it was brief.  No signs of dizziness after session and no increase in pain noted.    This is a 67 y.o. female referred to outpatient physical therapy and presents with a medical diagnosis of R neck pain  and demonstrates limitations as described in the problem list. Pt prognosis is Good. Pt will continue to benefit from skilled outpatient physical therapy to address the deficits listed in the problem list, provide pt/family education and to maximize pt's level of independence in the home and community environment.     Goals as follows:  Short Term Goals: 3 weeks   Reduction in max level daily pain to 5/10 for improved ability to perform daily activities   Compliant with HEP      Long Term Goals: 6 weeks   Reduction in pain levels to no more than 2/10 for return to painfree performance of ADL's   Improvement in cervical AROM to WFL without increased pain/compensation   Able to lift 2# from waist to top cabinet with RUE without increased pain/compensation   Able to perform heavier activities without increased pain/compensation.  FOTO limitation score improved to 44%   Independent with HEP for continued improvement in function.         Plan     Continue with established Plan of Care towards PT goals.    Therapist: Osmani Fuller, PTA  4/14/2023

## 2023-04-18 ENCOUNTER — PATIENT MESSAGE (OUTPATIENT)
Dept: SURGERY | Facility: CLINIC | Age: 68
End: 2023-04-18
Payer: MEDICARE

## 2023-04-18 ENCOUNTER — CLINICAL SUPPORT (OUTPATIENT)
Dept: REHABILITATION | Facility: HOSPITAL | Age: 68
End: 2023-04-18
Payer: MEDICARE

## 2023-04-18 DIAGNOSIS — M54.2 NECK PAIN: Primary | ICD-10-CM

## 2023-04-18 PROCEDURE — 97140 MANUAL THERAPY 1/> REGIONS: CPT | Mod: PN

## 2023-04-18 NOTE — PROGRESS NOTES
Physical Therapy Daily Note     Name: Edith Rodriguez [facility] Number: [id number]  Diagnosis:   Encounter Diagnosis   Name Primary?    Neck pain Yes       Physician: Miracle Medina NP  Precautions: falls   Visit #: 11  of 12 (not including eval) (3 visits in [facility])  PTA Visit #:  0/5  Time In:  1: 30 pm  Time Out:  2:30 pm  FOTO:  2/3  - last score update 4/18 : 39% limitation     Subjective     Pt reports: I'm feeling much better; just a little tightness at times on the right side, but much improved.  Black eye noted on left - while walking and talking on phone, clipped the side mirror on a truck with sunglasses - jammed them into her eye orbit/check bone.     Pain Scale: Edith rates pain on a scale of 0-10 to be 2-3 currently (with certain movements).    Objective       Cervical Range of Motion: Active  No pain noted after manual tx.     Degrees Pain   Flexion 40 > 60     Extension 44 > 58    Left Rotation 60 >68     Right Rotation 42 >60    Left Side Bending 20     Right Side Bending 10          Edith received individual therapeutic exercises to develop strength, endurance, ROM, flexibility, and posture for 0 minutes including:      Edith received the following manual therapy techniques: Myofacial release, soft tissue and joint mobilization   were applied to the: neck for 45 minutes including:  Supine: sub-occipital inhibition/release; OA flexion to address restriction; lateral gliding with rotation TP of C3 to right; lateral gliding Right C2; upslides facets bilaterally from T6 to upper cervical; upper trap/levator MET on right/left to address tissue tightness; Depression first rib on right with breathing techniques; internal rotation right clavicle at SC/AC.   Light cranial lift with hold.       DNP today  LF-ES per protocol for scalenes and SCM on the right; utilized 30/40 mm needles for two points on clavicular head of SCM, one point on  sternal head/anterior scalene; right masseter, right middle scalene; Clockwise winding of needle for increased tissue grasp; Intensity of stimulation adjusted to patient tolerance, good rhythmical contraction of tissue noted; stimulation unit set on 6HZ, 0% scam. Needles left in-situ x 15 mins.   Removed needles and assisted patient to sitting at edge of table;   Provided moist heat to cervical spine x 12 mins.     Written Home Exercises Provided: reviewed with patient   Pt demo good understanding of the education provided. Edith demonstrated good return demonstration of activities.     Education provided re:  Edith verbalized good understanding of education provided.   No spiritual or educational barriers to learning provided    Assessment   Patient demonstrating significant improvement in cervical AROM without increase in her pain today; Edith is still very reluctant to do any lifting with her RUE as she feels her muscles tighten in her neck and doesn't want to aggravate anything. Will need to address functional movements with Ue's when she returns from her trip.     This is a 67 y.o. female referred to outpatient physical therapy and presents with a medical diagnosis of R neck pain  and demonstrates limitations as described in the problem list. Pt prognosis is Good. Pt will continue to benefit from skilled outpatient physical therapy to address the deficits listed in the problem list, provide pt/family education and to maximize pt's level of independence in the home and community environment.     Goals as follows:  Short Term Goals: 3 weeks   Reduction in max level daily pain to 5/10 for improved ability to perform daily activities  > MET   Compliant with HEP > MET      Long Term Goals: 6 weeks   Reduction in pain levels to no more than 2/10 for return to painfree performance of ADL's  > Improving   Improvement in cervical AROM to WFL without increased pain/compensation > Improving   Able to lift 2# from  waist to top cabinet with RUE without increased pain/compensation   Able to perform heavier activities without increased pain/compensation.  FOTO limitation score improved to 44%  > Exceeded 39%   Independent with HEP for continued improvement in function.         Plan     Continue with established Plan of Care towards PT goals.    Therapist: Clair Bautista, PT  4/18/2023

## 2023-04-19 DIAGNOSIS — M54.12 CERVICAL RADICULITIS: Primary | ICD-10-CM

## 2023-04-19 NOTE — TELEPHONE ENCOUNTER
Had to reschedule patients follow up to a later date due to Miracle being out and the patient going to [place]. She is requesting a refill prescription on the Ibu 600mg for her trip.

## 2023-04-20 RX ORDER — IBUPROFEN 600 MG/1
600 TABLET ORAL EVERY 6 HOURS PRN
Qty: 60 TABLET | Refills: 0 | Status: SHIPPED | OUTPATIENT
Start: 2023-04-20 | End: 2023-06-27 | Stop reason: SDUPTHER

## 2023-04-21 ENCOUNTER — CLINICAL SUPPORT (OUTPATIENT)
Dept: REHABILITATION | Facility: HOSPITAL | Age: 68
End: 2023-04-21
Payer: MEDICARE

## 2023-04-21 DIAGNOSIS — M50.30 DDD (DEGENERATIVE DISC DISEASE), CERVICAL: Primary | ICD-10-CM

## 2023-04-21 PROCEDURE — 97140 MANUAL THERAPY 1/> REGIONS: CPT | Mod: PN,CQ

## 2023-04-21 NOTE — PROGRESS NOTES
Physical Therapy Daily Note     Name: Edith Rodriguez [facility] Number: [id number]  Diagnosis:   Encounter Diagnosis   Name Primary?    DDD (degenerative disc disease), cervical Yes         Physician: Miracle Medina NP  Precautions: falls   Visit #: 12 of 12 (not including eval) (3 visits in [facility])  PTA Visit #:  1/5  Time In:  1: 22 pm  Time Out: 2:15 pm  FOTO:  2/3  - last score update 4/18 : 39% limitation     Subjective     Pt reports: I'm feeling much better but it still can get tight/painful.    Pain Scale: Edith rates pain on a scale of 0-10 to be 2-3 currently (with certain movements).    Objective       Cervical Range of Motion: Active  No pain noted after manual tx.     Degrees Pain   Flexion 40 > 60     Extension 44 > 58    Left Rotation 60 >68     Right Rotation 42 >60    Left Side Bending 20     Right Side Bending 10          Edith received individual therapeutic exercises to develop strength, endurance, ROM, flexibility, and posture for 0 minutes including:      Edith received the following manual therapy techniques: Myofacial release, soft tissue and joint mobilization   were applied to the: neck for 53 minutes including:  Myofascial Release   Bilateral pterygoids   Bilateral scalenes   Bilateral upper traps   Sub occipitals   Cranial lift (getting better)    DNP today  LF-ES per protocol for scalenes and SCM on the right; utilized 30/40 mm needles for two points on clavicular head of SCM, one point on sternal head/anterior scalene; right masseter, right middle scalene; Clockwise winding of needle for increased tissue grasp; Intensity of stimulation adjusted to patient tolerance, good rhythmical contraction of tissue noted; stimulation unit set on 6HZ, 0% scam. Needles left in-situ x 15 mins.   Removed needles and assisted patient to sitting at edge of table;   Provided moist heat to cervical spine x 12 mins.     Written Home  Exercises Provided: reviewed with patient   Pt demo good understanding of the education provided. Edith demonstrated good return demonstration of activities.     Education provided re:  Edith verbalized good understanding of education provided.   No spiritual or educational barriers to learning provided    Assessment   Patient demonstrating significant improvement in cervical AROM without increase in her pain today; Edith is still very reluctant to do any lifting with her RUE as she feels her muscles tighten in her neck and doesn't want to aggravate anything. Will need to address functional movements with Ue's when she returns from her trip.     This is a 67 y.o. female referred to outpatient physical therapy and presents with a medical diagnosis of R neck pain  and demonstrates limitations as described in the problem list. Pt prognosis is Good. Pt will continue to benefit from skilled outpatient physical therapy to address the deficits listed in the problem list, provide pt/family education and to maximize pt's level of independence in the home and community environment.     Goals as follows:  Short Term Goals: 3 weeks   Reduction in max level daily pain to 5/10 for improved ability to perform daily activities  > MET   Compliant with HEP > MET      Long Term Goals: 6 weeks   Reduction in pain levels to no more than 2/10 for return to painfree performance of ADL's  > Improving   Improvement in cervical AROM to WFL without increased pain/compensation > Improving   Able to lift 2# from waist to top cabinet with RUE without increased pain/compensation   Able to perform heavier activities without increased pain/compensation.  FOTO limitation score improved to 44%  > Exceeded 39%   Independent with HEP for continued improvement in function.         Plan     Continue with established Plan of Care towards PT goals.    Therapist: Osmani Fuller, PTA  4/21/2023

## 2023-05-04 ENCOUNTER — CLINICAL SUPPORT (OUTPATIENT)
Dept: REHABILITATION | Facility: HOSPITAL | Age: 68
End: 2023-05-04
Payer: MEDICARE

## 2023-05-04 ENCOUNTER — TELEPHONE (OUTPATIENT)
Dept: PAIN MEDICINE | Facility: CLINIC | Age: 68
End: 2023-05-04
Payer: MEDICARE

## 2023-05-04 DIAGNOSIS — M79.18 MYOFASCIAL PAIN SYNDROME, CERVICAL: ICD-10-CM

## 2023-05-04 DIAGNOSIS — M54.2 NECK PAIN: Primary | ICD-10-CM

## 2023-05-04 PROCEDURE — 97110 THERAPEUTIC EXERCISES: CPT | Mod: PN

## 2023-05-04 PROCEDURE — 97140 MANUAL THERAPY 1/> REGIONS: CPT | Mod: PN

## 2023-05-04 NOTE — PROGRESS NOTES
Physical Therapy Daily Note     Name: Edith Rodriguez [facility] Number: [id number]  Diagnosis:   Encounter Diagnoses   Name Primary?    Neck pain Yes    Myofascial pain syndrome, cervical          Physician: Miracle Medina NP  Precautions: falls   Visit #: 13 (not including eval) (3 visits in [facility])  Plan of Care Expiration: 6/8/2023    PTA Visit #:  0/5    Time In:  10:00   Time Out:    11:00  am  FOTO:  2/3  - last score update 4/18 : 39% limitation     Subjective     Pt reports: She is back from her son's wedding in [place] [facility]; feels like she did really well; noted that her neck and shoulders got tired/tight carrying her backpack; still noting some tightness/tenderness right side of neck into anterior aspect     Pain Scale: Edith rates pain on a scale of 0-10 to be 4-5 currently (with certain movements).     Objective       Cervical Range of Motion: Active  No pain noted after manual tx.     Degrees Pain   Flexion 40 > 60     Extension 44 > 58    Left Rotation 60 >68     Right Rotation 42 >60    Left Side Bending 20     Right Side Bending 10          Edith received individual therapeutic exercises to develop strength, endurance, ROM, flexibility, and posture for 10  minutes including:  Scapular retractions with tband resistance - perform high, middle and low positions in door - cueing to avoid shoulder hiking given, improved activation of rhomboids with movement.     Edith received the following manual therapy techniques: Myofacial release, soft tissue and joint mobilization   were applied to the: neck for 45 minutes including:Myofacial release, soft tissue and joint mobilization    Supine: sub-occipital inhibition/release; OA flexion to address restriction; lateral gliding with rotation TP of C3 to right; lateral gliding Right C2; upslides facets bilaterally from T6 to upper cervical; upper trap/levator MET on right/left to address tissue  tightness; Depression first rib on right with breathing techniques; internal rotation right clavicle at SC/AC.   Light cranial lift with hold.      DNP today  LF-ES per protocol for scalenes and SCM on the right; utilized 30/40 mm needles for two points on clavicular head of SCM, one point on sternal head/anterior scalene; right masseter, right middle scalene; Clockwise winding of needle for increased tissue grasp; Intensity of stimulation adjusted to patient tolerance, good rhythmical contraction of tissue noted; stimulation unit set on 6HZ, 0% scam. Needles left in-situ x 15 mins.   Removed needles and assisted patient to sitting at edge of table;   Provided moist heat to cervical spine x 12 mins.     Written Home Exercises Provided: reviewed with patient   -Scapular retractions - given tband for home   Pt demo good understanding of the education provided. Edith demonstrated good return demonstration of activities.     Education provided re:  Edith verbalized good understanding of education provided.   No spiritual or educational barriers to learning provided    Assessment   Patient continues to demonstrate  improvement in cervical AROM without increase in her pain following her trip to [place]; Discussed need to start exercising - building up strength in upper quarter to support her neck and thoracic spine - this will help alleviate continued stress in upper trap. Patient was agreeable to this.      This is a 67 y.o. female referred to outpatient physical therapy and presents with a medical diagnosis of R neck pain  and demonstrates limitations as described in the problem list. Pt prognosis is Good. Pt will continue to benefit from skilled outpatient physical therapy to address the deficits listed in the problem list, provide pt/family education and to maximize pt's level of independence in the home and community environment.     Goals as follows:  Short Term Goals: 3 weeks   Reduction in max level daily  pain to 5/10 for improved ability to perform daily activities  > MET   Compliant with HEP > MET      Long Term Goals: 6 weeks   Reduction in pain levels to no more than 2/10 for return to painfree performance of ADL's  > Improving   Improvement in cervical AROM to WFL without increased pain/compensation > Improving   Able to lift 2# from waist to top cabinet with RUE without increased pain/compensation   Able to perform heavier activities without increased pain/compensation.  FOTO limitation score improved to 44%  > Exceeded 39%   Independent with HEP for continued improvement in function.         Plan     Continue with established Plan of Care towards PT goals.    Therapist: Clair Bautista, PT  5/4/2023

## 2023-05-08 ENCOUNTER — PATIENT MESSAGE (OUTPATIENT)
Dept: PAIN MEDICINE | Facility: CLINIC | Age: 68
End: 2023-05-08

## 2023-05-08 ENCOUNTER — CLINICAL SUPPORT (OUTPATIENT)
Dept: REHABILITATION | Facility: HOSPITAL | Age: 68
End: 2023-05-08
Payer: MEDICARE

## 2023-05-08 ENCOUNTER — OFFICE VISIT (OUTPATIENT)
Dept: PAIN MEDICINE | Facility: CLINIC | Age: 68
End: 2023-05-08
Payer: MEDICARE

## 2023-05-08 VITALS
BODY MASS INDEX: 20.99 KG/M2 | WEIGHT: 126 LBS | HEART RATE: 62 BPM | DIASTOLIC BLOOD PRESSURE: 76 MMHG | SYSTOLIC BLOOD PRESSURE: 116 MMHG | HEIGHT: 65 IN

## 2023-05-08 DIAGNOSIS — M50.30 DDD (DEGENERATIVE DISC DISEASE), CERVICAL: ICD-10-CM

## 2023-05-08 DIAGNOSIS — M79.18 MYOFASCIAL PAIN SYNDROME, CERVICAL: Primary | ICD-10-CM

## 2023-05-08 DIAGNOSIS — M47.892 OTHER SPONDYLOSIS, CERVICAL REGION: ICD-10-CM

## 2023-05-08 DIAGNOSIS — M47.812 ARTHROPATHY OF CERVICAL FACET JOINT: Primary | ICD-10-CM

## 2023-05-08 PROCEDURE — 97140 MANUAL THERAPY 1/> REGIONS: CPT | Mod: PN

## 2023-05-08 PROCEDURE — 99999 PR PBB SHADOW E&M-EST. PATIENT-LVL IV: CPT | Mod: PBBFAC,,,

## 2023-05-08 PROCEDURE — 99999 PR PBB SHADOW E&M-EST. PATIENT-LVL IV: ICD-10-PCS | Mod: PBBFAC,,,

## 2023-05-08 PROCEDURE — 99214 OFFICE O/P EST MOD 30 MIN: CPT | Mod: PBBFAC,PO

## 2023-05-08 PROCEDURE — 97110 THERAPEUTIC EXERCISES: CPT | Mod: PN

## 2023-05-08 PROCEDURE — 99214 PR OFFICE/OUTPT VISIT, EST, LEVL IV, 30-39 MIN: ICD-10-PCS | Mod: S$PBB,,,

## 2023-05-08 PROCEDURE — 99214 OFFICE O/P EST MOD 30 MIN: CPT | Mod: S$PBB,,,

## 2023-05-08 NOTE — H&P (VIEW-ONLY)
This note was completed with dictation software and grammatical errors may exist.    Referring Physician: No ref. provider found    PCP: Shanice Yang MD      CC: neck pain    INTERVAL HPI:   Edith Jackson is a 67 y.o. female established patient, for the management of neck pain.  The patient is s/p Right C6-7, C7-T1 transforaminal epidural steroid injection under fluoroscopy  on 3/17/23 and reports of no relief.  The patient reports of right sided neck pain that radiates into her shoulders. The patient also endorses anterior neck pain.  Pain worsens with sitting, standing, bending, coughing walking.  Pain improves with rest.  She takes NSAIDs with mild benefits.  She has completed physical therapy with some relief. She denies numbness and tingling. The patient denies of any significant changes in her health since her last appointment. The patient also denies of any changes in the character of her pain since her last appointment.  Patient denies of any urinary/fecal incontinence, saddle anesthesia, or weakness.      HPI:   Edith Jackson is a 67 y.o. female referred to us for neck pain.  Neck pain has gradually worsened over the past 4 months.  No recent traumatic incident.  She has constant aching, burning, grabbing, sharp, deep pain in her posterior neck pain radiates to her bilateral shoulders.  Pain worsens with sitting, standing, bending, coughing walking.  Pain improves with rest.  She takes NSAIDs with mild benefits.  She has tried physical therapy with minimal benefit.  She underwent trigger point injections recently with minimal benefit.  She recently had MRI of the cervical spine.  He was evaluated by spine surgery referred to us for further interventional procedures.  She denies any worsening weakness.  No bowel bladder changes.    Interventional Procedures:  3/17/23: Right C6-7, C7-T1 transforaminal epidural steroid injection under fluoroscopy - no relief.   1/13/2023: C7-T1  cervical interlaminar epidural steroid injection under utilizing fluoroscopy- minimal relief.     ROS:  CONSTITUTIONAL: No fevers, chills, night sweats, wt. loss, appetite changes  SKIN: no rashes or itching  ENT: No headaches, head trauma, vision changes, or eye pain  LYMPH NODES: None noticed   CV: No chest pain, palpitations.   RESP: No shortness of breath, dyspnea on exertion, cough, wheezing, or hemoptysis  GI: No nausea, emesis, diarrhea, constipation, melena, hematochezia, pain.    : No dysuria, hematuria, urgency, or frequency   HEME: No easy bruising, bleeding problems  PSYCHIATRIC: No depression, anxiety, psychosis, hallucinations.  NEURO: No seizures, memory loss, dizziness or difficulty sleeping  MSK:  Positive HPI      Past Medical History:   Diagnosis Date    Arthritis     Asthma     H/O: pneumonia     Hypertension     Hypotension, iatrogenic     post-op    Osteopenia     Post-menopausal bleeding 01/2023    Respiratory distress     Thyroid disease     hypo     Past Surgical History:   Procedure Laterality Date    AUGMENTATION OF BREAST Bilateral     removal    BREAST RECONSTRUCTION      COLONOSCOPY N/A 03/17/2022    Procedure: COLONOSCOPY;  Surgeon: Alessandra Springer MD;  Location: [facility] ENDO;  Service: General;  Laterality: N/A;    EPIDURAL STEROID INJECTION INTO CERVICAL SPINE N/A 01/13/2023    Procedure: Injection-steroid-epidural-cervical;  Surgeon: Larry Thomason MD;  Location: [facility] OR;  Service: Pain Management;  Laterality: N/A;  C7-T1    HIP REPLACEMENT ARTHROPLASTY Right     HYSTEROSCOPY WITH DILATION AND CURETTAGE OF UTERUS N/A 1/30/2023    Procedure: HYSTEROSCOPY, WITH DILATION AND CURETTAGE OF UTERUS;possible myosure;  Surgeon: Carie Dwyer MD;  Location: [facility];  Service: OB/GYN;  Laterality: N/A;    JOINT REPLACEMENT      TRANSFORAMINAL EPIDURAL INJECTION OF STEROID Right 3/17/2023    Procedure: Injection,steroid,cervical,transforaminal,   Right C6-C7, C7-T1;  Surgeon: Larry Thomason MD;   "Location: [facility] OR;  Service: Pain Management;  Laterality: Right;     Family History   Problem Relation Age of Onset    Heart disease Mother     Diabetes Mother     Stroke Mother     Heart attack Mother     Thyroid disease Mother     Heart attack Father     Breast cancer Neg Hx     Ovarian cancer Neg Hx      Social History     Socioeconomic History    Marital status: Single   Tobacco Use    Smoking status: Never    Smokeless tobacco: Never   Substance and Sexual Activity    Alcohol use: Yes     Alcohol/week: 2.0 standard drinks     Types: 2 Glasses of wine per week     Comment: socially    Drug use: Never         Medications/Allergies: See med card    Vitals:    05/08/23 1006   BP: 116/76   Pulse: 62   Weight: 57.2 kg (126 lb)   Height: 5' 4.5" (1.638 m)   PainSc:   5           Physical exam:    GENERAL: A and O x3, the patient appears well groomed and is in no acute distress.  Skin: No rashes or obvious lesions  HEENT: normocephalic, atraumatic  CARDIOVASCULAR:  Palpable peripheral pulses  LUNGS: easy work of breathing  ABDOMEN: soft, nontender   UPPER EXTREMITIES: Normal alignment, normal range of motion, no atrophy, no skin changes,  hair growth and nail growth normal and equal bilaterally. No swelling, no tenderness.    LOWER EXTREMITIES:  Normal alignment, normal range of motion, no atrophy, no skin changes,  hair growth and nail growth normal and equal bilaterally. No swelling, no tenderness.  CERVICAL SPINE:  Cervical spine: ROM is limited in flexion, extension and lateral rotation with moderate increased pain.  Spurling's maneuver causes  neck pain to either side.  Myofascial exam:  Tenderness to palpation across cervical paraspinous region bilaterally. R>L.     MENTAL STATUS: normal orientation, speech, language, and fund of knowledge for social situation.  Emotional state appropriate.    MOTOR: Tone and bulk: normal bilateral upper and lower Strength: normal   SENSATION: Light touch and pinprick intact " bilaterally  REFLEXES: normal, symmetric, nonbrisk.  Toes down, no clonus. No hoffmans.  GAIT: normal rise, base, steps, and arm swing.        Imaging:  MRI C-spine 11/2022  Impression:     1. Reversal of normal cervical lordosis.  2. Marrow edema along the right C2-3 and left C3-4 facets, presumably degenerative.  3. Multilevel cervical degenerative change including left-sided disc extrusions at C3-4 and C5-6.  Degenerative findings contribute to neural foraminal narrowing severe on the left at C3-4 and C5-6.  4. No high-grade spinal canal stenosis.    Assessment:  Edith Jackson is a 67 y.o.  female established patient for the management of neck pain. The patient is s/p Cervical FRED on 3/17/23 with no relief. Patient localizes her neck pain to the right side of her neck with radiation into her shoulder blade.  MRI significant for multilevel neural foraminal stenosis and DDD. Treatment plan outlined below.    1. Arthropathy of cervical facet joint    2. DDD (degenerative disc disease), cervical    3. Other spondylosis, cervical region          Plan:  I have stressed the importance of physical activity and exercise to improve overall health  Reviewed pertinent imaging and records with patient  Schedule for right Cervical MBB/RFA C3,C4,C5,C6  Ordered TENS unit, recommended usage by PT.    Consider botox for cervical dystonia.    Follow up after procedure  Miracle Medina NP

## 2023-05-08 NOTE — PROGRESS NOTES
This note was completed with dictation software and grammatical errors may exist.    Referring Physician: No ref. provider found    PCP: Shanice Yang MD      CC: neck pain    INTERVAL HPI:   Edith Jackson is a 67 y.o. female established patient, for the management of neck pain.  The patient is s/p Right C6-7, C7-T1 transforaminal epidural steroid injection under fluoroscopy  on 3/17/23 and reports of no relief.  The patient reports of right sided neck pain that radiates into her shoulders. The patient also endorses anterior neck pain.  Pain worsens with sitting, standing, bending, coughing walking.  Pain improves with rest.  She takes NSAIDs with mild benefits.  She has completed physical therapy with some relief. She denies numbness and tingling. The patient denies of any significant changes in her health since her last appointment. The patient also denies of any changes in the character of her pain since her last appointment.  Patient denies of any urinary/fecal incontinence, saddle anesthesia, or weakness.      HPI:   Edith Jackson is a 67 y.o. female referred to us for neck pain.  Neck pain has gradually worsened over the past 4 months.  No recent traumatic incident.  She has constant aching, burning, grabbing, sharp, deep pain in her posterior neck pain radiates to her bilateral shoulders.  Pain worsens with sitting, standing, bending, coughing walking.  Pain improves with rest.  She takes NSAIDs with mild benefits.  She has tried physical therapy with minimal benefit.  She underwent trigger point injections recently with minimal benefit.  She recently had MRI of the cervical spine.  He was evaluated by spine surgery referred to us for further interventional procedures.  She denies any worsening weakness.  No bowel bladder changes.    Interventional Procedures:  3/17/23: Right C6-7, C7-T1 transforaminal epidural steroid injection under fluoroscopy - no relief.   1/13/2023: C7-T1  cervical interlaminar epidural steroid injection under utilizing fluoroscopy- minimal relief.     ROS:  CONSTITUTIONAL: No fevers, chills, night sweats, wt. loss, appetite changes  SKIN: no rashes or itching  ENT: No headaches, head trauma, vision changes, or eye pain  LYMPH NODES: None noticed   CV: No chest pain, palpitations.   RESP: No shortness of breath, dyspnea on exertion, cough, wheezing, or hemoptysis  GI: No nausea, emesis, diarrhea, constipation, melena, hematochezia, pain.    : No dysuria, hematuria, urgency, or frequency   HEME: No easy bruising, bleeding problems  PSYCHIATRIC: No depression, anxiety, psychosis, hallucinations.  NEURO: No seizures, memory loss, dizziness or difficulty sleeping  MSK:  Positive HPI      Past Medical History:   Diagnosis Date    Arthritis     Asthma     H/O: pneumonia     Hypertension     Hypotension, iatrogenic     post-op    Osteopenia     Post-menopausal bleeding 01/2023    Respiratory distress     Thyroid disease     hypo     Past Surgical History:   Procedure Laterality Date    AUGMENTATION OF BREAST Bilateral     removal    BREAST RECONSTRUCTION      COLONOSCOPY N/A 03/17/2022    Procedure: COLONOSCOPY;  Surgeon: Alessandra Springer MD;  Location: [facility] ENDO;  Service: General;  Laterality: N/A;    EPIDURAL STEROID INJECTION INTO CERVICAL SPINE N/A 01/13/2023    Procedure: Injection-steroid-epidural-cervical;  Surgeon: Larry Thomason MD;  Location: [facility] OR;  Service: Pain Management;  Laterality: N/A;  C7-T1    HIP REPLACEMENT ARTHROPLASTY Right     HYSTEROSCOPY WITH DILATION AND CURETTAGE OF UTERUS N/A 1/30/2023    Procedure: HYSTEROSCOPY, WITH DILATION AND CURETTAGE OF UTERUS;possible myosure;  Surgeon: Carie Dwyer MD;  Location: [facility];  Service: OB/GYN;  Laterality: N/A;    JOINT REPLACEMENT      TRANSFORAMINAL EPIDURAL INJECTION OF STEROID Right 3/17/2023    Procedure: Injection,steroid,cervical,transforaminal,   Right C6-C7, C7-T1;  Surgeon: Larry Thomason MD;   "Location: [facility] OR;  Service: Pain Management;  Laterality: Right;     Family History   Problem Relation Age of Onset    Heart disease Mother     Diabetes Mother     Stroke Mother     Heart attack Mother     Thyroid disease Mother     Heart attack Father     Breast cancer Neg Hx     Ovarian cancer Neg Hx      Social History     Socioeconomic History    Marital status: Single   Tobacco Use    Smoking status: Never    Smokeless tobacco: Never   Substance and Sexual Activity    Alcohol use: Yes     Alcohol/week: 2.0 standard drinks     Types: 2 Glasses of wine per week     Comment: socially    Drug use: Never         Medications/Allergies: See med card    Vitals:    05/08/23 1006   BP: 116/76   Pulse: 62   Weight: 57.2 kg (126 lb)   Height: 5' 4.5" (1.638 m)   PainSc:   5           Physical exam:    GENERAL: A and O x3, the patient appears well groomed and is in no acute distress.  Skin: No rashes or obvious lesions  HEENT: normocephalic, atraumatic  CARDIOVASCULAR:  Palpable peripheral pulses  LUNGS: easy work of breathing  ABDOMEN: soft, nontender   UPPER EXTREMITIES: Normal alignment, normal range of motion, no atrophy, no skin changes,  hair growth and nail growth normal and equal bilaterally. No swelling, no tenderness.    LOWER EXTREMITIES:  Normal alignment, normal range of motion, no atrophy, no skin changes,  hair growth and nail growth normal and equal bilaterally. No swelling, no tenderness.  CERVICAL SPINE:  Cervical spine: ROM is limited in flexion, extension and lateral rotation with moderate increased pain.  Spurling's maneuver causes  neck pain to either side.  Myofascial exam:  Tenderness to palpation across cervical paraspinous region bilaterally. R>L.     MENTAL STATUS: normal orientation, speech, language, and fund of knowledge for social situation.  Emotional state appropriate.    MOTOR: Tone and bulk: normal bilateral upper and lower Strength: normal   SENSATION: Light touch and pinprick intact " bilaterally  REFLEXES: normal, symmetric, nonbrisk.  Toes down, no clonus. No hoffmans.  GAIT: normal rise, base, steps, and arm swing.        Imaging:  MRI C-spine 11/2022  Impression:     1. Reversal of normal cervical lordosis.  2. Marrow edema along the right C2-3 and left C3-4 facets, presumably degenerative.  3. Multilevel cervical degenerative change including left-sided disc extrusions at C3-4 and C5-6.  Degenerative findings contribute to neural foraminal narrowing severe on the left at C3-4 and C5-6.  4. No high-grade spinal canal stenosis.    Assessment:  Edith Jackson is a 67 y.o.  female established patient for the management of neck pain. The patient is s/p Cervical FRED on 3/17/23 with no relief. Patient localizes her neck pain to the right side of her neck with radiation into her shoulder blade.  MRI significant for multilevel neural foraminal stenosis and DDD. Treatment plan outlined below.    1. Arthropathy of cervical facet joint    2. DDD (degenerative disc disease), cervical    3. Other spondylosis, cervical region          Plan:  I have stressed the importance of physical activity and exercise to improve overall health  Reviewed pertinent imaging and records with patient  Schedule for right Cervical MBB/RFA C3,C4,C5,C6  Ordered TENS unit, recommended usage by PT.    Consider botox for cervical dystonia.    Follow up after procedure  Miracle Medina NP

## 2023-05-08 NOTE — PROGRESS NOTES
Physical Therapy Daily Note     Name: Edith Rodriguez [facility] Number: [id number]  Diagnosis:   Encounter Diagnosis   Name Primary?    Myofascial pain syndrome, cervical Yes         Physician: Miracle Medina NP  Precautions: falls   Visit #: 14 (not including eval) (3 visits in [facility])  Plan of Care Expiration: 6/8/2023    PTA Visit #:  0/5    Time In:  1:30 pm   Time Out:    2:30 pm   FOTO:  2/3  - last score update 4/18 : 39% limitation     Subjective     Pt reports: Patient went to see pain management this morning; is going to have trial Ablation procedure for cervical pain on 5/26. She will need two successful trials in order to have the actual RF ablation procedure: Cervical MBB/RFA C3,C4,C5,C6    Pain Scale: Edith rates pain on a scale of 0-10 to be 4-5 currently (with certain movements).     Objective       Cervical Range of Motion: Active  No pain noted after manual tx.     Degrees Pain   Flexion 40 > 60     Extension 44 > 58    Left Rotation 60 >68     Right Rotation 42 >60    Left Side Bending 20     Right Side Bending 10          Edith received individual therapeutic exercises to develop strength, endurance, ROM, flexibility, and posture for 10  minutes including:  Scapular retractions with tband resistance - perform high, middle and low positions in door - cueing to avoid shoulder hiking given, improved activation of rhomboids with movement.     Edith received the following manual therapy techniques: Myofacial release, soft tissue and joint mobilization   were applied to the: neck for 45 minutes including:Myofacial release, soft tissue and joint mobilization    Supine: sub-occipital inhibition/release; OA flexion to address restriction; lateral gliding with rotation TP of C3 to right; lateral gliding Right C2; upslides facets bilaterally from T6 to upper cervical; upper trap/levator MET on right/left to address tissue tightness;  Depression first rib on right with breathing techniques; internal rotation right clavicle at SC/AC.   Light cranial lift with hold.      DNP today  LF-ES per protocol for scalenes and SCM on the right; utilized 30/40 mm needles for two points on clavicular head of SCM, one point on sternal head/anterior scalene; right masseter, right middle scalene; Clockwise winding of needle for increased tissue grasp; Intensity of stimulation adjusted to patient tolerance, good rhythmical contraction of tissue noted; stimulation unit set on 6HZ, 0% scam. Needles left in-situ x 15 mins.   Removed needles and assisted patient to sitting at edge of table;   Provided moist heat to cervical spine x 12 mins.     Written Home Exercises Provided: reviewed with patient   -Scapular retractions - given tband for home   Pt demo good understanding of the education provided. Edith demonstrated good return demonstration of activities.     Education provided re:  -starting a light exercise program for upper quarter - needs support for her neck/spine   -getting a cervical pillow for improved sleep/reduction in neck pain    Edith verbalized good understanding of education provided.   No spiritual or educational barriers to learning provided    Assessment   Patient continues to demonstrate  improvement in cervical AROM without increase in her pain. She reports feels much better after therapy, but it only lasts for 3-4 days.  Again discussed need to start exercising - building up strength in upper quarter to support her neck and thoracic spine, also suggested finding a yoga class to try for stretching/small mm strengthening and control- this will help alleviate continued stress in upper trap. Patient was agreeable to this.  Discussed getting a Gina Cervical Roll pillow for trial - needs to find something to help alleviate neck pain during sleep and in the mornings.     This is a 67 y.o. female referred to outpatient physical therapy and  presents with a medical diagnosis of R neck pain  and demonstrates limitations as described in the problem list. Pt prognosis is Good. Pt will continue to benefit from skilled outpatient physical therapy to address the deficits listed in the problem list, provide pt/family education and to maximize pt's level of independence in the home and community environment.     Goals as follows:  Short Term Goals: 3 weeks   Reduction in max level daily pain to 5/10 for improved ability to perform daily activities  > MET   Compliant with HEP > MET      Long Term Goals: 6 weeks   Reduction in pain levels to no more than 2/10 for return to painfree performance of ADL's  > Improving   Improvement in cervical AROM to WFL without increased pain/compensation > Improving   Able to lift 2# from waist to top cabinet with RUE without increased pain/compensation   Able to perform heavier activities without increased pain/compensation.  FOTO limitation score improved to 44%  > Exceeded 39%   Independent with HEP for continued improvement in function.         Plan     Continue with established Plan of Care towards PT goals.    Therapist: Clair Bautista, PT  5/8/2023

## 2023-05-12 ENCOUNTER — CLINICAL SUPPORT (OUTPATIENT)
Dept: REHABILITATION | Facility: HOSPITAL | Age: 68
End: 2023-05-12
Payer: MEDICARE

## 2023-05-12 DIAGNOSIS — M79.18 MYOFASCIAL PAIN SYNDROME, CERVICAL: Primary | ICD-10-CM

## 2023-05-12 PROCEDURE — 97110 THERAPEUTIC EXERCISES: CPT | Mod: PN,CQ

## 2023-05-12 PROCEDURE — 97140 MANUAL THERAPY 1/> REGIONS: CPT | Mod: PN,CQ

## 2023-05-12 NOTE — PROGRESS NOTES
Physical Therapy Daily Note     Name: Edith Rodriguez [facility] Number: [id number]  Diagnosis:   Encounter Diagnosis   Name Primary?    Myofascial pain syndrome, cervical Yes         Physician: Miracle Medina NP  Precautions: falls   Visit #: 15 (not including eval) (3 visits in [facility])  Plan of Care Expiration: 6/8/2023    PTA Visit #:  1/5    Time In:  11:00 am  Time Out:    11:53 am  FOTO:  2/3  - last score update 4/18 : 39% limitation     Subjective     Pt reports: waking up this morning with pain but after a walk and an IB profen, it feels ok right now.    Pain Scale: Edith rates pain on a scale of 0-10 to be 4-5 currently (with certain movements).     Objective       Cervical Range of Motion: Active  No pain noted after manual tx.     Degrees Pain   Flexion 40 > 60     Extension 44 > 58    Left Rotation 60 >68     Right Rotation 42 >60    Left Side Bending 20     Right Side Bending 10          Edith received individual therapeutic exercises to develop strength, endurance, ROM, flexibility, and posture for 13  minutes including:  Scapular retractions with tband resistance - perform high, middle and low positions in door - cueing to avoid shoulder hiking given, improved activation of rhomboids with movement.  2 x 10        Edith received the following manual therapy techniques: x 40 min  Myofascial Release   Bilateral pterygoids   Sub occipitals    Upper trap/scalenes   Cranial lift    DNP today  LF-ES per protocol for scalenes and SCM on the right; utilized 30/40 mm needles for two points on clavicular head of SCM, one point on sternal head/anterior scalene; right masseter, right middle scalene; Clockwise winding of needle for increased tissue grasp; Intensity of stimulation adjusted to patient tolerance, good rhythmical contraction of tissue noted; stimulation unit set on 6HZ, 0% scam. Needles left in-situ x 15 mins.   Removed needles and  assisted patient to sitting at edge of table;   Provided moist heat to cervical spine x 12 mins.     Written Home Exercises Provided: reviewed with patient   -Scapular retractions - given tband for home   Pt demo good understanding of the education provided. Edith demonstrated good return demonstration of activities.     Education provided re:  -starting a light exercise program for upper quarter - needs support for her neck/spine   -getting a cervical pillow for improved sleep/reduction in neck pain    Edith verbalized good understanding of education provided.   No spiritual or educational barriers to learning provided    Assessment   Patient continues to demonstrate  improvement in cervical AROM without increase in her pain. She reports feels much better after therapy, but it only lasts for 3-4 days.  Again discussed need to start exercising - building up strength in upper quarter to support her neck and thoracic spine, also suggested finding a yoga class to try for stretching/small mm strengthening and control- this will help alleviate continued stress in upper trap. Patient was agreeable to this.  Discussed getting a Gina Cervical Roll pillow for trial - needs to find something to help alleviate neck pain during sleep and in the mornings.     This is a 67 y.o. female referred to outpatient physical therapy and presents with a medical diagnosis of R neck pain  and demonstrates limitations as described in the problem list. Pt prognosis is Good. Pt will continue to benefit from skilled outpatient physical therapy to address the deficits listed in the problem list, provide pt/family education and to maximize pt's level of independence in the home and community environment.     Goals as follows:  Short Term Goals: 3 weeks   Reduction in max level daily pain to 5/10 for improved ability to perform daily activities  > MET   Compliant with HEP > MET      Long Term Goals: 6 weeks   Reduction in pain levels to  no more than 2/10 for return to painfree performance of ADL's  > Improving   Improvement in cervical AROM to WFL without increased pain/compensation > Improving   Able to lift 2# from waist to top cabinet with RUE without increased pain/compensation   Able to perform heavier activities without increased pain/compensation.  FOTO limitation score improved to 44%  > Exceeded 39%   Independent with HEP for continued improvement in function.         Plan     Continue with established Plan of Care towards PT goals.    Therapist: Osmani Fuller, PTA  5/12/2023

## 2023-05-15 ENCOUNTER — CLINICAL SUPPORT (OUTPATIENT)
Dept: REHABILITATION | Facility: HOSPITAL | Age: 68
End: 2023-05-15
Payer: MEDICARE

## 2023-05-15 DIAGNOSIS — M79.18 MYOFASCIAL PAIN SYNDROME, CERVICAL: Primary | ICD-10-CM

## 2023-05-15 PROCEDURE — 97110 THERAPEUTIC EXERCISES: CPT | Mod: PN

## 2023-05-15 PROCEDURE — 97140 MANUAL THERAPY 1/> REGIONS: CPT | Mod: PN

## 2023-05-15 NOTE — PROGRESS NOTES
Physical Therapy Daily Note     Name: Edith Rodriguez [facility] Number: [id number]  Diagnosis:   Encounter Diagnosis   Name Primary?    Myofascial pain syndrome, cervical Yes         Physician: Miracle Medina NP  Precautions: falls   Visit #: 16  (not including eval) (3 visits in [facility])  Plan of Care Expiration: 6/8/2023    PTA Visit #:  0 /5    Time In:  11:00 am  Time Out:    11:53 am  FOTO:  2/3  - last score update 4/18 : 39% limitation     Subjective     Pt reports: I didn't have time to do my shoulder exercises over the weekend, I was so busy.  I do feel like my neck is better today though, not nearly as sore.     Pain Scale: Edith rates pain on a scale of 0-10 to be 2-3 currently (with certain movements).     Objective       Cervical Range of Motion: Active  No pain noted after manual tx.     Degrees Pain   Flexion 40 > 60     Extension 44 > 58    Left Rotation 60 >68     Right Rotation 42 >60    Left Side Bending 20     Right Side Bending 10          Edith received individual therapeutic exercises to develop strength, endurance, ROM, flexibility, and posture for 13  minutes including:  Scapular retractions with tband resistance - perform high, middle and low positions in door - cueing to avoid shoulder hiking given, improved activation of rhomboids with movement.  2 x 10        Edith received the following manual therapy techniques: x 40 min  Supine: sub-occipital inhibition/release; OA flexion to address restriction; lateral gliding with rotation TP of C3 to right; lateral gliding Right C2; upslides facets bilaterally from T6 to upper cervical; upper trap/levator MET on right/left to address tissue tightness; Depression first rib on right with breathing techniques; internal rotation right clavicle at SC/AC.   Light cranial lift with hold.         DNP today  LF-ES per protocol for scalenes and SCM on the right; utilized 30/40 mm needles  for two points on clavicular head of SCM, one point on sternal head/anterior scalene; right masseter, right middle scalene; Clockwise winding of needle for increased tissue grasp; Intensity of stimulation adjusted to patient tolerance, good rhythmical contraction of tissue noted; stimulation unit set on 6HZ, 0% scam. Needles left in-situ x 15 mins.   Removed needles and assisted patient to sitting at edge of table;   Provided moist heat to cervical spine x 12 mins.     Written Home Exercises Provided: reviewed with patient   -Scapular retractions - given tband for home   Pt demo good understanding of the education provided. Edith demonstrated good return demonstration of activities.     Education provided re:  -starting a light exercise program for upper quarter - needs support for her neck/spine   -getting a cervical pillow for improved sleep/reduction in neck pain    Edith verbalized good understanding of education provided.   No spiritual or educational barriers to learning provided    Assessment   Patient continues to demonstrate  improvement in cervical AROM without increase in her pain. She reports feels much better after therapy and continue to feel better for last 4-5 days;  Again discussed need to start exercising - building up strength in upper quarter to support her neck and thoracic spine, also suggested finding a yoga class to try for stretching/small mm strengthening and control- this will help alleviate continued stress in upper trap. Patient was agreeable to this.  Discussed getting a Gina Cervical Roll pillow for trial - needs to find something to help alleviate neck pain during sleep and in the mornings.     This is a 67 y.o. female referred to outpatient physical therapy and presents with a medical diagnosis of R neck pain  and demonstrates limitations as described in the problem list. Pt prognosis is Good. Pt will continue to benefit from skilled outpatient physical therapy to address  the deficits listed in the problem list, provide pt/family education and to maximize pt's level of independence in the home and community environment.     Goals as follows:  Short Term Goals: 3 weeks   Reduction in max level daily pain to 5/10 for improved ability to perform daily activities  > MET   Compliant with HEP > MET      Long Term Goals: 6 weeks   Reduction in pain levels to no more than 2/10 for return to painfree performance of ADL's  > Improving   Improvement in cervical AROM to WFL without increased pain/compensation > Improving   Able to lift 2# from waist to top cabinet with RUE without increased pain/compensation   Able to perform heavier activities without increased pain/compensation.  FOTO limitation score improved to 44%  > Exceeded 39%   Independent with HEP for continued improvement in function.         Plan     Continue with established Plan of Care towards PT goals.    Therapist: Clair Bautista, PT  5/15/2023

## 2023-05-16 ENCOUNTER — TELEPHONE (OUTPATIENT)
Dept: PAIN MEDICINE | Facility: CLINIC | Age: 68
End: 2023-05-16
Payer: MEDICARE

## 2023-05-16 NOTE — TELEPHONE ENCOUNTER
----- Message from Loyda Hamilton MA sent at 5/16/2023  8:31 AM CDT -----  Contact: pt  Pt states she has an appt with someone today   Pt upset not sure what is going on   Call back

## 2023-05-19 ENCOUNTER — CLINICAL SUPPORT (OUTPATIENT)
Dept: REHABILITATION | Facility: HOSPITAL | Age: 68
End: 2023-05-19
Payer: MEDICARE

## 2023-05-19 DIAGNOSIS — M79.18 MYOFASCIAL PAIN SYNDROME, CERVICAL: Primary | ICD-10-CM

## 2023-05-19 PROCEDURE — 97140 MANUAL THERAPY 1/> REGIONS: CPT | Mod: PN

## 2023-05-19 PROCEDURE — 97110 THERAPEUTIC EXERCISES: CPT | Mod: PN

## 2023-05-19 NOTE — PROGRESS NOTES
Physical Therapy Daily Note     Name: Edith Rodriguez [facility] Number: [id number]  Diagnosis:   Encounter Diagnosis   Name Primary?    Myofascial pain syndrome, cervical Yes         Physician: Miracle Medina NP  Precautions: falls   Visit #: 17  (not including eval) (3 visits in [facility])  Plan of Care Expiration: 6/8/2023    PTA Visit #:  0 /5    Time In:  11:00 am  Time Out:    11:53 am  FOTO:  2/3  - last score update 4/18 : 39% limitation     Subjective     Pt reports: I got a TENS unit the other day from pain management - have been using it in the evenings for past couple of days and feels that it is helpful. Noting reduction in her neck pain     Pain Scale: Edith rates pain on a scale of 0-10 to be 1-2  currently (with certain movements).     Objective       Cervical Range of Motion: Active  No pain noted after manual tx.     Degrees Pain   Flexion 40 > 60     Extension 44 > 58    Left Rotation 60 >68     Right Rotation 42 >60    Left Side Bending 20     Right Side Bending 10          Edith received individual therapeutic exercises to develop strength, endurance, ROM, flexibility, and posture for 13  minutes including:  Scapular retractions with tband resistance - perform high, middle and low positions in door - cueing to avoid shoulder hiking given, improved activation of rhomboids with movement.  2 x 10        Edith received the following manual therapy techniques: x 40 min  Supine: sub-occipital inhibition/release; OA flexion to address restriction; lateral gliding with rotation TP of C3 to right; lateral gliding Right C2; upslides facets bilaterally from T6 to upper cervical; upper trap/levator MET on right/left to address tissue tightness; Depression first rib on right with breathing techniques; internal rotation right clavicle at SC/AC.   Light cranial lift with hold.         DNP today  LF-ES per protocol for scalenes and SCM on the  right; utilized 30/40 mm needles for two points on clavicular head of SCM, one point on sternal head/anterior scalene; right masseter, right middle scalene; Clockwise winding of needle for increased tissue grasp; Intensity of stimulation adjusted to patient tolerance, good rhythmical contraction of tissue noted; stimulation unit set on 6HZ, 0% scam. Needles left in-situ x 15 mins.   Removed needles and assisted patient to sitting at edge of table;   Provided moist heat to cervical spine x 12 mins.     Written Home Exercises Provided: reviewed with patient   -Scapular retractions - given tband for home   Pt demo good understanding of the education provided. Edith demonstrated good return demonstration of activities.     Education provided re:  -starting a light exercise program for upper quarter - needs support for her neck/spine   -getting a cervical pillow for improved sleep/reduction in neck pain    Edith verbalized good understanding of education provided.   No spiritual or educational barriers to learning provided    Assessment   Patient continues to demonstrate  improvement in cervical AROM without increase in her pain. She reports feels much better after therapy and continue to feel better for last 4-5 days;  Again discussed need to start exercising - building up strength in upper quarter to support her neck and thoracic spine, also suggested finding a yoga class to try for stretching/small mm strengthening and control- this will help alleviate continued stress in upper trap. Patient was agreeable to this - is going to meet with  at gym for some instruction in use of machines that would be helpful to her.      This is a 67 y.o. female referred to outpatient physical therapy and presents with a medical diagnosis of R neck pain  and demonstrates limitations as described in the problem list. Pt prognosis is Good. Pt will continue to benefit from skilled outpatient physical therapy to address the  deficits listed in the problem list, provide pt/family education and to maximize pt's level of independence in the home and community environment.     Goals as follows:  Short Term Goals: 3 weeks   Reduction in max level daily pain to 5/10 for improved ability to perform daily activities  > MET   Compliant with HEP > MET      Long Term Goals: 6 weeks   Reduction in pain levels to no more than 2/10 for return to painfree performance of ADL's  > Improving   Improvement in cervical AROM to WFL without increased pain/compensation > Improving   Able to lift 2# from waist to top cabinet with RUE without increased pain/compensation   Able to perform heavier activities without increased pain/compensation.  FOTO limitation score improved to 44%  > Exceeded 39%   Independent with HEP for continued improvement in function.         Plan     Has one additional visit, then will break until after her cervical ablation and see how she does.     Therapist: Clair Bautista, PT  5/19/2023

## 2023-05-22 ENCOUNTER — CLINICAL SUPPORT (OUTPATIENT)
Dept: REHABILITATION | Facility: HOSPITAL | Age: 68
End: 2023-05-22
Payer: MEDICARE

## 2023-05-22 DIAGNOSIS — M79.18 MYOFASCIAL PAIN SYNDROME, CERVICAL: Primary | ICD-10-CM

## 2023-05-22 PROCEDURE — 97140 MANUAL THERAPY 1/> REGIONS: CPT | Mod: PN

## 2023-05-22 PROCEDURE — 97110 THERAPEUTIC EXERCISES: CPT | Mod: PN

## 2023-05-22 NOTE — PROGRESS NOTES
Physical Therapy Daily Note     Name: Edith Rodriguez [facility] Number: [id number]  Diagnosis:   Encounter Diagnosis   Name Primary?    Myofascial pain syndrome, cervical Yes         Physician: Miracle Medina NP  Precautions: falls   Visit #: 18  (not including eval) (3 visits in [facility])  Plan of Care Expiration: 6/8/2023    PTA Visit #:  0 /5    Time In:  10:00 am  Time Out:   11:00 am    FOTO:  2/3  - last score update 4/18 : 39% limitation     Subjective     Pt reports: I'm doing ok this morning; just a little tight on the right side.     Pain Scale: Edith rates pain on a scale of 0-10 to be 3  currently (with certain movements).     Objective       Cervical Range of Motion: updated 5/22/2023    Degrees Pain   Flexion 40 > 60     Extension 44 > 58    Left Rotation 60 >70     Right Rotation 42 >65    Left Side Bending 20     Right Side Bending 10          Edith received individual therapeutic exercises to develop strength, endurance, ROM, flexibility, and posture for 13  minutes including:  Scapular retractions with tband resistance - perform high, middle and low positions in door - cueing to avoid shoulder hiking given, improved activation of rhomboids with movement.  2 x 10        Edith received the following manual therapy techniques: x 40 min  Seated: cupping to Right and left Upper trap to address trigger points - dynamic cupping along mm then static at trigger point x 5 mins on each side; Removed cups - light massage to tissue to address hypererythema;   Supine: sub-occipital inhibition/release; OA flexion to address restriction; lateral gliding with rotation TP of C3 to right; lateral gliding Right C2; upslides facets bilaterally from T6 to upper cervical; upper trap/levator MET on right/left to address tissue tightness; Depression first rib on right with breathing techniques; internal rotation right clavicle at SC/AC.   Light cranial  lift with hold.    Patient noted reduction in pain following treatment today - rated neck pain at 1-2/10 at end of session.          DNP today  LF-ES per protocol for scalenes and SCM on the right; utilized 30/40 mm needles for two points on clavicular head of SCM, one point on sternal head/anterior scalene; right masseter, right middle scalene; Clockwise winding of needle for increased tissue grasp; Intensity of stimulation adjusted to patient tolerance, good rhythmical contraction of tissue noted; stimulation unit set on 6HZ, 0% scam. Needles left in-situ x 15 mins.   Removed needles and assisted patient to sitting at edge of table;   Provided moist heat to cervical spine x 12 mins.     Written Home Exercises Provided: reviewed with patient   -Scapular retractions - given tband for home   Pt demo good understanding of the education provided. Edith demonstrated good return demonstration of activities.     Education provided re:  -starting a light exercise program for upper quarter - needs support for her neck/spine   -getting a cervical pillow for improved sleep/reduction in neck pain    Edith verbalized good understanding of education provided.   No spiritual or educational barriers to learning provided    Assessment   Patient continues to demonstrate  improvement in cervical AROM without increase in her pain. She reports feels much better after therapy and continue to feel better for last several days;  Again discussed need to start exercising - building up strength in upper quarter to support her neck and thoracic spine, also suggested finding a yoga class to try for stretching/small mm strengthening and control- this will help alleviate continued stress in upper trap. Patient was agreeable to this - is going to meet with  at gym for some instruction in use of machines that would be helpful to her.      This is a 67 y.o. female referred to outpatient physical therapy and presents with a medical  diagnosis of R neck pain  and demonstrates limitations as described in the problem list. Pt prognosis is Good. Pt will continue to benefit from skilled outpatient physical therapy to address the deficits listed in the problem list, provide pt/family education and to maximize pt's level of independence in the home and community environment.     Goals as follows:  Short Term Goals: 3 weeks   Reduction in max level daily pain to 5/10 for improved ability to perform daily activities  > MET   Compliant with HEP > MET      Long Term Goals: 6 weeks   Reduction in pain levels to no more than 2/10 for return to painfree performance of ADL's  > Improving   Improvement in cervical AROM to WFL without increased pain/compensation > Improving   Able to lift 2# from waist to top cabinet with RUE without increased pain/compensation   Able to perform heavier activities without increased pain/compensation.  FOTO limitation score improved to 44%  > Exceeded 39%   Independent with HEP for continued improvement in function.         Plan     Has one additional visit, then will break until after her cervical ablation and see how she does.     Therapist: Clair Bautista, PT  5/22/2023

## 2023-05-25 ENCOUNTER — CLINICAL SUPPORT (OUTPATIENT)
Dept: REHABILITATION | Facility: HOSPITAL | Age: 68
End: 2023-05-25
Payer: MEDICARE

## 2023-05-25 DIAGNOSIS — M79.18 MYOFASCIAL PAIN SYNDROME, CERVICAL: Primary | ICD-10-CM

## 2023-05-25 PROCEDURE — 97140 MANUAL THERAPY 1/> REGIONS: CPT | Mod: PN,CQ

## 2023-05-25 NOTE — PROGRESS NOTES
Physical Therapy Daily Note     Name: Edith Rodriguez [facility] Number: [id number]  Diagnosis:   Encounter Diagnosis   Name Primary?    Myofascial pain syndrome, cervical Yes         Physician: Miracle Medina NP  Precautions: falls   Visit #: 19  (not including eval) (3 visits in [facility])  Plan of Care Expiration: 6/8/2023    PTA Visit #:  1 /5    Time In:  2:00 pm  Time Out:  2:55 pm  FOTO:  2/3  - last score update 4/18 : 39% limitation     Subjective     Pt reports: having a terrible day yesterday and the hurts on both sides.     Pain Scale: Edith rates pain on a scale of 0-10 to be 4  currently (with certain movements).     Objective       Cervical Range of Motion: updated 5/22/2023    Degrees Pain   Flexion 40 > 60     Extension 44 > 58    Left Rotation 60 >70     Right Rotation 42 >65    Left Side Bending 20     Right Side Bending 10          Edith received individual therapeutic exercises to develop strength, endurance, ROM, flexibility, and posture for 0  minutes including:  Scapular retractions with tband resistance - perform high, middle and low positions in door - cueing to avoid shoulder hiking given, improved activation of rhomboids with movement.  2 x 10        Edith received the following manual therapy techniques: x 53 min  Myofascial Release   Bilateral pterygoids   Sub occipitals   Upper traps/scalenes   Cranial lift         DNP today  LF-ES per protocol for scalenes and SCM on the right; utilized 30/40 mm needles for two points on clavicular head of SCM, one point on sternal head/anterior scalene; right masseter, right middle scalene; Clockwise winding of needle for increased tissue grasp; Intensity of stimulation adjusted to patient tolerance, good rhythmical contraction of tissue noted; stimulation unit set on 6HZ, 0% scam. Needles left in-situ x 15 mins.   Removed needles and assisted patient to sitting at edge of table;    Provided moist heat to cervical spine x 12 mins.     Written Home Exercises Provided: reviewed with patient   -Scapular retractions - given tband for home   Pt demo good understanding of the education provided. Edith demonstrated good return demonstration of activities.     Education provided re:  -starting a light exercise program for upper quarter - needs support for her neck/spine   -getting a cervical pillow for improved sleep/reduction in neck pain    Edith verbalized good understanding of education provided.   No spiritual or educational barriers to learning provided    Assessment   Patient demonstrated limited cervical range of motion today.  She doesn't know why she had a flare up.  The pain was 8/10 yesterday.  The myofascial tightness rebounded.  The releases were constructive today but not back to her last baseline.  She is scheduled to get a test injection from pain management and we will re-assess after that.      This is a 67 y.o. female referred to outpatient physical therapy and presents with a medical diagnosis of R neck pain  and demonstrates limitations as described in the problem list. Pt prognosis is Good. Pt will continue to benefit from skilled outpatient physical therapy to address the deficits listed in the problem list, provide pt/family education and to maximize pt's level of independence in the home and community environment.     Goals as follows:  Short Term Goals: 3 weeks   Reduction in max level daily pain to 5/10 for improved ability to perform daily activities  > MET   Compliant with HEP > MET      Long Term Goals: 6 weeks   Reduction in pain levels to no more than 2/10 for return to painfree performance of ADL's  > Improving   Improvement in cervical AROM to WFL without increased pain/compensation > Improving   Able to lift 2# from waist to top cabinet with RUE without increased pain/compensation   Able to perform heavier activities without increased pain/compensation.  FOTO  limitation score improved to 44%  > Exceeded 39%   Independent with HEP for continued improvement in function.         Plan     Re-assess after test oblation.    Therapist: Osmani Fuller, PTA  5/25/2023

## 2023-05-26 ENCOUNTER — HOSPITAL ENCOUNTER (OUTPATIENT)
Facility: AMBULARY SURGERY CENTER | Age: 68
Discharge: HOME | End: 2023-05-26
Attending: ANESTHESIOLOGY | Admitting: ANESTHESIOLOGY
Payer: MEDICARE

## 2023-05-26 DIAGNOSIS — M47.892 OTHER SPONDYLOSIS, CERVICAL REGION: ICD-10-CM

## 2023-05-26 PROCEDURE — 64491 PR INJ DX/THER AGNT PARAVERT FACET JOINT,IMG GUIDE,CERV/THORAC, 2ND LEVEL: ICD-10-PCS | Mod: RT,,, | Performed by: ANESTHESIOLOGY

## 2023-05-26 PROCEDURE — 64492 PR INJ DX/THER AGNT PARAVERT FACET JOINT,IMG GUIDE,CERV/THORAC, ADD LEVEL: ICD-10-PCS | Mod: RT,,, | Performed by: ANESTHESIOLOGY

## 2023-05-26 PROCEDURE — 64492 INJ PARAVERT F JNT C/T 3 LEV: CPT | Mod: RT

## 2023-05-26 PROCEDURE — 64490 INJ PARAVERT F JNT C/T 1 LEV: CPT | Mod: RT,,, | Performed by: ANESTHESIOLOGY

## 2023-05-26 PROCEDURE — 64491 INJ PARAVERT F JNT C/T 2 LEV: CPT | Mod: RT | Performed by: ANESTHESIOLOGY

## 2023-05-26 PROCEDURE — 64490 PR INJ DX/THER AGNT PARAVERT FACET JOINT,IMG GUIDE,CERV/THORAC, 1ST LEVEL: ICD-10-PCS | Mod: RT,,, | Performed by: ANESTHESIOLOGY

## 2023-05-26 PROCEDURE — 64492 INJ PARAVERT F JNT C/T 3 LEV: CPT | Mod: RT,,, | Performed by: ANESTHESIOLOGY

## 2023-05-26 PROCEDURE — 64490 INJ PARAVERT F JNT C/T 1 LEV: CPT | Mod: RT | Performed by: ANESTHESIOLOGY

## 2023-05-26 PROCEDURE — 64491 INJ PARAVERT F JNT C/T 2 LEV: CPT | Mod: RT,,, | Performed by: ANESTHESIOLOGY

## 2023-05-26 RX ORDER — SODIUM CHLORIDE, SODIUM LACTATE, POTASSIUM CHLORIDE, CALCIUM CHLORIDE 600; 310; 30; 20 MG/100ML; MG/100ML; MG/100ML; MG/100ML
INJECTION, SOLUTION INTRAVENOUS ONCE AS NEEDED
Status: COMPLETED | OUTPATIENT
Start: 2023-05-26 | End: 2023-05-26

## 2023-05-26 RX ORDER — MIDAZOLAM HYDROCHLORIDE 1 MG/ML
INJECTION INTRAMUSCULAR; INTRAVENOUS
Status: DISCONTINUED | OUTPATIENT
Start: 2023-05-26 | End: 2023-05-26 | Stop reason: HOSPADM

## 2023-05-26 RX ORDER — BUPIVACAINE HYDROCHLORIDE 5 MG/ML
INJECTION, SOLUTION EPIDURAL; INTRACAUDAL
Status: DISCONTINUED | OUTPATIENT
Start: 2023-05-26 | End: 2023-05-26 | Stop reason: HOSPADM

## 2023-05-26 RX ORDER — LIDOCAINE HYDROCHLORIDE 10 MG/ML
INJECTION, SOLUTION EPIDURAL; INFILTRATION; INTRACAUDAL; PERINEURAL
Status: DISCONTINUED | OUTPATIENT
Start: 2023-05-26 | End: 2023-05-26 | Stop reason: HOSPADM

## 2023-05-26 RX ADMIN — SODIUM CHLORIDE, SODIUM LACTATE, POTASSIUM CHLORIDE, CALCIUM CHLORIDE: 600; 310; 30; 20 INJECTION, SOLUTION INTRAVENOUS at 10:05

## 2023-05-26 NOTE — DISCHARGE SUMMARY
[facility]  Discharge Note  Short Stay    Procedure(s) (LRB):  Block-nerve-medial branch-cervical C3-C4-C5-C6 (Right)      OUTCOME: Patient tolerated treatment/procedure well without complication and is now ready for discharge.    DISPOSITION: Home or Self Care    FINAL DIAGNOSIS:  <principal problem not specified>    FOLLOWUP: In clinic    DISCHARGE INSTRUCTIONS:    Discharge Procedure Orders   Notify your health care provider if you experience any of the following:  temperature >100.4     Notify your health care provider if you experience any of the following:  severe uncontrolled pain     Notify your health care provider if you experience any of the following:  redness, tenderness, or signs of infection (pain, swelling, redness, odor or green/yellow discharge around incision site)     Activity as tolerated        TIME SPENT ON DISCHARGE:   30 minutes

## 2023-05-26 NOTE — PLAN OF CARE
Discharge instructions given to pt/bf, verbalized understanding.  Tolerating PO fluids.  IV removed.  No c/o pain.  Ambulating out to bf  per RN in no distress.

## 2023-05-26 NOTE — OP NOTE
DATE: 5/26/2023    PROCEDURE:  Cervical medial branch block of the C3,4,5,6 medial branch nerves on the right-side utilizing fluoroscopy    DIAGNOSIS:  Other cervical spondylosis    PHYSICIAN: Larry Thomason MD    MEDICATIONS INJECTED:  0.5% bupivicaine, 0.5ml at each level.    LOCAL ANESTHETIC USED:   1% lidocaine, 1ml at each level.    SEDATION MEDICATIONS: RN IV Versed    ESTIMATED BLOOD LOSS:  None    COMPLICATIONS:  None    TECHNIQUE : A time-out was taken to identify patient and procedure side prior to starting the procedure.  The patient was positioned in the prone position. The patient was prepped and draped in the usual sterile fashion using ChloraPrep and sterile towels.  The level was determined under fluoroscopic guidance using a slightly posteriorly oblique view.   Local anesthetic was infiltrated superficially at the skin level.  Then, a 3.5 inch 25 gauge needle was inserted to the anatomic location of the midsection of the lateral masses of C3,4,5,6 on the right side(s). A cross table view was then taken to ensure that needles did not cross into neural foramina.  The above noted medication was then injected. The patient tolerated the procedure well.     The patient was monitored after the procedure. Patient given a pain diary to document progress after procedure.  If found to have greater than a 50% recovery and so will be scheduled for a radiofrequency ablation of the corresponding nerves.     The patient was given post procedure and discharge instructions to follow at home. The patient was discharged in a stable condition

## 2023-05-29 ENCOUNTER — TELEPHONE (OUTPATIENT)
Dept: PAIN MEDICINE | Facility: CLINIC | Age: 68
End: 2023-05-29
Payer: MEDICARE

## 2023-05-30 ENCOUNTER — PATIENT MESSAGE (OUTPATIENT)
Dept: PAIN MEDICINE | Facility: CLINIC | Age: 68
End: 2023-05-30
Payer: MEDICARE

## 2023-05-30 VITALS
TEMPERATURE: 98 F | RESPIRATION RATE: 18 BRPM | OXYGEN SATURATION: 96 % | DIASTOLIC BLOOD PRESSURE: 82 MMHG | SYSTOLIC BLOOD PRESSURE: 130 MMHG | HEART RATE: 65 BPM

## 2023-05-30 DIAGNOSIS — M47.892 OTHER SPONDYLOSIS, CERVICAL REGION: Primary | ICD-10-CM

## 2023-06-20 NOTE — OR NURSING
PRE-OP PHONE CALL COMPLETE. INSTRUCTIONS REVIEWED. ALL QUESTIONS ANSWERED. VERBALIZED UNDERSTANDING.

## 2023-06-21 ENCOUNTER — HOSPITAL ENCOUNTER (OUTPATIENT)
Facility: HOSPITAL | Age: 68
Discharge: HOME | End: 2023-06-21
Attending: ANESTHESIOLOGY | Admitting: ANESTHESIOLOGY
Payer: MEDICARE

## 2023-06-21 VITALS
HEART RATE: 67 BPM | DIASTOLIC BLOOD PRESSURE: 78 MMHG | OXYGEN SATURATION: 96 % | SYSTOLIC BLOOD PRESSURE: 130 MMHG | RESPIRATION RATE: 18 BRPM | TEMPERATURE: 97 F

## 2023-06-21 DIAGNOSIS — M47.892 OTHER SPONDYLOSIS, CERVICAL REGION: ICD-10-CM

## 2023-06-21 PROCEDURE — 64492 INJ PARAVERT F JNT C/T 3 LEV: CPT | Mod: RT,,, | Performed by: ANESTHESIOLOGY

## 2023-06-21 PROCEDURE — 64491 PR INJ DX/THER AGNT PARAVERT FACET JOINT,IMG GUIDE,CERV/THORAC, 2ND LEVEL: ICD-10-PCS | Mod: RT,,, | Performed by: ANESTHESIOLOGY

## 2023-06-21 PROCEDURE — 64492 PR INJ DX/THER AGNT PARAVERT FACET JOINT,IMG GUIDE,CERV/THORAC, ADD LEVEL: ICD-10-PCS | Mod: RT,,, | Performed by: ANESTHESIOLOGY

## 2023-06-21 PROCEDURE — 36000705 HC OR TIME LEV I EA ADD 15 MIN: Performed by: ANESTHESIOLOGY

## 2023-06-21 PROCEDURE — 71000015 HC POSTOP RECOV 1ST HR: Performed by: ANESTHESIOLOGY

## 2023-06-21 PROCEDURE — 36000704 HC OR TIME LEV I 1ST 15 MIN: Performed by: ANESTHESIOLOGY

## 2023-06-21 PROCEDURE — 63600175 PHARM REV CODE 636 W HCPCS: Performed by: ANESTHESIOLOGY

## 2023-06-21 PROCEDURE — 64490 PR INJ DX/THER AGNT PARAVERT FACET JOINT,IMG GUIDE,CERV/THORAC, 1ST LEVEL: ICD-10-PCS | Mod: RT,,, | Performed by: ANESTHESIOLOGY

## 2023-06-21 PROCEDURE — 25000003 PHARM REV CODE 250: Performed by: ANESTHESIOLOGY

## 2023-06-21 PROCEDURE — 99900103 DSU ONLY-NO CHARGE-INITIAL HR (STAT): Performed by: ANESTHESIOLOGY

## 2023-06-21 PROCEDURE — 64490 INJ PARAVERT F JNT C/T 1 LEV: CPT | Mod: RT,,, | Performed by: ANESTHESIOLOGY

## 2023-06-21 PROCEDURE — 99900104 DSU ONLY-NO CHARGE-EA ADD'L HR (STAT): Performed by: ANESTHESIOLOGY

## 2023-06-21 PROCEDURE — 64491 INJ PARAVERT F JNT C/T 2 LEV: CPT | Mod: RT,,, | Performed by: ANESTHESIOLOGY

## 2023-06-21 RX ORDER — MIDAZOLAM HYDROCHLORIDE 1 MG/ML
INJECTION, SOLUTION INTRAMUSCULAR; INTRAVENOUS
Status: DISCONTINUED | OUTPATIENT
Start: 2023-06-21 | End: 2023-06-21 | Stop reason: HOSPADM

## 2023-06-21 RX ORDER — BUPIVACAINE HYDROCHLORIDE 5 MG/ML
INJECTION, SOLUTION EPIDURAL; INTRACAUDAL
Status: DISCONTINUED | OUTPATIENT
Start: 2023-06-21 | End: 2023-06-21 | Stop reason: HOSPADM

## 2023-06-21 RX ORDER — LIDOCAINE HYDROCHLORIDE 10 MG/ML
INJECTION, SOLUTION EPIDURAL; INFILTRATION; INTRACAUDAL; PERINEURAL
Status: DISCONTINUED | OUTPATIENT
Start: 2023-06-21 | End: 2023-06-21 | Stop reason: HOSPADM

## 2023-06-21 RX ORDER — SODIUM CHLORIDE 9 MG/ML
500 INJECTION, SOLUTION INTRAVENOUS CONTINUOUS
Status: ACTIVE | OUTPATIENT
Start: 2023-06-21 | End: 2023-06-22

## 2023-06-21 RX ADMIN — SODIUM CHLORIDE 500 ML: 0.9 INJECTION, SOLUTION INTRAVENOUS at 09:06

## 2023-06-21 NOTE — H&P (VIEW-ONLY)
CC: neck pain    HPI: The patient is a 67 y.o. female with a history of neck pain here for MBB. There are no major changes in history and physical from 5/8/23 by Miracle.    Past Medical History:   Diagnosis Date    Arthritis     Asthma     H/O: pneumonia     Hypertension     Hypotension, iatrogenic     post-op    Osteopenia     Post-menopausal bleeding 01/2023    Respiratory distress     Thyroid disease     hypo       Past Surgical History:   Procedure Laterality Date    AUGMENTATION OF BREAST Bilateral     removal    BREAST RECONSTRUCTION      COLONOSCOPY N/A 03/17/2022    Procedure: COLONOSCOPY;  Surgeon: Alessandra Springer MD;  Location: [facility];  Service: General;  Laterality: N/A;    EPIDURAL STEROID INJECTION INTO CERVICAL SPINE N/A 01/13/2023    Procedure: Injection-steroid-epidural-cervical;  Surgeon: Larry Thomason MD;  Location: [facility] OR;  Service: Pain Management;  Laterality: N/A;  C7-T1    HIP REPLACEMENT ARTHROPLASTY Right     HYSTEROSCOPY WITH DILATION AND CURETTAGE OF UTERUS N/A 1/30/2023    Procedure: HYSTEROSCOPY, WITH DILATION AND CURETTAGE OF UTERUS;possible myosure;  Surgeon: Carie Dwyer MD;  Location: [facility];  Service: OB/GYN;  Laterality: N/A;    INJECTION OF ANESTHETIC AGENT AROUND MEDIAL BRANCH NERVES INNERVATING CERVICAL FACET JOINT Right 5/26/2023    Procedure: Block-nerve-medial branch-cervical C3-C4-C5-C6;  Surgeon: Larry Thomason MD;  Location: [facility];  Service: Pain Management;  Laterality: Right;    JOINT REPLACEMENT      TRANSFORAMINAL EPIDURAL INJECTION OF STEROID Right 3/17/2023    Procedure: Injection,steroid,cervical,transforaminal,   Right C6-C7, C7-T1;  Surgeon: Larry Thomason MD;  Location: [facility];  Service: Pain Management;  Laterality: Right;       Family History   Problem Relation Age of Onset    Heart disease Mother     Diabetes Mother     Stroke Mother     Heart attack Mother     Thyroid disease Mother     Heart attack Father     Breast cancer Neg Hx     Ovarian cancer Neg Hx         Social History     Socioeconomic History    Marital status: Single   Tobacco Use    Smoking status: Never    Smokeless tobacco: Never   Substance and Sexual Activity    Alcohol use: Yes     Alcohol/week: 2.0 standard drinks     Types: 2 Glasses of wine per week     Comment: socially    Drug use: Never       Current Facility-Administered Medications   Medication Dose Route Frequency Provider Last Rate Last Admin    0.9%  NaCl infusion  500 mL Intravenous Continuous Larry Thomason MD         Facility-Administered Medications Ordered in Other Encounters   Medication Dose Route Frequency Provider Last Rate Last Admin    lactated ringers infusion   Intravenous Continuous Larry Thomason MD   Stopped at 03/17/23 0850       Review of patient's allergies indicates:   Allergen Reactions    Percocet [oxycodone-acetaminophen] Other (See Comments)     Headaches    Penicillins Rash       There were no vitals filed for this visit.    REVIEW OF SYSTEMS:     GENERAL: No weight loss, malaise or fevers.  HEENT:  No recent changes in vision or hearing  NECK: Negative for lumps, no difficulty with swallowing.  RESPIRATORY: Negative for cough, wheezing or shortness of breath, patient denies any recent URI.  CARDIOVASCULAR: Negative for chest pain, leg swelling or palpitations.  GI: Negative for abdominal discomfort, blood in stools or black stools or change in bowel habits.  MUSCULOSKELETAL: See HPI.  SKIN: Negative for lesions, rash, and itching.  PSYCH: No suicidal or homicidal ideations, no current mood disturbances.  HEMATOLOGY/LYMPHOLOGY: Negative for prolonged bleeding, bruising easily or swollen nodes. Patient is not currently taking any anti-coagulants  ENDO: No history of diabetes or thyroid dysfunction  NEURO: No history of syncope, paralysis, seizures or tremors.All other reviewed and negative other than HPI.    Physical exam:  Gen: A and O x3, pleasant, well-groomed  Skin: No rashes or obvious lesions  HEENT: PERRLA, no  obvious deformities on ears or in canals. No thyroid masses, trachea midline, no palpable lymph nodes in neck, axilla.  CVS: Regular rate and rhythm, normal S1 and S2, no murmurs.  Resp: Clear to auscultation bilaterally.  Abdomen: Soft, NT/ND, normal bowel sounds present.  Musculoskeletal/Neuro: Moving all extremities    Assessment:  Other spondylosis, cervical region  -     Case Request Operating Room: Block-nerve-medial branch-cervical          PLAN: MBB      This patient has been cleared for surgery in an ambulatory surgical facility    ASA 3,  Mallampatti Score 3  No history of anesthetic complications  Plan for RN IV sedation

## 2023-06-21 NOTE — OP NOTE
DATE: 6/21/2023    PROCEDURE:  Cervical medial branch block of the C3,4,5,6 medial branch nerves on the right-side utilizing fluoroscopy    DIAGNOSIS:  Other cervical spondylosis    PHYSICIAN: Larry Thomason MD    MEDICATIONS INJECTED:  0.25% bupivicaine, 0.5ml at each level.    LOCAL ANESTHETIC USED:   1% lidocaine, 1ml at each level.    SEDATION MEDICATIONS: RN IV Versed    ESTIMATED BLOOD LOSS:  None    COMPLICATIONS:  None    TECHNIQUE : A time-out was taken to identify patient and procedure side prior to starting the procedure.  The patient was positioned in the prone position. The patient was prepped and draped in the usual sterile fashion using ChloraPrep and sterile towels.  The level was determined under fluoroscopic guidance using a slightly posteriorly oblique view.   Local anesthetic was infiltrated superficially at the skin level.  Then, a 3.5 inch 25 gauge needle was inserted to the anatomic location of the midsection of the lateral masses of C3,4,5,6 on the right side(s). A cross table view was then taken to ensure that needles did not cross into neural foramina.  The above noted medication was then injected. The patient tolerated the procedure well.     The patient was monitored after the procedure. Patient given a pain diary to document progress after procedure.  If found to have greater than a 50% recovery and so will be scheduled for a radiofrequency ablation of the corresponding nerves.     The patient was given post procedure and discharge instructions to follow at home. The patient was discharged in a stable condition

## 2023-06-21 NOTE — PLAN OF CARE
Pt and family given discharge instructions. No prescriptions given. Educated on post anesthesia precautions, dressing care, and when to notify MD. All belongings returned to pt. Transferred to personal vehicle via wheelchair.

## 2023-06-21 NOTE — DISCHARGE SUMMARY
[facility]  Discharge Note  Short Stay    Procedure(s) (LRB):  Block-nerve-medial branch-cervical (Right)      OUTCOME: Patient tolerated treatment/procedure well without complication and is now ready for discharge.    DISPOSITION: Home or Self Care    FINAL DIAGNOSIS:  <principal problem not specified>    FOLLOWUP: In clinic    DISCHARGE INSTRUCTIONS:    Discharge Procedure Orders   Notify your health care provider if you experience any of the following:  temperature >100.4     Notify your health care provider if you experience any of the following:  severe uncontrolled pain     Notify your health care provider if you experience any of the following:  redness, tenderness, or signs of infection (pain, swelling, redness, odor or green/yellow discharge around incision site)     Activity as tolerated        TIME SPENT ON DISCHARGE:   30 minutes

## 2023-06-21 NOTE — H&P
CC: neck pain    HPI: The patient is a 67 y.o. female with a history of neck pain here for MBB. There are no major changes in history and physical from 5/8/23 by Miracle.    Past Medical History:   Diagnosis Date    Arthritis     Asthma     H/O: pneumonia     Hypertension     Hypotension, iatrogenic     post-op    Osteopenia     Post-menopausal bleeding 01/2023    Respiratory distress     Thyroid disease     hypo       Past Surgical History:   Procedure Laterality Date    AUGMENTATION OF BREAST Bilateral     removal    BREAST RECONSTRUCTION      COLONOSCOPY N/A 03/17/2022    Procedure: COLONOSCOPY;  Surgeon: Alessandra Springer MD;  Location: [facility];  Service: General;  Laterality: N/A;    EPIDURAL STEROID INJECTION INTO CERVICAL SPINE N/A 01/13/2023    Procedure: Injection-steroid-epidural-cervical;  Surgeon: Larry Thomason MD;  Location: [facility] OR;  Service: Pain Management;  Laterality: N/A;  C7-T1    HIP REPLACEMENT ARTHROPLASTY Right     HYSTEROSCOPY WITH DILATION AND CURETTAGE OF UTERUS N/A 1/30/2023    Procedure: HYSTEROSCOPY, WITH DILATION AND CURETTAGE OF UTERUS;possible myosure;  Surgeon: Carie Dwyer MD;  Location: [facility];  Service: OB/GYN;  Laterality: N/A;    INJECTION OF ANESTHETIC AGENT AROUND MEDIAL BRANCH NERVES INNERVATING CERVICAL FACET JOINT Right 5/26/2023    Procedure: Block-nerve-medial branch-cervical C3-C4-C5-C6;  Surgeon: Larry Thomason MD;  Location: [facility];  Service: Pain Management;  Laterality: Right;    JOINT REPLACEMENT      TRANSFORAMINAL EPIDURAL INJECTION OF STEROID Right 3/17/2023    Procedure: Injection,steroid,cervical,transforaminal,   Right C6-C7, C7-T1;  Surgeon: Larry Thomason MD;  Location: [facility];  Service: Pain Management;  Laterality: Right;       Family History   Problem Relation Age of Onset    Heart disease Mother     Diabetes Mother     Stroke Mother     Heart attack Mother     Thyroid disease Mother     Heart attack Father     Breast cancer Neg Hx     Ovarian cancer Neg Hx         Social History     Socioeconomic History    Marital status: Single   Tobacco Use    Smoking status: Never    Smokeless tobacco: Never   Substance and Sexual Activity    Alcohol use: Yes     Alcohol/week: 2.0 standard drinks     Types: 2 Glasses of wine per week     Comment: socially    Drug use: Never       Current Facility-Administered Medications   Medication Dose Route Frequency Provider Last Rate Last Admin    0.9%  NaCl infusion  500 mL Intravenous Continuous Larry Thomason MD         Facility-Administered Medications Ordered in Other Encounters   Medication Dose Route Frequency Provider Last Rate Last Admin    lactated ringers infusion   Intravenous Continuous Larry Thomason MD   Stopped at 03/17/23 0850       Review of patient's allergies indicates:   Allergen Reactions    Percocet [oxycodone-acetaminophen] Other (See Comments)     Headaches    Penicillins Rash       There were no vitals filed for this visit.    REVIEW OF SYSTEMS:     GENERAL: No weight loss, malaise or fevers.  HEENT:  No recent changes in vision or hearing  NECK: Negative for lumps, no difficulty with swallowing.  RESPIRATORY: Negative for cough, wheezing or shortness of breath, patient denies any recent URI.  CARDIOVASCULAR: Negative for chest pain, leg swelling or palpitations.  GI: Negative for abdominal discomfort, blood in stools or black stools or change in bowel habits.  MUSCULOSKELETAL: See HPI.  SKIN: Negative for lesions, rash, and itching.  PSYCH: No suicidal or homicidal ideations, no current mood disturbances.  HEMATOLOGY/LYMPHOLOGY: Negative for prolonged bleeding, bruising easily or swollen nodes. Patient is not currently taking any anti-coagulants  ENDO: No history of diabetes or thyroid dysfunction  NEURO: No history of syncope, paralysis, seizures or tremors.All other reviewed and negative other than HPI.    Physical exam:  Gen: A and O x3, pleasant, well-groomed  Skin: No rashes or obvious lesions  HEENT: PERRLA, no  obvious deformities on ears or in canals. No thyroid masses, trachea midline, no palpable lymph nodes in neck, axilla.  CVS: Regular rate and rhythm, normal S1 and S2, no murmurs.  Resp: Clear to auscultation bilaterally.  Abdomen: Soft, NT/ND, normal bowel sounds present.  Musculoskeletal/Neuro: Moving all extremities    Assessment:  Other spondylosis, cervical region  -     Case Request Operating Room: Block-nerve-medial branch-cervical          PLAN: MBB      This patient has been cleared for surgery in an ambulatory surgical facility    ASA 3,  Mallampatti Score 3  No history of anesthetic complications  Plan for RN IV sedation

## 2023-06-22 ENCOUNTER — TELEPHONE (OUTPATIENT)
Dept: PAIN MEDICINE | Facility: CLINIC | Age: 68
End: 2023-06-22
Payer: MEDICARE

## 2023-06-22 ENCOUNTER — PATIENT MESSAGE (OUTPATIENT)
Dept: PAIN MEDICINE | Facility: CLINIC | Age: 68
End: 2023-06-22
Payer: MEDICARE

## 2023-06-22 DIAGNOSIS — M47.892 OTHER SPONDYLOSIS, CERVICAL REGION: Primary | ICD-10-CM

## 2023-06-22 NOTE — TELEPHONE ENCOUNTER
----- Message from Ladan Dansville sent at 6/22/2023  8:18 AM CDT -----  Contact: self  Type:  Needs Medical Advice    Who Called: self    Would the patient rather a call back or a response via [facility]? call  Best Call Back Number: [phone] (home)     Additional Information: pt sts she is going out of town on the 6th and wanted to know if that would be enough time to heal from her procedure. Please advise and thank you.

## 2023-06-23 ENCOUNTER — PATIENT MESSAGE (OUTPATIENT)
Dept: FAMILY MEDICINE | Facility: CLINIC | Age: 68
End: 2023-06-23
Payer: MEDICARE

## 2023-06-23 DIAGNOSIS — R68.89 COLD INTOLERANCE: ICD-10-CM

## 2023-06-23 DIAGNOSIS — I10 ESSENTIAL HYPERTENSION: ICD-10-CM

## 2023-06-23 DIAGNOSIS — R79.9 ABNORMAL FINDING OF BLOOD CHEMISTRY, UNSPECIFIED: ICD-10-CM

## 2023-06-23 DIAGNOSIS — E03.9 ACQUIRED HYPOTHYROIDISM: Primary | ICD-10-CM

## 2023-06-23 DIAGNOSIS — E03.9 ACQUIRED HYPOTHYROIDISM: ICD-10-CM

## 2023-06-23 RX ORDER — LEVOTHYROXINE SODIUM 75 UG/1
TABLET ORAL
Qty: 90 TABLET | Refills: 3 | Status: SHIPPED | OUTPATIENT
Start: 2023-06-23

## 2023-06-24 ENCOUNTER — PATIENT MESSAGE (OUTPATIENT)
Dept: FAMILY MEDICINE | Facility: CLINIC | Age: 68
End: 2023-06-24
Payer: MEDICARE

## 2023-06-26 ENCOUNTER — LAB VISIT (OUTPATIENT)
Dept: LAB | Facility: HOSPITAL | Age: 68
End: 2023-06-26
Attending: FAMILY MEDICINE
Payer: MEDICARE

## 2023-06-26 DIAGNOSIS — Z11.59 NEED FOR HEPATITIS C SCREENING TEST: ICD-10-CM

## 2023-06-26 DIAGNOSIS — R68.89 COLD INTOLERANCE: ICD-10-CM

## 2023-06-26 DIAGNOSIS — I10 ESSENTIAL HYPERTENSION: ICD-10-CM

## 2023-06-26 DIAGNOSIS — E03.9 ACQUIRED HYPOTHYROIDISM: ICD-10-CM

## 2023-06-26 DIAGNOSIS — R79.9 ABNORMAL FINDING OF BLOOD CHEMISTRY, UNSPECIFIED: ICD-10-CM

## 2023-06-26 LAB
BASOPHILS # BLD AUTO: 0.04 K/UL (ref 0–0.2)
BASOPHILS NFR BLD: 0.8 % (ref 0–1.9)
DIFFERENTIAL METHOD: ABNORMAL
EOSINOPHIL # BLD AUTO: 0.3 K/UL (ref 0–0.5)
EOSINOPHIL NFR BLD: 7.2 % (ref 0–8)
ERYTHROCYTE [DISTWIDTH] IN BLOOD BY AUTOMATED COUNT: 12.9 % (ref 11.5–14.5)
FERRITIN SERPL-MCNC: 114 NG/ML (ref 20–300)
HCT VFR BLD AUTO: 37.5 % (ref 37–48.5)
HCV AB SERPL QL IA: NORMAL
HGB BLD-MCNC: 13.2 G/DL (ref 12–16)
IMM GRANULOCYTES # BLD AUTO: 0.01 K/UL (ref 0–0.04)
IMM GRANULOCYTES NFR BLD AUTO: 0.2 % (ref 0–0.5)
IRON SERPL-MCNC: 105 UG/DL (ref 30–160)
LYMPHOCYTES # BLD AUTO: 2 K/UL (ref 1–4.8)
LYMPHOCYTES NFR BLD: 42.5 % (ref 18–48)
MCH RBC QN AUTO: 31 PG (ref 27–31)
MCHC RBC AUTO-ENTMCNC: 35.2 G/DL (ref 32–36)
MCV RBC AUTO: 88 FL (ref 82–98)
MONOCYTES # BLD AUTO: 0.5 K/UL (ref 0.3–1)
MONOCYTES NFR BLD: 10.3 % (ref 4–15)
NEUTROPHILS # BLD AUTO: 1.9 K/UL (ref 1.8–7.7)
NEUTROPHILS NFR BLD: 39 % (ref 38–73)
NRBC BLD-RTO: 0 /100 WBC
PLATELET # BLD AUTO: 266 K/UL (ref 150–450)
PMV BLD AUTO: 8 FL (ref 9.2–12.9)
RBC # BLD AUTO: 4.26 M/UL (ref 4–5.4)
SATURATED IRON: 31 % (ref 20–50)
TOTAL IRON BINDING CAPACITY: 339 UG/DL (ref 250–450)
TRANSFERRIN SERPL-MCNC: 229 MG/DL (ref 200–375)
TSH SERPL DL<=0.005 MIU/L-ACNC: 1.3 UIU/ML (ref 0.4–4)
WBC # BLD AUTO: 4.75 K/UL (ref 3.9–12.7)

## 2023-06-26 PROCEDURE — 84466 ASSAY OF TRANSFERRIN: CPT | Performed by: FAMILY MEDICINE

## 2023-06-26 PROCEDURE — 86803 HEPATITIS C AB TEST: CPT | Performed by: FAMILY MEDICINE

## 2023-06-26 PROCEDURE — 83540 ASSAY OF IRON: CPT | Performed by: FAMILY MEDICINE

## 2023-06-26 PROCEDURE — 82728 ASSAY OF FERRITIN: CPT | Performed by: FAMILY MEDICINE

## 2023-06-26 PROCEDURE — 85025 COMPLETE CBC W/AUTO DIFF WBC: CPT | Performed by: FAMILY MEDICINE

## 2023-06-26 PROCEDURE — 84443 ASSAY THYROID STIM HORMONE: CPT | Performed by: FAMILY MEDICINE

## 2023-06-26 PROCEDURE — 36415 COLL VENOUS BLD VENIPUNCTURE: CPT | Performed by: FAMILY MEDICINE

## 2023-06-27 DIAGNOSIS — M54.12 CERVICAL RADICULITIS: ICD-10-CM

## 2023-06-27 RX ORDER — IBUPROFEN 600 MG/1
600 TABLET ORAL EVERY 6 HOURS PRN
Qty: 60 TABLET | Refills: 0 | Status: SHIPPED | OUTPATIENT
Start: 2023-06-27 | End: 2023-08-24 | Stop reason: SDUPTHER

## 2023-06-27 NOTE — TELEPHONE ENCOUNTER
----- Message from Thad Fung sent at 6/27/2023  8:26 AM CDT -----  Type: Needs Medical Advice  Who Called:  Patient    Best Call Back Number: [phone]  Additional Information: Patient states that she would like a callback regarding needing paperwork with her name on it describing her surgery and that her refill isn't at the pharmacy:  ibuprofen (ADVIL,MOTRIN) 600 MG tablet      [facility] #[id number] [place] [address] AT [facility] [address] [address]  Phone: [phone] Fax: [phone]

## 2023-07-10 ENCOUNTER — PATIENT MESSAGE (OUTPATIENT)
Dept: SURGERY | Facility: HOSPITAL | Age: 68
End: 2023-07-10

## 2023-07-17 ENCOUNTER — TELEPHONE (OUTPATIENT)
Dept: PAIN MEDICINE | Facility: CLINIC | Age: 68
End: 2023-07-17
Payer: MEDICARE

## 2023-07-17 NOTE — TELEPHONE ENCOUNTER
----- Message from Chanda Barreto MA sent at 7/17/2023  4:07 PM CDT -----    ----- Message -----  From: Chaparrita Chacon  Sent: 7/17/2023   3:57 PM CDT  To: [facility]addy Ju[facility] Staff    Type: Needs Medical Advice  Who Called:  pt     Best Call Back Number: [phone] (home)     Additional Information: requesting a call back , pt wants to confirm sx please advise thank you

## 2023-07-18 NOTE — PRE-PROCEDURE INSTRUCTIONS
Discussed pre procedure plan, v/u. Instructed the following: Do not eat or drink after midnight. May have sip of water with morning meds. Do not take ibuprofen for two days prior to procedure. Shower morning of procedure. Leave all jewelry at home. Check in at registration morning of procedure and then will be directed where to proceed.

## 2023-07-19 ENCOUNTER — HOSPITAL ENCOUNTER (OUTPATIENT)
Facility: HOSPITAL | Age: 68
Discharge: HOME | End: 2023-07-19
Attending: ANESTHESIOLOGY | Admitting: ANESTHESIOLOGY
Payer: MEDICARE

## 2023-07-19 VITALS
SYSTOLIC BLOOD PRESSURE: 122 MMHG | OXYGEN SATURATION: 100 % | BODY MASS INDEX: 21.34 KG/M2 | DIASTOLIC BLOOD PRESSURE: 77 MMHG | HEART RATE: 72 BPM | RESPIRATION RATE: 18 BRPM | TEMPERATURE: 98 F | WEIGHT: 125 LBS | HEIGHT: 64 IN

## 2023-07-19 DIAGNOSIS — M47.892 OTHER SPONDYLOSIS, CERVICAL REGION: ICD-10-CM

## 2023-07-19 PROCEDURE — 36000706: Performed by: ANESTHESIOLOGY

## 2023-07-19 PROCEDURE — 71000015 HC POSTOP RECOV 1ST HR: Performed by: ANESTHESIOLOGY

## 2023-07-19 PROCEDURE — 27201423 OPTIME MED/SURG SUP & DEVICES STERILE SUPPLY: Performed by: ANESTHESIOLOGY

## 2023-07-19 PROCEDURE — 25000003 PHARM REV CODE 250: Performed by: ANESTHESIOLOGY

## 2023-07-19 PROCEDURE — 36000707: Performed by: ANESTHESIOLOGY

## 2023-07-19 PROCEDURE — 64634 PR DESTROY C/TH FACET JNT ADDL: ICD-10-PCS | Mod: RT,,, | Performed by: ANESTHESIOLOGY

## 2023-07-19 PROCEDURE — 64634 DESTROY C/TH FACET JNT ADDL: CPT | Mod: RT,,, | Performed by: ANESTHESIOLOGY

## 2023-07-19 PROCEDURE — 64633 DESTROY CERV/THOR FACET JNT: CPT | Mod: RT,,, | Performed by: ANESTHESIOLOGY

## 2023-07-19 PROCEDURE — 63600175 PHARM REV CODE 636 W HCPCS: Performed by: ANESTHESIOLOGY

## 2023-07-19 PROCEDURE — 64633 PR DESTROY CERV/THOR FACET JNT: ICD-10-PCS | Mod: RT,,, | Performed by: ANESTHESIOLOGY

## 2023-07-19 RX ORDER — LIDOCAINE HYDROCHLORIDE 20 MG/ML
INJECTION, SOLUTION EPIDURAL; INFILTRATION; INTRACAUDAL; PERINEURAL
Status: DISCONTINUED | OUTPATIENT
Start: 2023-07-19 | End: 2023-07-19 | Stop reason: HOSPADM

## 2023-07-19 RX ORDER — BUPIVACAINE HYDROCHLORIDE 2.5 MG/ML
INJECTION, SOLUTION EPIDURAL; INFILTRATION; INTRACAUDAL
Status: DISCONTINUED | OUTPATIENT
Start: 2023-07-19 | End: 2023-07-19 | Stop reason: HOSPADM

## 2023-07-19 RX ORDER — METHYLPREDNISOLONE ACETATE 80 MG/ML
INJECTION, SUSPENSION INTRA-ARTICULAR; INTRALESIONAL; INTRAMUSCULAR; SOFT TISSUE
Status: DISCONTINUED | OUTPATIENT
Start: 2023-07-19 | End: 2023-07-19 | Stop reason: HOSPADM

## 2023-07-19 RX ORDER — SODIUM CHLORIDE, SODIUM LACTATE, POTASSIUM CHLORIDE, CALCIUM CHLORIDE 600; 310; 30; 20 MG/100ML; MG/100ML; MG/100ML; MG/100ML
INJECTION, SOLUTION INTRAVENOUS CONTINUOUS
Status: ACTIVE | OUTPATIENT
Start: 2023-07-19

## 2023-07-19 RX ORDER — LIDOCAINE HYDROCHLORIDE 10 MG/ML
1 INJECTION, SOLUTION EPIDURAL; INFILTRATION; INTRACAUDAL; PERINEURAL ONCE
Status: ACTIVE | OUTPATIENT
Start: 2023-07-19

## 2023-07-19 RX ORDER — MIDAZOLAM HYDROCHLORIDE 1 MG/ML
INJECTION INTRAMUSCULAR; INTRAVENOUS
Status: DISCONTINUED | OUTPATIENT
Start: 2023-07-19 | End: 2023-07-19 | Stop reason: HOSPADM

## 2023-07-19 RX ORDER — FENTANYL CITRATE 50 UG/ML
INJECTION, SOLUTION INTRAMUSCULAR; INTRAVENOUS
Status: DISCONTINUED | OUTPATIENT
Start: 2023-07-19 | End: 2023-07-19 | Stop reason: HOSPADM

## 2023-07-19 RX ORDER — LIDOCAINE HYDROCHLORIDE 10 MG/ML
INJECTION, SOLUTION EPIDURAL; INFILTRATION; INTRACAUDAL; PERINEURAL
Status: DISCONTINUED | OUTPATIENT
Start: 2023-07-19 | End: 2023-07-19 | Stop reason: HOSPADM

## 2023-07-19 RX ADMIN — SODIUM CHLORIDE, SODIUM GLUCONATE, SODIUM ACETATE, POTASSIUM CHLORIDE, MAGNESIUM CHLORIDE, SODIUM PHOSPHATE, DIBASIC, AND POTASSIUM PHOSPHATE: .53; .5; .37; .037; .03; .012; .00082 INJECTION, SOLUTION INTRAVENOUS at 06:07

## 2023-07-19 NOTE — DISCHARGE INSTRUCTIONS
No activity that takes mental clarity or physical endurance for 24 hours.  Apply ice to injection site(s) for 20 min an least 3 x daily.  You can resume taking any medications tomorrow that were held before the procedure.  You may have immediate relief from the numbing medication. When this wears off, your symptoms may return to baseline and occasionally are worse than prior to the procedure. Sometimes it may take up to 1-2 weeks before you experience any pain relief.   You can remove the dressing(s) tomorrow. No need to redress.  Monitor sites for redness, swelling, drainage with a foul odor, or temperature greater than 100.4.    Please seek medical help immediately if any of the following symptoms occur:  Severe increase in your usual pain or appearance of new pain.  Prolonged or increasing weakness or numbness in the legs or arms.  Headache that increases when your head is upright and decreases when you lie flat.  Shortness of breath, chest pain, or problem breathing.       We hope your stay was comfortable as you heal now, mend and rest.    For we have enjoyed taking care of you by giving your our best.    And as you get better, by regaining your health and strength;   We count it as a privilege to have served you and hope your time at [facility] was well spent.      Thank  You!!!

## 2023-07-19 NOTE — DISCHARGE SUMMARY
[facility]  Discharge Note  Short Stay    Procedure(s) (LRB):  RADIOFREQUENCY THERMAL COAGULATION, NERVE, SPINAL, CERVICAL, POSTERIOR RAMUS, MEDIAL BRANCH (Right)      OUTCOME: Patient tolerated treatment/procedure well without complication and is now ready for discharge.    DISPOSITION: Home or Self Care    FINAL DIAGNOSIS:  <principal problem not specified>    FOLLOWUP: In clinic    DISCHARGE INSTRUCTIONS:    Discharge Procedure Orders   Notify your health care provider if you experience any of the following:  temperature >100.4     Notify your health care provider if you experience any of the following:  severe uncontrolled pain     Notify your health care provider if you experience any of the following:  redness, tenderness, or signs of infection (pain, swelling, redness, odor or green/yellow discharge around incision site)     Activity as tolerated        TIME SPENT ON DISCHARGE:   30 minutes

## 2023-07-19 NOTE — OP NOTE
PROCEDURE DATE: 7/19/2023    PROCEDURE:  Radiofrequency ablation of the C3,4,5,6 medial branch nerves on the right-side under fluoroscopy    DIAGNOSIS:  Other Cervical spondylosis  Post Op Diagnosis: Same    PHYSICIAN: Larry Thomason MD    MEDICATIONS INJECTED:  From a mixture of 6ml of 0.25%bupivicaine and 80mg of methylprednisone, 1ml of this solution was injected at each level.    LOCAL ANESTHETIC USED:   1ml of lidocaine 1% at each level    SEDATION MEDICATIONS: RN IV sedation    ESTIMATED BLOOD LOSS:  None    COMPLICATIONS:  None    TECHNIQUE:   A time-out taken to identify patient and procedure side prior to starting the procedure.  The patient was positioned in the prone position. The patient was prepped and draped in the usual sterile fashion using ChloraPrep and sterile towels.  The levels were determined under fluoroscopic guidance using a slightly posteriorly oblique view.   Local anesthetic was infiltrated superficially at the skin.  Then a 100 mm 20g bent tip RF needle was inserted to the anatomic location of the midsection of the lateral masses of C3,4,5,6 on the right side(s). A cross table view was then taken to ensure that needles did not cross into neural foramina.  Impedance was less than 800 ohms at each level. Motor stimulation up to 2 volts confirmed there was no nerve root involvement at each level. Medication was then injected slowly.  Ablation was done per level utilizing radiofrequency generator at 80°C for 60 seconds. The patient tolerated the procedure well.     The patient was monitored after the procedure.  Patient was given post procedure and discharge instructions to follow at home.  The patient was discharged in a stable condition

## 2023-07-19 NOTE — PLAN OF CARE
Pt and family given discharge instructions . Educated on post anesthesia precautions, dressing care, and when to notify MD. All belongings returned to pt. Transferred to personal vehicle via wheelchair.

## 2023-08-01 ENCOUNTER — PATIENT MESSAGE (OUTPATIENT)
Dept: FAMILY MEDICINE | Facility: CLINIC | Age: 68
End: 2023-08-01
Payer: MEDICARE

## 2023-08-01 DIAGNOSIS — I10 ESSENTIAL HYPERTENSION: ICD-10-CM

## 2023-08-01 RX ORDER — METOPROLOL SUCCINATE 50 MG/1
50 TABLET, EXTENDED RELEASE ORAL DAILY
Qty: 90 TABLET | Refills: 3 | Status: SHIPPED | OUTPATIENT
Start: 2023-08-01

## 2023-08-01 NOTE — TELEPHONE ENCOUNTER
----- Message from Estevan Donato sent at 8/1/2023  8:22 AM CDT -----  Regarding: refill  Contact: jeramie at [phone]  Type:  RX Refill Request    Who Called:  jeramie  Refill or New Rx:  refill    RX Name and Strength:    metoprolol succinate (TOPROL-XL) 50 MG 24 hr tablet 90 tablet 3 5/17/2022   Sig - Route: Take 1 tablet (50 mg total) by mouth once daily. - Oral   Sent to pharmacy as: metoprolol succinate (TOPROL-XL) 50 MG 24 hr tablet   Notes to Pharmacy: .   E-Prescribing Status: Receipt confirmed by pharmacy (5/17/2022 11:40 AM CDT)     How is the patient currently taking it? (ex. 1XDay):  see above  Is this a 30 day or 90 day RX:  see above    Preferred Pharmacy with phone number:    [facility] #[id number] [place] [address] AT [address]  [address] [address]  Phone: [phone] Fax: [phone]    Local or Mail Order:  local    Ordering Provider:  Dr Nabor Fischer Call Back Number:  [phone]    Additional Information:  PT IS OUT OF MED. PLEASE FILL TODAY.

## 2023-08-05 DIAGNOSIS — I10 ESSENTIAL HYPERTENSION: ICD-10-CM

## 2023-08-06 RX ORDER — AMLODIPINE BESYLATE 5 MG/1
5 TABLET ORAL
Qty: 90 TABLET | Refills: 1 | Status: SHIPPED | OUTPATIENT
Start: 2023-08-06

## 2023-08-06 NOTE — TELEPHONE ENCOUNTER
Refill Routing Note     Refill Routing Note   Medication(s) are not appropriate for processing by [facility] for the following reason(s):      Non-participating provider    [facility] action(s):  Route Care Due:  None identified            Appointments  past 12m or future 3m with PCP    Date Provider   Last Visit   2/6/2023 Shanice Yang MD   Next Visit   Visit date not found Shanice Yang MD   ED visits in past 90 days: 0        Note composed:10:03 AM 08/06/2023

## 2023-08-07 DIAGNOSIS — Z82.49 FAMILY HISTORY OF HEART DISEASE: ICD-10-CM

## 2023-08-07 RX ORDER — ROSUVASTATIN CALCIUM 10 MG/1
10 TABLET, COATED ORAL DAILY
Qty: 90 TABLET | Refills: 3 | Status: SHIPPED | OUTPATIENT
Start: 2023-08-07 | End: 2024-08-06

## 2023-08-11 ENCOUNTER — PATIENT MESSAGE (OUTPATIENT)
Dept: PAIN MEDICINE | Facility: CLINIC | Age: 68
End: 2023-08-11
Payer: MEDICARE

## 2023-08-23 ENCOUNTER — PATIENT MESSAGE (OUTPATIENT)
Dept: FAMILY MEDICINE | Facility: CLINIC | Age: 68
End: 2023-08-23
Payer: MEDICARE

## 2023-08-23 DIAGNOSIS — G47.9 SLEEP DISORDER: ICD-10-CM

## 2023-08-23 RX ORDER — TRAZODONE HYDROCHLORIDE 50 MG/1
50 TABLET ORAL NIGHTLY
Qty: 90 TABLET | Refills: 3 | Status: SHIPPED | OUTPATIENT
Start: 2023-08-23

## 2023-08-24 ENCOUNTER — PATIENT MESSAGE (OUTPATIENT)
Dept: PAIN MEDICINE | Facility: CLINIC | Age: 68
End: 2023-08-24
Payer: MEDICARE

## 2023-08-24 DIAGNOSIS — M54.12 CERVICAL RADICULITIS: ICD-10-CM

## 2023-08-24 RX ORDER — IBUPROFEN 600 MG/1
600 TABLET ORAL EVERY 6 HOURS PRN
Qty: 60 TABLET | Refills: 0 | Status: SHIPPED | OUTPATIENT
Start: 2023-08-24

## 2023-08-25 ENCOUNTER — TELEPHONE (OUTPATIENT)
Dept: PAIN MEDICINE | Facility: CLINIC | Age: 68
End: 2023-08-25
Payer: MEDICARE

## 2023-08-25 NOTE — TELEPHONE ENCOUNTER
----- Message from Miracle Medina NP sent at 8/25/2023  3:12 PM CDT -----  Regarding: appt  Patient lives in [place] area. We can offer the patient an appt to be seen on Monday in [place] if it is easier for her.   Thank you,  Miracle Medina NP

## 2023-08-29 ENCOUNTER — OFFICE VISIT (OUTPATIENT)
Dept: PAIN MEDICINE | Facility: CLINIC | Age: 68
End: 2023-08-29
Payer: MEDICARE

## 2023-08-29 ENCOUNTER — TELEPHONE (OUTPATIENT)
Dept: PAIN MEDICINE | Facility: CLINIC | Age: 68
End: 2023-08-29
Payer: MEDICARE

## 2023-08-29 VITALS
BODY MASS INDEX: 21.34 KG/M2 | HEART RATE: 67 BPM | WEIGHT: 125 LBS | SYSTOLIC BLOOD PRESSURE: 140 MMHG | HEIGHT: 64 IN | DIASTOLIC BLOOD PRESSURE: 88 MMHG

## 2023-08-29 DIAGNOSIS — M79.18 MYOFASCIAL PAIN SYNDROME, CERVICAL: Primary | ICD-10-CM

## 2023-08-29 DIAGNOSIS — M47.892 OTHER SPONDYLOSIS, CERVICAL REGION: ICD-10-CM

## 2023-08-29 DIAGNOSIS — G24.3 CERVICAL DYSTONIA: ICD-10-CM

## 2023-08-29 DIAGNOSIS — M50.30 DDD (DEGENERATIVE DISC DISEASE), CERVICAL: ICD-10-CM

## 2023-08-29 PROCEDURE — 20552 NJX 1/MLT TRIGGER POINT 1/2: CPT | Mod: PBBFAC,PN

## 2023-08-29 PROCEDURE — 20552 NJX 1/MLT TRIGGER POINT 1/2: CPT | Mod: S$PBB,,,

## 2023-08-29 PROCEDURE — 99999PBSHW PR PBB SHADOW TECHNICAL ONLY FILED TO HB: Mod: PBBFAC,,,

## 2023-08-29 PROCEDURE — 99214 OFFICE O/P EST MOD 30 MIN: CPT | Mod: 25,S$PBB,,

## 2023-08-29 PROCEDURE — 99213 OFFICE O/P EST LOW 20 MIN: CPT | Mod: PBBFAC,PN

## 2023-08-29 PROCEDURE — 99214 PR OFFICE/OUTPT VISIT, EST, LEVL IV, 30-39 MIN: ICD-10-PCS | Mod: 25,S$PBB,,

## 2023-08-29 PROCEDURE — 99999 PR PBB SHADOW E&M-EST. PATIENT-LVL III: CPT | Mod: PBBFAC,,,

## 2023-08-29 PROCEDURE — 20552 TRIGGER POINT INJECTION: ICD-10-PCS | Mod: S$PBB,,,

## 2023-08-29 PROCEDURE — 99999 PR PBB SHADOW E&M-EST. PATIENT-LVL III: ICD-10-PCS | Mod: PBBFAC,,,

## 2023-08-29 PROCEDURE — 99999PBSHW PR PBB SHADOW TECHNICAL ONLY FILED TO HB: ICD-10-PCS | Mod: PBBFAC,,,

## 2023-08-29 RX ORDER — METHYLPREDNISOLONE ACETATE 40 MG/ML
40 INJECTION, SUSPENSION INTRA-ARTICULAR; INTRALESIONAL; INTRAMUSCULAR; SOFT TISSUE
Status: DISCONTINUED | OUTPATIENT
Start: 2023-08-29 | End: 2023-08-29 | Stop reason: HOSPADM

## 2023-08-29 RX ADMIN — METHYLPREDNISOLONE ACETATE 40 MG: 40 INJECTION, SUSPENSION INTRA-ARTICULAR; INTRALESIONAL; INTRAMUSCULAR; SOFT TISSUE at 02:08

## 2023-08-29 NOTE — PROCEDURES
Trigger Point Injection    Performed by: Miracle Medina NP  Authorized by: Miracle Medina, NP    Cervical Paraspinal:  Right  Trapezus:  Right  Pre-Procedure:   Indications:  Diagnostic evaluation and pain relief  Site marked: the procedure site was marked     Timeout: prior to procedure the correct patient, procedure, and site was verified      Local anesthesia used?: Yes    Anesthesia:  Local infiltration  Local anesthetic:  Bupivacaine 0.25% without epinephrine  Anesthetic total (ml):  4    Medications: 40 mg methylPREDNISolone acetate 40 mg/mL      Trigger point injection   Pre-procedure diagnosis: Myofascial trigger points in cervical paraspinous muscles  Post-procedure diagnosis: Same    Timeout performed.  Upon examination, 4 trigger points were noted in the above noted regions.   After the procedure was described and informed consent obtained, the skin over the trigger points was cleaned with isopropyl alcohol. Using a 25-gauge needle, injection of 1ml of 0.25%bupvicaine and 10mg of methylprednisone was injected into each trigger point. The patient noted concordant radiating pain upon injection of her trigger points. The skin was then cleaned and bandages were applied to sites as necessary. Blood loss was <2ml. We observed the patient for 10 minutes after the procedure for any complications, and as there were none noted, the patient was then discharged home with instructions. We advised the patient that during the duration of the local anesthetic effect, this would be the optimal time to perform stretching exercises for the muscles which contain the trigger points. We also advised the patient to continue these exercises daily as this would likely give the best chance of resolution of the trigger points.

## 2023-08-29 NOTE — TELEPHONE ENCOUNTER
----- Message from Stacey M Lefort sent at 8/29/2023 12:54 PM CDT -----  Pt is returning your call at [phone]. Thank you.

## 2023-08-29 NOTE — PROGRESS NOTES
This note was completed with dictation software and grammatical errors may exist.    Referring Physician: No ref. provider found    PCP: Shanice Yang MD      CC: neck pain    INTERVAL HPI:   Edith Jackson is a 67 y.o. female established patient, for the management of neck pain.  The patient is s/p Right Cervical RFA at C3,C4,C5,C6 and denies any relief. The patient reports she had excellent relief of pain with Cervical MBB that lasted an entire day.   The patient continues reports of right sided neck pain that radiates into her shoulders. The patient also endorses anterior neck pain. Pain improves with rest.  She takes NSAIDs with mild benefits.  She has completed physical therapy with some relief. She denies numbness and tingling. The patient denies of any significant changes in her health since her last appointment. The patient also denies of any changes in the character of her pain since her last appointment.  Patient denies of any urinary/fecal incontinence, saddle anesthesia, or weakness.  The patient reports that her pain is a 5/10.     HPI:   Edith Jackson is a 68 y.o. female referred to us for neck pain.  Neck pain has gradually worsened over the past 4 months.  No recent traumatic incident.  She has constant aching, burning, grabbing, sharp, deep pain in her posterior neck pain radiates to her bilateral shoulders.  Pain worsens with sitting, standing, bending, coughing walking.  Pain improves with rest.  She takes NSAIDs with mild benefits.  She has tried physical therapy with minimal benefit.  She underwent trigger point injections recently with minimal benefit.  She recently had MRI of the cervical spine.  He was evaluated by spine surgery referred to us for further interventional procedures.  She denies any worsening weakness.  No bowel bladder changes.    Interventional Procedures:  7/19/2023: Radiofrequency ablation of the C3,4,5,6 medial branch nerves on the right-side- no  relief.   3/17/23: Right C6-7, C7-T1 transforaminal epidural steroid injection under fluoroscopy - no relief.   1/13/2023: C7-T1 cervical interlaminar epidural steroid injection under utilizing fluoroscopy- minimal relief.     ROS:  CONSTITUTIONAL: No fevers, chills, night sweats, wt. loss, appetite changes  SKIN: no rashes or itching  ENT: No headaches, head trauma, vision changes, or eye pain  LYMPH NODES: None noticed   CV: No chest pain, palpitations.   RESP: No shortness of breath, dyspnea on exertion, cough, wheezing, or hemoptysis  GI: No nausea, emesis, diarrhea, constipation, melena, hematochezia, pain.    : No dysuria, hematuria, urgency, or frequency   HEME: No easy bruising, bleeding problems  PSYCHIATRIC: No depression, anxiety, psychosis, hallucinations.  NEURO: No seizures, memory loss, dizziness or difficulty sleeping  MSK:  Positive HPI      Past Medical History:   Diagnosis Date    Arthritis     Asthma     H/O: pneumonia     Hypertension     Hypotension, iatrogenic     post-op    Osteopenia     Post-menopausal bleeding 01/2023    Respiratory distress     Thyroid disease     hypo     Past Surgical History:   Procedure Laterality Date    AUGMENTATION OF BREAST Bilateral     removal    BREAST RECONSTRUCTION      COLONOSCOPY N/A 03/17/2022    Procedure: COLONOSCOPY;  Surgeon: Alessandra Springer MD;  Location: [facility] ENDO;  Service: General;  Laterality: N/A;    EPIDURAL STEROID INJECTION INTO CERVICAL SPINE N/A 01/13/2023    Procedure: Injection-steroid-epidural-cervical;  Surgeon: Larry Thomason MD;  Location: [facility] OR;  Service: Pain Management;  Laterality: N/A;  C7-T1    HIP REPLACEMENT ARTHROPLASTY Right     HYSTEROSCOPY WITH DILATION AND CURETTAGE OF UTERUS N/A 1/30/2023    Procedure: HYSTEROSCOPY, WITH DILATION AND CURETTAGE OF UTERUS;possible myosure;  Surgeon: Carie Dwyer MD;  Location: [facility];  Service: OB/GYN;  Laterality: N/A;    INJECTION OF ANESTHETIC AGENT AROUND MEDIAL BRANCH NERVES  INNERVATING CERVICAL FACET JOINT Right 5/26/2023    Procedure: Block-nerve-medial branch-cervical C3-C4-C5-C6;  Surgeon: Larry Thomason MD;  Location: [facility] OR;  Service: Pain Management;  Laterality: Right;    INJECTION OF ANESTHETIC AGENT AROUND MEDIAL BRANCH NERVES INNERVATING CERVICAL FACET JOINT Right 6/21/2023    Procedure: Block-nerve-medial branch-cervical;  Surgeon: Larry Thomason MD;  Location: [facility] OR;  Service: Pain Management;  Laterality: Right;  c3,4,5,6 Mbb #2    JOINT REPLACEMENT      RADIOFREQUENCY THERMAL COAGULATION OF MEDIAL BRANCH OF POSTERIOR RAMUS OF CERVICAL SPINAL NERVE Right 7/19/2023    Procedure: RADIOFREQUENCY THERMAL COAGULATION, NERVE, SPINAL, CERVICAL, POSTERIOR RAMUS, MEDIAL BRANCH;  Surgeon: Larry Thomason MD;  Location: [facility] OR;  Service: Anesthesiology;  Laterality: Right;  C3,4,5,6 RFA    TRANSFORAMINAL EPIDURAL INJECTION OF STEROID Right 3/17/2023    Procedure: Injection,steroid,cervical,transforaminal,   Right C6-C7, C7-T1;  Surgeon: Larry Thomason MD;  Location: [facility] OR;  Service: Pain Management;  Laterality: Right;     Family History   Problem Relation Age of Onset    Heart disease Mother     Diabetes Mother     Stroke Mother     Heart attack Mother     Thyroid disease Mother     Heart attack Father     Breast cancer Neg Hx     Ovarian cancer Neg Hx      Social History     Socioeconomic History    Marital status: Single   Tobacco Use    Smoking status: Never    Smokeless tobacco: Never   Substance and Sexual Activity    Alcohol use: Yes     Alcohol/week: 2.0 standard drinks of alcohol     Types: 2 Glasses of wine per week     Comment: socially    Drug use: Never     Social Determinants of Health     Financial Resource Strain: Low Risk  (3/11/2022)    Overall Financial Resource Strain (CARDIA)     Difficulty of Paying Living Expenses: Not very hard   Food Insecurity: Unknown (3/11/2022)    Hunger Vital Sign     Worried About Running Out of Food in the Last Year: Patient refused     Ran  "Out of Food in the Last Year: Patient refused   Transportation Needs: Unknown (3/11/2022)    PRAPARE - Transportation     Lack of Transportation (Medical): Patient refused     Lack of Transportation (Non-Medical): Patient refused   Physical Activity: Unknown (3/11/2022)    Exercise Vital Sign     Days of Exercise per Week: Patient refused   Stress: Unknown (3/11/2022)    [employer] [place] [employer] - Occupational Stress Questionnaire     Feeling of Stress : Patient refused   Social Connections: Unknown (3/11/2022)    Social Connection and Isolation Panel [NHANES]     Frequency of Communication with Friends and Family: Patient refused     Frequency of Social Gatherings with Friends and Family: Patient refused     Active Member of Clubs or Organizations: Patient refused     Attends Club or Organization Meetings: Patient refused     Marital Status: Patient refused   Housing Stability: Unknown (3/11/2022)    Housing Stability Vital Sign     Unable to Pay for Housing in the Last Year: Patient refused     Unstable Housing in the Last Year: Patient refused         Medications/Allergies: See med card    Vitals:    08/29/23 1333   BP: (!) 140/88   Pulse: 67   Weight: 56.7 kg (125 lb)   Height: 5' 4" (1.626 m)   PainSc:   3   PainLoc: Neck           Physical exam:    GENERAL: A and O x3, the patient appears well groomed and is in no acute distress.  Skin: No rashes or obvious lesions  HEENT: normocephalic, atraumatic  CARDIOVASCULAR:  Palpable peripheral pulses  LUNGS: easy work of breathing  ABDOMEN: soft, nontender   UPPER EXTREMITIES: Normal alignment, normal range of motion, no atrophy, no skin changes,  hair growth and nail growth normal and equal bilaterally. No swelling, no tenderness.    LOWER EXTREMITIES:  Normal alignment, normal range of motion, no atrophy, no skin changes,  hair growth and nail growth normal and equal bilaterally. No swelling, no tenderness.  CERVICAL SPINE:  Cervical spine: ROM is " limited in flexion, extension and lateral rotation with moderate increased pain.  Spurling's maneuver causes no neck pain to either side.  Myofascial exam:  Tenderness to palpation across cervical paraspinous region bilaterally. R>L.     MENTAL STATUS: normal orientation, speech, language, and fund of knowledge for social situation.  Emotional state appropriate.    MOTOR: Tone and bulk: normal bilateral upper and lower Strength: normal   SENSATION: Light touch and pinprick intact bilaterally  REFLEXES: normal, symmetric, nonbrisk.  Toes down, no clonus. No hoffmans.  GAIT: normal rise, base, steps, and arm swing.        Imaging:  MRI C-spine 11/2022  Impression:     1. Reversal of normal cervical lordosis.  2. Marrow edema along the right C2-3 and left C3-4 facets, presumably degenerative.  3. Multilevel cervical degenerative change including left-sided disc extrusions at C3-4 and C5-6.  Degenerative findings contribute to neural foraminal narrowing severe on the left at C3-4 and C5-6.  4. No high-grade spinal canal stenosis.    Assessment:  Edith Jackson is a 68 y.o.  female established patient for the management of neck pain. The patient is s/p right Cervical RFA without relief. Patient localizes her neck pain to the right side of her neck with radiation into her shoulder blade.  MRI significant for multilevel neural foraminal stenosis and DDD. Treatment plan outlined below.    1. Myofascial pain syndrome, cervical    2. Other spondylosis, cervical region    3. DDD (degenerative disc disease), cervical    4. Cervical dystonia            Plan:  I have stressed the importance of physical activity and exercise to improve overall health  Reviewed pertinent imaging and records with patient  Continue to monitor progress of  right Cervical MBB/RFA C3,C4,C5,C6  Recommended Cervical TPIs to the right side of neck for myofascial pain   Discussed Botox injection for cervical dystonia pending relief with TPIs.   Will  schedule botox injection for cervical dystonia with Dr. Thomason   Follow up after procedure  Miracle Medina, NP

## 2023-09-05 ENCOUNTER — PATIENT MESSAGE (OUTPATIENT)
Dept: PAIN MEDICINE | Facility: CLINIC | Age: 68
End: 2023-09-05
Payer: MEDICARE

## 2023-09-11 DIAGNOSIS — G24.3 CERVICAL DYSTONIA: Primary | ICD-10-CM

## 2023-09-19 ENCOUNTER — PATIENT MESSAGE (OUTPATIENT)
Dept: ADMINISTRATIVE | Facility: HOSPITAL | Age: 68
End: 2023-09-19
Payer: MEDICARE

## 2023-09-27 ENCOUNTER — TELEPHONE (OUTPATIENT)
Dept: PAIN MEDICINE | Facility: CLINIC | Age: 68
End: 2023-09-27

## 2023-09-27 NOTE — TELEPHONE ENCOUNTER
----- Message from Dannanicholas Kelle sent at 9/27/2023  2:25 PM CDT -----  Regarding: Patient Advice  Needs Medical Advice      Who Called: Pt      Would the patient rather a call back or a response via [facility]? Call back    Best Call Back Number: [phone]      Additional Information: Sts had second thoughts on having Botox.   Please Advise - Thank you

## 2023-09-28 ENCOUNTER — TELEPHONE (OUTPATIENT)
Dept: PAIN MEDICINE | Facility: CLINIC | Age: 68
End: 2023-09-28
Payer: MEDICARE

## 2023-09-28 NOTE — TELEPHONE ENCOUNTER
----- Message from Miracle Medina NP sent at 9/28/2023  8:26 AM CDT -----  Regarding: RE: Patient Advice  Please advise the patient that Botox injections are minimally invasive.  The worse outcome of the Botox injections would be no relief.  If the patient does not want to proceed we can repeat steroid injections which was reported minimal relief in the past or I can place a referral to NSGY.  Please offer the patient an appt to come in and discuss if she would like.   Thank you,  Miracle Medina NP   ----- Message -----  From: Chanda Barreto MA  Sent: 9/27/2023   2:52 PM CDT  To: Mriacle Medina NP  Subject: FW: Patient Advice                               Pt states that the TPI did not work that she had on 8/29/23,  Patient states that she is afraid of getting Botox that is schedule in October ,She wants to know what is next since she is nervous getting Botox      ----- Message -----  From: Asim Nina  Sent: 9/27/2023   2:27 PM CDT  To: Paddy Juarez Staff  Subject: Patient Advice                                   Needs Medical Advice      Who Called: Pt      Would the patient rather a call back or a response via [facility]? Call back    Best Call Back Number: [phone]      Additional Information: Sts had second thoughts on having Botox.   Please Advise - Thank you

## 2023-10-02 ENCOUNTER — TELEPHONE (OUTPATIENT)
Dept: FAMILY MEDICINE | Facility: CLINIC | Age: 68
End: 2023-10-02
Payer: MEDICARE

## 2023-10-02 NOTE — TELEPHONE ENCOUNTER
----- Message from Joseph Rogel sent at 10/2/2023 12:01 PM CDT -----  Type: Needs Medical Advice  Who Called:  pt   Symptoms (please be specific):  est care   Best Call Back Number: [phone]    Additional Information: pt stated due to change of location for pt, pt is wanting to change to care to cov and needs to enrique appt asap. Pt stated she prefers female provider, but will see first [facility] if pt has please call back to advise and enrique asap thanks!

## 2023-10-02 NOTE — TELEPHONE ENCOUNTER
Spoke with pt.. pt states that she is needing to be seen due to back spasms.    Pt also requested that I cancel her back and spine appointment on 10/10/2023 bc she is not sure why that appointment was made.

## 2023-10-10 ENCOUNTER — PATIENT MESSAGE (OUTPATIENT)
Dept: PAIN MEDICINE | Facility: CLINIC | Age: 68
End: 2023-10-10

## 2023-10-10 ENCOUNTER — OFFICE VISIT (OUTPATIENT)
Dept: PAIN MEDICINE | Facility: CLINIC | Age: 68
End: 2023-10-10
Payer: MEDICARE

## 2023-10-10 VITALS — HEIGHT: 64 IN | WEIGHT: 125 LBS | BODY MASS INDEX: 21.34 KG/M2

## 2023-10-10 DIAGNOSIS — G24.3 CERVICAL DYSTONIA: Primary | ICD-10-CM

## 2023-10-10 DIAGNOSIS — M50.30 DDD (DEGENERATIVE DISC DISEASE), CERVICAL: ICD-10-CM

## 2023-10-10 DIAGNOSIS — M79.18 MYOFASCIAL PAIN SYNDROME, CERVICAL: ICD-10-CM

## 2023-10-10 DIAGNOSIS — M47.892 OTHER SPONDYLOSIS, CERVICAL REGION: ICD-10-CM

## 2023-10-10 PROCEDURE — 99213 PR OFFICE/OUTPT VISIT, EST, LEVL III, 20-29 MIN: ICD-10-PCS | Mod: S$PBB,,,

## 2023-10-10 PROCEDURE — 99213 OFFICE O/P EST LOW 20 MIN: CPT | Mod: S$PBB,,,

## 2023-10-10 PROCEDURE — 99999 PR PBB SHADOW E&M-EST. PATIENT-LVL III: ICD-10-PCS | Mod: PBBFAC,,,

## 2023-10-10 PROCEDURE — 99213 OFFICE O/P EST LOW 20 MIN: CPT | Mod: PBBFAC,PN

## 2023-10-10 PROCEDURE — 99999 PR PBB SHADOW E&M-EST. PATIENT-LVL III: CPT | Mod: PBBFAC,,,

## 2023-10-10 NOTE — PROGRESS NOTES
Referring Physician: No ref. provider found    PCP: Shanice Yang MD      CC: neck pain    INTERVAL HPI:   Edith Jackson is a 67 y.o. female established patient, for the management of neck pain.  The patient is s/p Right Cervical TPIs without relief. The patient is scheduled for botox injection for cervical dystonia next week with Dr. Thomason.  The patient is concerned the botox injections won't  help and is concerned Dr. Thomason won't know where her pain is.  The patient reports she had excellent relief of pain with Cervical MBB that lasted an entire day.   The patient continues reports of right sided neck pain that radiates into her shoulders. The patient also endorses anterior neck pain. Pain improves with rest.  She takes NSAIDs with mild benefits.  She has completed physical therapy with some relief. She denies numbness and tingling. The patient denies of any significant changes in her health since her last appointment. The patient also denies of any changes in the character of her pain since her last appointment.  Patient denies of any urinary/fecal incontinence, saddle anesthesia, or weakness.  The patient reports that her pain is a 4/10.     HPI:   Edith Jackson is a 68 y.o. female referred to us for neck pain.  Neck pain has gradually worsened over the past 4 months.  No recent traumatic incident.  She has constant aching, burning, grabbing, sharp, deep pain in her posterior neck pain radiates to her bilateral shoulders.  Pain worsens with sitting, standing, bending, coughing walking.  Pain improves with rest.  She takes NSAIDs with mild benefits.  She has tried physical therapy with minimal benefit.  She underwent trigger point injections recently with minimal benefit.  She recently had MRI of the cervical spine.  He was evaluated by spine surgery referred to us for further interventional procedures.  She denies any worsening weakness.  No bowel bladder changes.    Interventional  Procedures:  7/19/2023: Radiofrequency ablation of the C3,4,5,6 medial branch nerves on the right-side- no relief.   3/17/23: Right C6-7, C7-T1 transforaminal epidural steroid injection under fluoroscopy - no relief.   1/13/2023: C7-T1 cervical interlaminar epidural steroid injection under utilizing fluoroscopy- minimal relief.     ROS:  CONSTITUTIONAL: No fevers, chills, night sweats, wt. loss, appetite changes  SKIN: no rashes or itching  ENT: No headaches, head trauma, vision changes, or eye pain  LYMPH NODES: None noticed   CV: No chest pain, palpitations.   RESP: No shortness of breath, dyspnea on exertion, cough, wheezing, or hemoptysis  GI: No nausea, emesis, diarrhea, constipation, melena, hematochezia, pain.    : No dysuria, hematuria, urgency, or frequency   HEME: No easy bruising, bleeding problems  PSYCHIATRIC: No depression, anxiety, psychosis, hallucinations.  NEURO: No seizures, memory loss, dizziness or difficulty sleeping  MSK:  Positive HPI      Past Medical History:   Diagnosis Date    Arthritis     Asthma     H/O: pneumonia     Hypertension     Hypotension, iatrogenic     post-op    Osteopenia     Post-menopausal bleeding 01/2023    Respiratory distress     Thyroid disease     hypo     Past Surgical History:   Procedure Laterality Date    AUGMENTATION OF BREAST Bilateral     removal    BREAST RECONSTRUCTION      COLONOSCOPY N/A 03/17/2022    Procedure: COLONOSCOPY;  Surgeon: Alessandra Springer MD;  Location: [facility];  Service: General;  Laterality: N/A;    EPIDURAL STEROID INJECTION INTO CERVICAL SPINE N/A 01/13/2023    Procedure: Injection-steroid-epidural-cervical;  Surgeon: Larry Thomason MD;  Location: [facility] OR;  Service: Pain Management;  Laterality: N/A;  C7-T1    HIP REPLACEMENT ARTHROPLASTY Right     HYSTEROSCOPY WITH DILATION AND CURETTAGE OF UTERUS N/A 1/30/2023    Procedure: HYSTEROSCOPY, WITH DILATION AND CURETTAGE OF UTERUS;possible myosure;  Surgeon: Carie Dwyer MD;  Location: [facility];  Service: OB/GYN;  Laterality: N/A;    INJECTION OF ANESTHETIC AGENT AROUND MEDIAL BRANCH NERVES INNERVATING CERVICAL FACET JOINT Right 5/26/2023    Procedure: Block-nerve-medial branch-cervical C3-C4-C5-C6;  Surgeon: Larry Thomason MD;  Location: [facility] OR;  Service: Pain Management;  Laterality: Right;    INJECTION OF ANESTHETIC AGENT AROUND MEDIAL BRANCH NERVES INNERVATING CERVICAL FACET JOINT Right 6/21/2023    Procedure: Block-nerve-medial branch-cervical;  Surgeon: Larry Thomason MD;  Location: [facility] OR;  Service: Pain Management;  Laterality: Right;  c3,4,5,6 Mbb #2    JOINT REPLACEMENT      RADIOFREQUENCY THERMAL COAGULATION OF MEDIAL BRANCH OF POSTERIOR RAMUS OF CERVICAL SPINAL NERVE Right 7/19/2023    Procedure: RADIOFREQUENCY THERMAL COAGULATION, NERVE, SPINAL, CERVICAL, POSTERIOR RAMUS, MEDIAL BRANCH;  Surgeon: Larry Thomason MD;  Location: [facility] OR;  Service: Anesthesiology;  Laterality: Right;  C3,4,5,6 RFA    TRANSFORAMINAL EPIDURAL INJECTION OF STEROID Right 3/17/2023    Procedure: Injection,steroid,cervical,transforaminal,   Right C6-C7, C7-T1;  Surgeon: Larry Thomason MD;  Location: [facility] OR;  Service: Pain Management;  Laterality: Right;     Family History   Problem Relation Age of Onset    Heart disease Mother     Diabetes Mother     Stroke Mother     Heart attack Mother     Thyroid disease Mother     Heart attack Father     Breast cancer Neg Hx     Ovarian cancer Neg Hx      Social History     Socioeconomic History    Marital status: Single   Tobacco Use    Smoking status: Never    Smokeless tobacco: Never   Substance and Sexual Activity    Alcohol use: Yes     Alcohol/week: 2.0 standard drinks of alcohol     Types: 2 Glasses of wine per week     Comment: socially    Drug use: Never     Social Determinants of Health     Financial Resource Strain: Low Risk  (3/11/2022)    Overall Financial Resource Strain (CARDIA)     Difficulty of Paying Living Expenses: Not very hard   Food Insecurity: Unknown (3/11/2022)  "   Hunger Vital Sign     Worried About Running Out of Food in the Last Year: Patient refused     Ran Out of Food in the Last Year: Patient refused   Transportation Needs: Unknown (3/11/2022)    PRAPARE - Transportation     Lack of Transportation (Medical): Patient refused     Lack of Transportation (Non-Medical): Patient refused   Physical Activity: Unknown (3/11/2022)    Exercise Vital Sign     Days of Exercise per Week: Patient refused   Stress: Unknown (3/11/2022)    [employer] [place] [employer] - Occupational Stress Questionnaire     Feeling of Stress : Patient refused   Social Connections: Unknown (3/11/2022)    Social Connection and Isolation Panel [NHANES]     Frequency of Communication with Friends and Family: Patient refused     Frequency of Social Gatherings with Friends and Family: Patient refused     Active Member of Clubs or Organizations: Patient refused     Attends Club or Organization Meetings: Patient refused     Marital Status: Patient refused   Housing Stability: Unknown (3/11/2022)    Housing Stability Vital Sign     Unable to Pay for Housing in the Last Year: Patient refused     Unstable Housing in the Last Year: Patient refused         Medications/Allergies: See med card    Vitals:    10/10/23 0900   Weight: 56.7 kg (125 lb)   Height: 5' 4" (1.626 m)   PainSc:   4   PainLoc: Neck           Physical exam:    GENERAL: A and O x3, the patient appears well groomed and is in no acute distress.  Skin: No rashes or obvious lesions  HEENT: normocephalic, atraumatic  CARDIOVASCULAR:  Palpable peripheral pulses  LUNGS: easy work of breathing  ABDOMEN: soft, nontender   UPPER EXTREMITIES: Normal alignment, normal range of motion, no atrophy, no skin changes,  hair growth and nail growth normal and equal bilaterally. No swelling, no tenderness.    LOWER EXTREMITIES:  Normal alignment, normal range of motion, no atrophy, no skin changes,  hair growth and nail growth normal and equal bilaterally. " No swelling, no tenderness.  CERVICAL SPINE:  Cervical spine: ROM is limited in flexion, extension and lateral rotation with moderate increased pain.  Spurling's maneuver causes no neck pain to either side.  Myofascial exam:  Tenderness to palpation across cervical paraspinous region bilaterally. R>L.     MENTAL STATUS: normal orientation, speech, language, and fund of knowledge for social situation.  Emotional state appropriate.    MOTOR: Tone and bulk: normal bilateral upper and lower Strength: normal   SENSATION: Light touch and pinprick intact bilaterally  REFLEXES: normal, symmetric, nonbrisk.  Toes down, no clonus. No hoffmans.  GAIT: normal rise, base, steps, and arm swing.        Imaging:  MRI C-spine 11/2022  Impression:     1. Reversal of normal cervical lordosis.  2. Marrow edema along the right C2-3 and left C3-4 facets, presumably degenerative.  3. Multilevel cervical degenerative change including left-sided disc extrusions at C3-4 and C5-6.  Degenerative findings contribute to neural foraminal narrowing severe on the left at C3-4 and C5-6.  4. No high-grade spinal canal stenosis.    Assessment:  Edith Jackson is a 68 y.o.  female established patient for the management of neck pain. The patient is s/p right Cervical RFA without relief. Patient localizes her neck pain to the right side of her neck with radiation into her shoulder blade.  MRI significant for multilevel neural foraminal stenosis and DDD. Treatment plan outlined below.    1. Cervical dystonia    2. Other spondylosis, cervical region    3. DDD (degenerative disc disease), cervical    4. Myofascial pain syndrome, cervical              Plan:  I have stressed the importance of physical activity and exercise to improve overall health  Reviewed pertinent imaging and records with patient  Continue to monitor progress of  right Cervical MBB/RFA C3,C4,C5,C6  Continue as scheduled for botox injection for cervical dystonia. Reviewed procedure and  expectations of procedure with the patient.   Follow up after procedure  Miracle Medina, NP

## 2023-10-16 ENCOUNTER — OFFICE VISIT (OUTPATIENT)
Dept: PAIN MEDICINE | Facility: CLINIC | Age: 68
End: 2023-10-16
Payer: MEDICARE

## 2023-10-16 VITALS
DIASTOLIC BLOOD PRESSURE: 85 MMHG | HEART RATE: 68 BPM | WEIGHT: 125 LBS | SYSTOLIC BLOOD PRESSURE: 133 MMHG | BODY MASS INDEX: 21.34 KG/M2 | HEIGHT: 64 IN

## 2023-10-16 DIAGNOSIS — M50.30 DDD (DEGENERATIVE DISC DISEASE), CERVICAL: ICD-10-CM

## 2023-10-16 DIAGNOSIS — G24.3 CERVICAL DYSTONIA: Primary | ICD-10-CM

## 2023-10-16 DIAGNOSIS — M47.892 OTHER SPONDYLOSIS, CERVICAL REGION: ICD-10-CM

## 2023-10-16 PROCEDURE — 64616 CHEMODENERV MUSC NECK DYSTON: CPT | Mod: PBBFAC,PN,RT | Performed by: ANESTHESIOLOGY

## 2023-10-16 PROCEDURE — 99999PBSHW PR PBB SHADOW TECHNICAL ONLY FILED TO HB: Mod: PBBFAC,,,

## 2023-10-16 PROCEDURE — 99999 PR PBB SHADOW E&M-EST. PATIENT-LVL III: ICD-10-PCS | Mod: PBBFAC,,, | Performed by: ANESTHESIOLOGY

## 2023-10-16 PROCEDURE — 99999 PR PBB SHADOW E&M-EST. PATIENT-LVL III: CPT | Mod: PBBFAC,,, | Performed by: ANESTHESIOLOGY

## 2023-10-16 PROCEDURE — 99999PBSHW PR PBB SHADOW TECHNICAL ONLY FILED TO HB: ICD-10-PCS | Mod: PBBFAC,,,

## 2023-10-16 PROCEDURE — 99214 OFFICE O/P EST MOD 30 MIN: CPT | Mod: S$PBB,25,, | Performed by: ANESTHESIOLOGY

## 2023-10-16 PROCEDURE — 64616 BOTULINUM INJECTION: ICD-10-PCS | Mod: S$PBB,RT,, | Performed by: ANESTHESIOLOGY

## 2023-10-16 PROCEDURE — 99213 OFFICE O/P EST LOW 20 MIN: CPT | Mod: PBBFAC,PN | Performed by: ANESTHESIOLOGY

## 2023-10-16 PROCEDURE — 99214 PR OFFICE/OUTPT VISIT, EST, LEVL IV, 30-39 MIN: ICD-10-PCS | Mod: S$PBB,25,, | Performed by: ANESTHESIOLOGY

## 2023-10-16 RX ADMIN — ONABOTULINUMTOXINA 200 UNITS: 100 INJECTION, POWDER, LYOPHILIZED, FOR SOLUTION INTRADERMAL; INTRAMUSCULAR at 10:10

## 2023-10-16 NOTE — PROGRESS NOTES
Referring Physician: Larry Thomason MD    PCP: Shanice Yang MD      CC: neck pain    INTERVAL History:   Edith Jackson is a 67 y.o. female established patient, for the management of neck pain.  The patient is s/p Right Cervical TPIs without relief. The patient is here for botox injection for cervical dystonia.  The patient continues reports of right sided neck pain that radiates into her shoulders. The patient also endorses anterior neck pain. Pain improves with rest.  She takes NSAIDs with mild benefits.  She has completed physical therapy with some relief. She denies numbness and tingling. The patient denies of any significant changes in her health since her last appointment. The patient also denies of any changes in the character of her pain since her last appointment.  Patient denies of any urinary/fecal incontinence, saddle anesthesia, or weakness.      Priro HPI:   Edith Jackson is a 68 y.o. female referred to us for neck pain.  Neck pain has gradually worsened over the past 4 months.  No recent traumatic incident.  She has constant aching, burning, grabbing, sharp, deep pain in her posterior neck pain radiates to her bilateral shoulders.  Pain worsens with sitting, standing, bending, coughing walking.  Pain improves with rest.  She takes NSAIDs with mild benefits.  She has tried physical therapy with minimal benefit.  She underwent trigger point injections recently with minimal benefit.  She recently had MRI of the cervical spine.  He was evaluated by spine surgery referred to us for further interventional procedures.  She denies any worsening weakness.  No bowel bladder changes.    Interventional Procedures:  7/19/2023: Radiofrequency ablation of the C3,4,5,6 medial branch nerves on the right-side- no relief.   3/17/23: Right C6-7, C7-T1 transforaminal epidural steroid injection under fluoroscopy - no relief.   1/13/2023: C7-T1 cervical interlaminar epidural steroid injection  under utilizing fluoroscopy- minimal relief.     ROS:  CONSTITUTIONAL: No fevers, chills, night sweats, wt. loss, appetite changes  SKIN: no rashes or itching  ENT: No headaches, head trauma, vision changes, or eye pain  LYMPH NODES: None noticed   CV: No chest pain, palpitations.   RESP: No shortness of breath, dyspnea on exertion, cough, wheezing, or hemoptysis  GI: No nausea, emesis, diarrhea, constipation, melena, hematochezia, pain.    : No dysuria, hematuria, urgency, or frequency   HEME: No easy bruising, bleeding problems  PSYCHIATRIC: No depression, anxiety, psychosis, hallucinations.  NEURO: No seizures, memory loss, dizziness or difficulty sleeping  MSK:  Positive HPI      Past Medical History:   Diagnosis Date    Arthritis     Asthma     H/O: pneumonia     Hypertension     Hypotension, iatrogenic     post-op    Osteopenia     Post-menopausal bleeding 01/2023    Respiratory distress     Thyroid disease     hypo     Past Surgical History:   Procedure Laterality Date    AUGMENTATION OF BREAST Bilateral     removal    BREAST RECONSTRUCTION      COLONOSCOPY N/A 03/17/2022    Procedure: COLONOSCOPY;  Surgeon: Alessandra Springer MD;  [facility]: [facility];  Service: General;  Laterality: N/A;    EPIDURAL STEROID INJECTION INTO CERVICAL SPINE N/A 01/13/2023    Procedure: Injection-steroid-epidural-cervical;  Surgeon: Larry Thomason MD;  Location: [facility] OR;  Service: Pain Management;  Laterality: N/A;  C7-T1    HIP REPLACEMENT ARTHROPLASTY Right     HYSTEROSCOPY WITH DILATION AND CURETTAGE OF UTERUS N/A 1/30/2023    Procedure: HYSTEROSCOPY, WITH DILATION AND CURETTAGE OF UTERUS;possible myosure;  Surgeon: Carie Dwyer MD;  Location: [facility];  Service: OB/GYN;  Laterality: N/A;    INJECTION OF ANESTHETIC AGENT AROUND MEDIAL BRANCH NERVES INNERVATING CERVICAL FACET JOINT Right 5/26/2023    Procedure: Block-nerve-medial branch-cervical C3-C4-C5-C6;  Surgeon: Larry Thomason MD;  Location: [facility] OR;  Service: Pain  Management;  Laterality: Right;    INJECTION OF ANESTHETIC AGENT AROUND MEDIAL BRANCH NERVES INNERVATING CERVICAL FACET JOINT Right 6/21/2023    Procedure: Block-nerve-medial branch-cervical;  Surgeon: Larry Thomason MD;  Location: [facility] OR;  Service: Pain Management;  Laterality: Right;  c3,4,5,6 Mbb #2    JOINT REPLACEMENT      RADIOFREQUENCY THERMAL COAGULATION OF MEDIAL BRANCH OF POSTERIOR RAMUS OF CERVICAL SPINAL NERVE Right 7/19/2023    Procedure: RADIOFREQUENCY THERMAL COAGULATION, NERVE, SPINAL, CERVICAL, POSTERIOR RAMUS, MEDIAL BRANCH;  Surgeon: Larry Thomason MD;  Location: [facility] OR;  Service: Anesthesiology;  Laterality: Right;  C3,4,5,6 RFA    TRANSFORAMINAL EPIDURAL INJECTION OF STEROID Right 3/17/2023    Procedure: Injection,steroid,cervical,transforaminal,   Right C6-C7, C7-T1;  Surgeon: Larry Thomason MD;  Location[facility] OR;  Service: Pain Management;  Laterality: Right;     Family History   Problem Relation Age of Onset    Heart disease Mother     Diabetes Mother     Stroke Mother     Heart attack Mother     Thyroid disease Mother     Heart attack Father     Breast cancer Neg Hx     Ovarian cancer Neg Hx      Social History     Socioeconomic History    Marital status: Single   Tobacco Use    Smoking status: Never    Smokeless tobacco: Never   Substance and Sexual Activity    Alcohol use: Yes     Alcohol/week: 2.0 standard drinks of alcohol     Types: 2 Glasses of wine per week     Comment: socially    Drug use: Never     Social Determinants of Health     Financial Resource Strain: Low Risk  (3/11/2022)    Overall Financial Resource Strain (CARDIA)     Difficulty of Paying Living Expenses: Not very hard   Food Insecurity: Unknown (3/11/2022)    Hunger Vital Sign     Worried About Running Out of Food in the Last Year: Patient refused     Ran Out of Food in the Last Year: Patient refused   Transportation Needs: Unknown (3/11/2022)    PRAPARE - Transportation     Lack of Transportation (Medical): Patient refused  "    Lack of Transportation (Non-Medical): Patient refused   Physical Activity: Unknown (3/11/2022)    Exercise Vital Sign     Days of Exercise per Week: Patient refused   Stress: Unknown (3/11/2022)    [employer] [place] [employer] - Occupational Stress Questionnaire     Feeling of Stress : Patient refused   Social Connections: Unknown (3/11/2022)    Social Connection and Isolation Panel [NHANES]     Frequency of Communication with Friends and Family: Patient refused     Frequency of Social Gatherings with Friends and Family: Patient refused     Active Member of Clubs or Organizations: Patient refused     Attends Club or Organization Meetings: Patient refused     Marital Status: Patient refused   Housing Stability: Unknown (3/11/2022)    Housing Stability Vital Sign     Unable to Pay for Housing in the Last Year: Patient refused     Unstable Housing in the Last Year: Patient refused         Medications/Allergies: See med card    Vitals:    10/16/23 1032   BP: 133/85   Pulse: 68   Weight: 56.7 kg (125 lb)   Height: 5' 4" (1.626 m)   PainSc:   4   PainLoc: Neck           Physical exam:    GENERAL: A and O x3, the patient appears well groomed and is in no acute distress.  Skin: No rashes or obvious lesions  HEENT: normocephalic, atraumatic  CARDIOVASCULAR:  Palpable peripheral pulses  LUNGS: easy work of breathing  ABDOMEN: soft, nontender   UPPER EXTREMITIES: Normal alignment, normal range of motion, no atrophy, no skin changes,  hair growth and nail growth normal and equal bilaterally. No swelling, no tenderness.    LOWER EXTREMITIES:  Normal alignment, normal range of motion, no atrophy, no skin changes,  hair growth and nail growth normal and equal bilaterally. No swelling, no tenderness.  CERVICAL SPINE:  Cervical spine: ROM is limited in flexion, extension and lateral rotation with moderate increased pain.  Spurling's maneuver causes no neck pain to either side.  Myofascial exam:  Tenderness to " palpation across cervical paraspinous region bilaterally. R>L.     MENTAL STATUS: normal orientation, speech, language, and fund of knowledge for social situation.  Emotional state appropriate.    MOTOR: Tone and bulk: normal bilateral upper and lower Strength: normal   SENSATION: Light touch and pinprick intact bilaterally  REFLEXES: normal, symmetric, nonbrisk.  Toes down, no clonus. No hoffmans.  GAIT: normal rise, base, steps, and arm swing.        Imaging:  MRI C-spine 11/2022  Impression:     1. Reversal of normal cervical lordosis.  2. Marrow edema along the right C2-3 and left C3-4 facets, presumably degenerative.  3. Multilevel cervical degenerative change including left-sided disc extrusions at C3-4 and C5-6.  Degenerative findings contribute to neural foraminal narrowing severe on the left at C3-4 and C5-6.  4. No high-grade spinal canal stenosis.    Assessment:  Edith Jackson is a 68 y.o.  female established patient for the management of neck pain. The patient is s/p right Cervical RFA without relief. Patient localizes her neck pain to the right side of her neck with radiation into her shoulder blade.  MRI significant for multilevel neural foraminal stenosis and DDD. Treatment plan outlined below.    1. Cervical dystonia    2. Other spondylosis, cervical region    3. DDD (degenerative disc disease), cervical          Plan:  I have stressed the importance of physical activity and exercise to improve overall health  Reviewed pertinent imaging and records with patient  Continue to monitor progress of  right Cervical MBB/RFA C3,C4,C5,C6  Perform botox injection for cervical dystonia. Reviewed procedure and expectations of procedure with the patient.   She will contact us in one month and update us on her progress with botox treatments.   If helpful, will make follow up for repeat injections

## 2023-10-16 NOTE — PROCEDURES
Botulinum Injection  Location: Neck    Date/Time: 10/16/2023 10:30 AM    Performed by: Larry Thomason MD  Authorized by: Larry Thomason MD      Consent:      Consent obtained:  Verbal     Consent given by:  Patient     Risks discussed:  Pain     Alternatives discussed:  No treatment    Universal protocol:      Relevant documents present and verified:  Yes       Site/side verified:  Yes       Immediately prior to procedure a time out was called:  Yes       Patient identity confirmed:  Verbally with patient  Procedure details:      EMG used?:  No     Electrical stimulation used?:  NoNo     Diluted by:  Preservative free saline     Laterality: Right     Toxin (Brand):  [id number]-A (Botox)     Total number of units available:  200     Right splenius cervicis:  50 units divided amongst site(s)     Right upper trapezius:  50 units divided amongst site(s)     Right horizontal trapezius:  25 units divided amongst site(s)     Right levator scapulae:  25 units divided amongst site(s)       Total units injected:  150     Total units wasted:  50    Medications: 200 Units onabotulinumtoxina 100 unit    Post-procedure details:      Patient tolerance of procedure:  Tolerated well, no immediate complications

## 2023-10-23 DIAGNOSIS — M50.30 DDD (DEGENERATIVE DISC DISEASE), CERVICAL: ICD-10-CM

## 2023-10-23 DIAGNOSIS — G24.3 CERVICAL DYSTONIA: Primary | ICD-10-CM

## 2023-10-23 DIAGNOSIS — M79.18 MYOFASCIAL PAIN SYNDROME, CERVICAL: ICD-10-CM

## 2023-10-25 DIAGNOSIS — M50.30 DDD (DEGENERATIVE DISC DISEASE), CERVICAL: ICD-10-CM

## 2023-10-25 DIAGNOSIS — M79.18 MYOFASCIAL PAIN SYNDROME, CERVICAL: Primary | ICD-10-CM

## 2023-10-25 DIAGNOSIS — G24.3 CERVICAL DYSTONIA: ICD-10-CM

## 2023-11-14 ENCOUNTER — CLINICAL SUPPORT (OUTPATIENT)
Dept: REHABILITATION | Facility: HOSPITAL | Age: 68
End: 2023-11-14
Payer: MEDICARE

## 2023-11-14 DIAGNOSIS — R29.898 DECREASED RANGE OF MOTION OF NECK: Primary | ICD-10-CM

## 2023-11-14 DIAGNOSIS — M50.30 DDD (DEGENERATIVE DISC DISEASE), CERVICAL: ICD-10-CM

## 2023-11-14 DIAGNOSIS — R29.898 IMPAIRED STRENGTH OF UPPER EXTREMITY: ICD-10-CM

## 2023-11-14 DIAGNOSIS — G24.3 CERVICAL DYSTONIA: ICD-10-CM

## 2023-11-14 DIAGNOSIS — M79.18 MYOFASCIAL PAIN SYNDROME, CERVICAL: ICD-10-CM

## 2023-11-14 PROCEDURE — 97161 PT EVAL LOW COMPLEX 20 MIN: CPT | Mod: PO | Performed by: PHYSICAL THERAPIST

## 2023-11-14 PROCEDURE — 97110 THERAPEUTIC EXERCISES: CPT | Mod: PO | Performed by: PHYSICAL THERAPIST

## 2023-11-14 NOTE — PATIENT INSTRUCTIONS
Forward Walk    Walk forward with one leg. Strike pool bottom with heel. Rolling over foot, bring other leg forward. You can also try backwards.  Session: Walk 3 minutes. Do 3 sessions per week.      Walk Backward    Walking backward, maintain proper posture. Keep step length equal and opposite arm and leg motion.  Session: Walk 3 minutes. Do 3 sessions per week.      Side Step    Step to the side then bring other leg to it. Reverse directions.  Session: Walk 3 minutes to each side. Do 3 sessions per week.      Standing: Unilateral    Stand, one heel on lowest step, leg straight, standing leg slightly bent. Slowly lean forward, keeping back straight. Hold 30 seconds.  Repeat 3 times each leg per session. Do 3 sessions per week.        Quads / HF, Standing    Stand, holding on tot he pool wall. Flex the involved knee by placing on the second step. Lean back until you feel a stretch in the front of the thigh. Maintain hip extension. Hold 30 seconds.  Repeat 3 times each leg per session. Do 3 sessions per week.      Calf Stretch    Stand facing the pool wall, holding onto the edge of the pool with elbows straight and legs together. Take one step forward with the uninvolved leg, touching the toes against the pool wall, while bending the elbows as the trunk moves forward. Keep the heel of the back foot on the floor. Push the hips forward and the back heel down. Hold 30 seconds.  Repeat 3 times each leg per session. Do 3 sessions per week.      Hip Flexor Stretch    Place hand on wall for support. Place one leg in front of other, keeping the back knee straight and the front knee bent. Press the hip of the back leg forward while keeping the trunk upright, feeling the stretching in the front of the hip. Hold 30 seconds.   Repeat 3 times each leg per session. Do 3 sessions per week.      Calf Raise: Bilateral (Standing)    In the water, stand on two feet and rise on ball of foot. Do not let ankle roll out. Slowly return to  start and repeat.  Repeat 20 times each leg per session. Do 3 sessions per week.      Standing Glute Squeeze    Isometrically contract the buttocks, squeezing them together tightly. Hold for a count of 5, then relax.  Repeat 20 times each leg per session. Do 3 sessions per week.      Hip Extension, Knee Bent    Stand with the stomach against the pool wall. Holding onto the edge, slowly flex the knee and extend the leg backward, maintaining neutral hip alignment.  Repeat 20 times each leg per session. Do 3 sessions per week.      Hamstring Pull-Back    Stand with the back against the pool wall, hold onto the wall, and flex the hip to 90 degrees. Flex and extend the knee while maintaining hip flexion.  Repeat 20 times each leg per session. Do 3 sessions per week.      Hip Flexion, Knee Straight    Stand with feet together with one side toward the pool wall, holding on with the near arm. Gently raise the opposite leg straight out in front of the body, flexing only from the hip. Keep trunk upright and the head facing forward. Knee of the support leg should be slightly bent. Return to starting position.  Repeat 20 times each leg per session. Do 3 sessions per week.      Hip Extension, Knee Straight    Stand with feet together with one side toward the pool wall, holding on with the near arm. Gently raise the opposite leg straight backward, extending only from the hip, tightening the buttock. Keep trunk upright and the head facing forward. Knee of the support leg should be slightly bent. Return to starting position.  Repeat 20 times each LE per session. Do 3 sessions per week.      Hip Lateral Abduction / Adduction    Stand holding onto the pool wall, an arm's length away. Gently raise leg out to side. Keep knee straight. Return to start.  Repeat sequence 20 times each leg per session. Do 3 sessions per week.      Knee Flexion / Extension    Stand facing the pool wall with the fronts of the thighs and hips touching the  wall. Keep the trunk upright with the knees and hips in vertical alignment. Gently flex the knee as far as possible, pressing the heel toward the buttocks.  Repeat sequence 20 times each leg per session. Do 3 sessions per week.      Seated Leg Extension    Assume vertical position. Flex the hip to 90 degrees (seated position). Keeping the thighs together, alternately flex and extend the knees.  Repeat 20 times each leg per set. Do 1 sets per session. Do 3 sessions per week.      Lunge Forward    With front foot on low step, lunge forward, bending both knees. Return by straightening knee and pushing back with forward foot.  Repeat 20 times each side per session. Do 3 sessions per week.      Hip Circumduction     Stand holding onto the pool wall, an arm's length away. Gently raise one leg to the side. Move the leg in a clockwise circular pattern from the hip, keeping the leg straight. Keep the trunk upright and the head forward. Repeat in a counterclockwise direction.  Repeat 20 times each side per session. Do 3 sessions per week.      Front to Back Weight Shifting    Stand with one foot in front of the other. Shift body weight from the front leg to the back leg. Shift the body weight from the back leg to the front leg. Repeat with other foot forward. Gradually progress to a normal step length.  Repeat 20 times each leg per set. Do 1 sets per session. Do 3 sessions per week.      Lateral Lunge    Hands on hips or in front of you, back straight, step to side and bend your knee. Return to start and lunge to the other side.  Do 20 times alternating legs per set. Do 1 sets per session. Do 3 set per week.      Sit Cycle    Assume vertical position. Flex the hips to 90 degrees (seated position). Performa a cycling action with the legs, keeping the trunk vertical and the legs in front of the body. Use Noodle or sit on steps.  Repeat 20 times each leg per set. Do 1 sets per session. Do 3 sessions per  week.      Marching    Lift right leg toward chest with knee bent. Return to start and alternate legs.  Repeat 20 times each leg per set. Do 1 sets per session. Do 3 sessions per week.      Resistive Shoulder Flexion    Hold resistive devices in the hands at the sides, palms forward. Raise the involved arm in front of the body and lift it towards the surface of the water, keeping the elbow straight but not locked. Return to the original position.  Repeat 20 times each leg per set. Do 1 sets per session. Do 3 sessions per week.      Resistive Shoulder Extension    Hold resistive device in the hands at the sides, palms back. Lift the involved arm posteriorly toward the surface of the water, keeping the elbow straight but not locked. Return to original position.  Repeat 20 times each leg per set. Do 1 sets per session. Do 3 sessions per week.      Resistive  Horizontal Abduction and Adduction    Hold resistive paddles in front of the body at shoulder level, with the elbows slightly flexed. With palms facing posteriorly, move the hands away from the midline of the body (abduction). Turn the palms forward and adduct the arms to return to the original position.  Repeat 20 times each leg per set. Do 1 sets per session. Do 3 sessions per week.      Rowing    Secure stretch bands to the ladder or any other fixed object in the pool and face the ladder, holding the handles in both hands. Place one foot on the ladder for support. Gently pull the hands back at the chest level, leading with the elbows and adducting the scapulae. Return to original position.  Repeat 20 times each leg per set. Do 1 sets per session. Do 3 sessions per week.      Upright Rows    Stand on one end of the stretch band and hold the handle with both hands. Leading with the elbow, pull the hands toward the surface of the water, keeping the hands close to the body. Return to the original position.  Repeat 20 times each leg per set. Do 1 sets per session. Do  3 sessions per week.      Combined Elbow Bend    Stand with the hands at the side and the palms facing forward. Flex the elbows until the hands touch the shoulder. Turn the hands until the palms are facing down. Extend the elbows, keeping them close to the body.  Repeat 20 times each leg per set. Do 1 sets per session. Do 3 sessions per week.      Resistive Shoulder Abduction and Adduction    Hold resistive devices out to the sides slightly below shoulder level. Pull the arms toward the hips (adduction) maintaining elbow extension throughout the exercise. Do not twist or rotate the trunk. Return to the original position (abduction).  Repeat 20 times each leg per set. Do 1 sets per session. Do 3 sessions per week.      Lateral Pull-Downs    Using flotation bells, stand with feet shoulder-width apart, the knees slightly bent, and the arms in front of the body. Maintain elbow flexion throughout the exercise, and do not twist or rotate the trunk. Slowly pull the arms down toward the sides. Return to the starting position.  Repeat 20 times each leg per set. Do 1 sets per session. Do 3 sessions per week.      Standing Crunches    Stand upright with the pelvis tilted back. Hold a ball or flotation device snugly against the chest. Gently contract the abdominal muscles to flex the spine. Hold the contraction for 4 counts, then relax for 4 counts. Maintain pelvic tilt throughout.  Repeat 20 times each leg per set. Do 1 sets per session. Do 3 sessions per week.      Triceps Extension    Stand upright with the elbow on the involved side flexed, holding a flotation device in the hand with the palm down. Hold onto the edge of the pool with the opposite hand. Keeping the elbow close to the side of the body, extend the forearm at the elbow. Return to the original position.  Repeat 20 times each leg per set. Do 1 sets per session. Do 3 sessions per week.      Resistive Band Crunches    Secure a stretch band to the pool ladder or any  other stable attachment. Stand with the feet shoulder-width apart facing the ladder, and hold the stretch band with stable (unmoving) flexed arms. Slowly contract the abdominals to flex the spine, pulling on the band. Hold 3 seconds, then relax.  Repeat 20 times each leg per set. Do 1 sets per session. Do 3 sessions per week.      Push Offs    Stand with the feet shoulder-width apart facing the pool wall. Hold onto the edge of the pool wall with both hands at arm's length. Keeping the body straight (no arch in the back), lean forward bending the arms until the chest touches or is close to the wall. Do not move the feet and keep heels on the tawana. Push off with both hands to the starting position. All movements should flow smoothly and continuously.  Repeat 20 times each leg per set. Do 1 sets per session. Do 3 sessions per week.      Cross Shoulder Stretch    Reach across the chest with the involved arm. Place other hand above the elbow and gently pull the arm across the chest. Hold 30 seconds, the relax. Repeat on the other side.  Repeat 3 times each leg per set. Do 1 sets per session. Do 3 sessions per week.      Thigh Side Bends     Stand with the feet shoulder-width apart and the hands at the sides. Slowly slide the palm of the hand on the uninvolved side down the side of the leg to the knee, laterally flexing the spine. Return the original position.  Repeat 20 times each leg per set. Do 1 sets per session. Do 3 sessions per week.      Shoulder Extension, Elbows Bent    Place the back against a parallel bar. Hold onto the bar with both hands, palms back. Keep the feet shoulder-width apart. Gently lower the body by bending the knees until a stretch is felt in the shoulders. Hold 3 seconds then relax.  Repeat 20 times each leg per set. Do 1 sets per session. Do 3 sessions per week.      Side Arm Pull    Secure a stretch band to the ladder or any other fixed object in the pool, and stand with the uninvolved side of  the body towards the ladder. Place the uninvolved hand on the ladder for support. Reach across the body with the other hand, grasp the handles of the stretch band, and gently abduct the arm, keeping the elbow straight. Return to the original start position.  Repeat 20 times each leg per set. Do 1 sets per session. Do 3 sessions per week.      Wand Shoulder Stretch     Hold the wand with both hands behind the back and shoulder-width apart. Lift the arms upward until a stretch is felt in the involved shoulder. Hold 3 seconds, then lower arms and relax.  Repeat 20 times each leg per set. Do 1 sets per session. Do 3 sessions per week.    From [facility].

## 2023-11-16 PROBLEM — R29.898 IMPAIRED STRENGTH OF UPPER EXTREMITY: Status: ACTIVE | Noted: 2023-11-16

## 2023-11-16 PROBLEM — R29.898 DECREASED RANGE OF MOTION OF NECK: Status: ACTIVE | Noted: 2023-11-16
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
## 2023-11-28 ENCOUNTER — CLINICAL SUPPORT (OUTPATIENT)
Dept: REHABILITATION | Facility: HOSPITAL | Age: 68
End: 2023-11-28
Payer: MEDICARE

## 2023-11-28 DIAGNOSIS — R29.898 DECREASED RANGE OF MOTION OF NECK: Primary | ICD-10-CM

## 2023-11-28 DIAGNOSIS — R29.898 IMPAIRED STRENGTH OF UPPER EXTREMITY: ICD-10-CM

## 2023-11-28 PROCEDURE — 97112 NEUROMUSCULAR REEDUCATION: CPT | Mod: PO,CQ

## 2023-11-28 PROCEDURE — 97140 MANUAL THERAPY 1/> REGIONS: CPT | Mod: PO,CQ

## 2023-11-28 PROCEDURE — 97110 THERAPEUTIC EXERCISES: CPT | Mod: PO,CQ

## 2023-11-28 NOTE — PROGRESS NOTES
[facility]   Physical Therapy Treatment Note     Name: Edith Rodriguez Jackson  Clinic Number: [id number]    Therapy Diagnosis:   Encounter Diagnoses   Name Primary?    Decreased range of motion of neck Yes    Impaired strength of upper extremity      Physician: Miracle Medina NP    Visit Date: 11/28/2023  Physician Orders: PT Eval and Treat   Question Answer   Post Surgical? No   Eval and Treat Yes   Type of Therapy Aquatic Therapy   Location: Neck      Medical Diagnosis from Referral: Cervical dystonia [G24.3]   DDD (degenerative disc disease), cervical [M50.30]   Myofascial pain syndrome, cervical [M79.18]   Evaluation Date: 11/14/2023  Authorization Period Expiration:   G24.3 (ICD-10-CM) - Cervical dystonia   M50.30 (ICD-10-CM) - DDD (degenerative disc disease), cervical   M79.18 (ICD-10-CM) - Myofascial pain syndrome, cervical      Plan of Care Expiration: 1/12/2023  Progress Note Due: 12/13/2023  Date of Surgery: na  Visit # / Visits authorized: 2/ 20   FOTO: 1/ 3     Time In: 11:00  Time Out: 12:00  Total Billable Time: 60 minutes  Precautions: Standard       SUBJECTIVE     Pt reports: that she continues to have neck and shld pn.  She was compliant with home exercise program.  Response to previous treatment: no adverse effects  Functional change: none to date    Pain: 6/10  Location: bilateral neck      OBJECTIVE     Objective Measures updated at progress report unless specified.     Treatment     Edith received the treatments listed below:      therapeutic exercises to develop strength, endurance, ROM, flexibility, posture, and core stabilization for 20 minutes including:  Pulleys flex, ABD 2 min each  UT stretch 3 x 30 sec  Lev Scap stretch 3 x 30 sec  Supine chin tuck 20x  Supine flex/ext 20x  Supine rotation L/R 20x    manual therapy techniques: Joint mobilizations, Manual traction, and Myofacial release were applied to the: cervical area for 15 minutes, including:  manual  intermittent c-tx, passive mobilization of B UT, Lev Scap, Sub-occipital release    neuromuscular re-education activities to improve: Coordination, Kinesthetic, Sense, Proprioception, and Posture for 25 minutes. The following activities were included:  Supine ISO retraction 20x   Seated ISO ext with yellow t-band 20x  Seated ISO flex 20x 3 sec hold  Seated ISO R/L side bending 20x 3 sec hold  Seated ISO rotation R/L 20x 3 sec hold    therapeutic activities to improve functional performance for 00  minutes, including:            Patient Education and Home Exercises     Home Exercises Provided and Patient Education Provided     Education provided:   - effects of therapy    Written Home Exercises Provided: Patient instructed to cont prior HEP. Exercises were reviewed and Edith was able to demonstrate them prior to the end of the session.  Edith demonstrated good  understanding of the education provided. See EMR under Patient Instructions for exercises provided during therapy sessions    ASSESSMENT     Edith bernardo today's tx with ther ex, neuromuscular re-ed and manual intervention well. Her AROM is limited by pn and reports of tightness at this time, however she bernardo manual motion well. Gentle strengthening and stretching initiated today with no adverse effects.    Edith Is progressing well towards her goals.   Pt prognosis is Good.     Pt will continue to benefit from skilled outpatient physical therapy to address the deficits listed in the problem list box on initial evaluation, provide pt/family education and to maximize pt's level of independence in the home and community environment.     Pt's spiritual, cultural and educational needs considered and pt agreeable to plan of care and goals.     Anticipated barriers to physical therapy: chronic, age, prior therapy    Goals: Short Term Goals: 4 weeks   -Improve right lat flexion by 10 deg for improving mobility.  -Improve left lat flexion by 10 deg for  improving mobility.  -Neck strength improved to grossly 4/5 for improved postural control.     Long Term Goals: 8 weeks   Bilateral UE strength improved to grossly 4/5 for improving postural support.  Pt (I) in comprehensive AQ HEP for d/c.    PLAN     Plan of care Certification: 11/14/2023 to 1/12/2023.     Outpatient Physical Therapy 2 times weekly for 8 weeks to include the following interventions: Aquatic Therapy, Electrical Stimulation IFC, Manual Therapy, Moist Heat/ Ice, Neuromuscular Re-ed, Patient Education, Self Care, Therapeutic Activities, Therapeutic Exercise, and Ultrasound.    John Costello, PTA

## 2023-12-01 ENCOUNTER — CLINICAL SUPPORT (OUTPATIENT)
Dept: REHABILITATION | Facility: HOSPITAL | Age: 68
End: 2023-12-01
Payer: MEDICARE

## 2023-12-01 DIAGNOSIS — R29.898 DECREASED RANGE OF MOTION OF NECK: Primary | ICD-10-CM

## 2023-12-01 DIAGNOSIS — R29.898 IMPAIRED STRENGTH OF UPPER EXTREMITY: ICD-10-CM

## 2023-12-01 PROCEDURE — 97113 AQUATIC THERAPY/EXERCISES: CPT | Mod: PO,CQ

## 2023-12-01 NOTE — PROGRESS NOTES
"[facility]   Physical Therapy AquaticTreatment Note     Name: Edith Jackson  Clinic Number: [id number]    Therapy Diagnosis:   Encounter Diagnoses   Name Primary?    Decreased range of motion of neck Yes    Impaired strength of upper extremity      Physician: Miracle Medina NP    Visit Date: 12/1/2023  Physician Orders: PT Eval and Treat   Question Answer   Post Surgical? No   Eval and Treat Yes   Type of Therapy Aquatic Therapy   Location: Neck      Medical Diagnosis from Referral: Cervical dystonia [G24.3]   DDD (degenerative disc disease), cervical [M50.30]   Myofascial pain syndrome, cervical [M79.18]   Evaluation Date: 11/14/2023  Authorization Period Expiration:   G24.3 (ICD-10-CM) - Cervical dystonia   M50.30 (ICD-10-CM) - DDD (degenerative disc disease), cervical   M79.18 (ICD-10-CM) - Myofascial pain syndrome, cervical      Plan of Care Expiration: 1/12/2023  Progress Note Due: 12/13/2023  Date of Surgery: na  Visit # / Visits authorized: 3/ 20   FOTO: 1/ 3     Time In: 1:00 PM  Time Out: 2:00  Total Billable Time: 60 minutes  Precautions: Standard       SUBJECTIVE     Pt reports: that she felt better after last tx. She reports that she tends to carry stress in her shlds. Post tx she states "I feel great".  She was compliant with home exercise program.  Response to previous treatment: no adverse effects  Functional change: none to date    Pain: 4/10  Location: bilateral neck      OBJECTIVE     Objective Measures updated at progress report unless specified.     Treatment     Edith received the treatments listed below:      therapeutic aquatic exercises to develop strength, endurance, ROM, flexibility, posture, and core stabilization for 60 minutes including:  AMB 3 min each with arm swing  FWD 1.3 mph  Side 0.7 mph     Stretches 3 x 30 sec  UT  Lev Scap  Rhomboid    UE exs 20x each  Flex/Ext  Shld Horiz ABD/ADD  Shld ABD/ADD  Protraction on rail   Horiz Row yellow " t-cord  B shld ext yellow t-cord  Shld depression small DB  TA ball push down 3 sec hold                  Patient Education and Home Exercises     Home Exercises Provided and Patient Education Provided     Education provided:   - effects of therapy    Written Home Exercises Provided: Patient instructed to cont prior HEP. Exercises were reviewed and Edith was able to demonstrate them prior to the end of the session.  Edith demonstrated good  understanding of the education provided. See EMR under Patient Instructions for exercises provided during therapy sessions    ASSESSMENT     Edith bernardo today's tx with aquatic ther ex, well with benefit post tx as above She was able to perform all activities today w/o difficultly or complaint. She required few VCs for proper form, posture and ROM with exs post instruction.    Edith Is progressing well towards her goals.   Pt prognosis is Good.     Pt will continue to benefit from skilled outpatient physical therapy to address the deficits listed in the problem list box on initial evaluation, provide pt/family education and to maximize pt's level of independence in the home and community environment.     Pt's spiritual, cultural and educational needs considered and pt agreeable to plan of care and goals.     Anticipated barriers to physical therapy: chronic, age, prior therapy    Goals: Short Term Goals: 4 weeks   -Improve right lat flexion by 10 deg for improving mobility.  -Improve left lat flexion by 10 deg for improving mobility.  -Neck strength improved to grossly 4/5 for improved postural control.     Long Term Goals: 8 weeks   Bilateral UE strength improved to grossly 4/5 for improving postural support.  Pt (I) in comprehensive AQ HEP for d/c.    PLAN     Plan of care Certification: 11/14/2023 to 1/12/2023.     Outpatient Physical Therapy 2 times weekly for 8 weeks to include the following interventions: Aquatic Therapy, Electrical Stimulation IFC, Manual  Therapy, Moist Heat/ Ice, Neuromuscular Re-ed, Patient Education, Self Care, Therapeutic Activities, Therapeutic Exercise, and Ultrasound.    John Costello, PTA

## 2023-12-05 ENCOUNTER — CLINICAL SUPPORT (OUTPATIENT)
Dept: REHABILITATION | Facility: HOSPITAL | Age: 68
End: 2023-12-05
Payer: MEDICARE

## 2023-12-05 DIAGNOSIS — R29.898 DECREASED RANGE OF MOTION OF NECK: Primary | ICD-10-CM

## 2023-12-05 DIAGNOSIS — R29.898 IMPAIRED STRENGTH OF UPPER EXTREMITY: ICD-10-CM

## 2023-12-05 PROCEDURE — 97140 MANUAL THERAPY 1/> REGIONS: CPT | Mod: PO,CQ

## 2023-12-05 PROCEDURE — 97110 THERAPEUTIC EXERCISES: CPT | Mod: PO,CQ

## 2023-12-05 NOTE — PROGRESS NOTES
"[facility]   Physical Therapy Treatment Note     Name: Edith Jackson  Clinic Number: [id number]    Therapy Diagnosis:   Encounter Diagnoses   Name Primary?    Decreased range of motion of neck Yes    Impaired strength of upper extremity      Physician: Miracle Medina NP    Visit Date: 12/5/2023  Physician Orders: PT Eval and Treat   Question Answer   Post Surgical? No   Eval and Treat Yes   Type of Therapy Aquatic Therapy   Location: Neck      Medical Diagnosis from Referral: Cervical dystonia [G24.3]   DDD (degenerative disc disease), cervical [M50.30]   Myofascial pain syndrome, cervical [M79.18]   Evaluation Date: 11/14/2023  Authorization Period Expiration:   G24.3 (ICD-10-CM) - Cervical dystonia   M50.30 (ICD-10-CM) - DDD (degenerative disc disease), cervical   M79.18 (ICD-10-CM) - Myofascial pain syndrome, cervical      Plan of Care Expiration: 1/12/2023  Progress Note Due: 12/13/2023  Date of Surgery: na  Visit # / Visits authorized: 4/ 20   FOTO: 1/ 3     Time In: 8:00 AM  Time Out: 9:00 AM  Total Billable Time: 60 minutes  Precautions: Standard       SUBJECTIVE     Pt reports: that she felt better after last tx. She reports that she tends to carry stress in her shlds. Post tx she states "I feel great".  She was compliant with home exercise program.  Response to previous treatment: no adverse effects  Functional change: none to date    Pain: 2/10 pre-tx, 0/10post  Location: bilateral neck      OBJECTIVE     Objective Measures updated at progress report unless specified.     Treatment     Edith received the treatments listed below:      therapeutic  exercises to develop strength, endurance, ROM, flexibility, posture, and core stabilization for 45 minutes including:    Stretches 3 x 30 sec  UT  Lev Scap  Rhomboid    UE exs 20x each  Supine flex with dowel   Supine D2  Supine Horiz ABD/ADD  SL ER  Supine Protraction    Horiz Row yellow t-cord  Supine chin tuck  Supine " ISO cervical retraction    Edith received manual intervention x 15 min to include  STM to B UT, Lev Scap  Manual intermittent distraction  Sub-occipital release                    Patient Education and Home Exercises     Home Exercises Provided and Patient Education Provided     Education provided:   - effects of therapy    Written Home Exercises Provided: Patient instructed to cont prior HEP. Exercises were reviewed and Edith was able to demonstrate them prior to the end of the session.  Edith demonstrated good  understanding of the education provided. See EMR under Patient Instructions for exercises provided during therapy sessions    ASSESSMENT     Edith bernardo today's tx with ther ex and manual intervention well with benefit of decreased sx post tx as above She was able to perform all activities today w/o difficultly or complaint. She required few VCs for proper form, posture and ROM with exs post instruction.    Edith Is progressing well towards her goals.   Pt prognosis is Good.     Pt will continue to benefit from skilled outpatient physical therapy to address the deficits listed in the problem list box on initial evaluation, provide pt/family education and to maximize pt's level of independence in the home and community environment.     Pt's spiritual, cultural and educational needs considered and pt agreeable to plan of care and goals.     Anticipated barriers to physical therapy: chronic, age, prior therapy    Goals: Short Term Goals: 4 weeks   -Improve right lat flexion by 10 deg for improving mobility.  -Improve left lat flexion by 10 deg for improving mobility.  -Neck strength improved to grossly 4/5 for improved postural control.     Long Term Goals: 8 weeks   Bilateral UE strength improved to grossly 4/5 for improving postural support.  Pt (I) in comprehensive AQ HEP for d/c.    PLAN     Plan of care Certification: 11/14/2023 to 1/12/2023.     Outpatient Physical Therapy 2 times weekly for  8 weeks to include the following interventions: Aquatic Therapy, Electrical Stimulation IFC, Manual Therapy, Moist Heat/ Ice, Neuromuscular Re-ed, Patient Education, Self Care, Therapeutic Activities, Therapeutic Exercise, and Ultrasound.    John Costello, PTA

## 2023-12-13 ENCOUNTER — CLINICAL SUPPORT (OUTPATIENT)
Dept: REHABILITATION | Facility: HOSPITAL | Age: 68
End: 2023-12-13
Payer: MEDICARE

## 2023-12-13 DIAGNOSIS — R29.898 IMPAIRED STRENGTH OF UPPER EXTREMITY: ICD-10-CM

## 2023-12-13 DIAGNOSIS — R29.898 DECREASED RANGE OF MOTION OF NECK: Primary | ICD-10-CM

## 2023-12-13 PROCEDURE — 97113 AQUATIC THERAPY/EXERCISES: CPT | Mod: PO,CQ

## 2023-12-13 NOTE — PROGRESS NOTES
[facility]   Physical Therapy AquaticTreatment Note     Name: Edith Jackson  Clinic Number: [id number]    Therapy Diagnosis:   Encounter Diagnoses   Name Primary?    Decreased range of motion of neck Yes    Impaired strength of upper extremity      Physician: Miracle Medina NP    Visit Date: 12/13/2023  Physician Orders: PT Eval and Treat   Question Answer   Post Surgical? No   Eval and Treat Yes   Type of Therapy Aquatic Therapy   Location: Neck      Medical Diagnosis from Referral: Cervical dystonia [G24.3]   DDD (degenerative disc disease), cervical [M50.30]   Myofascial pain syndrome, cervical [M79.18]   Evaluation Date: 11/14/2023  Authorization Period Expiration:   G24.3 (ICD-10-CM) - Cervical dystonia   M50.30 (ICD-10-CM) - DDD (degenerative disc disease), cervical   M79.18 (ICD-10-CM) - Myofascial pain syndrome, cervical      Plan of Care Expiration: 1/12/2023  Progress Note Due: 12/13/2023  Date of Surgery: na  Visit # / Visits authorized: 5/ 20   FOTO: 1/ 3     Time In: 10:00 AM  Time Out: 10:55 AM  Total Billable Time: 55 minutes  Precautions: Standard       SUBJECTIVE     Pt reports: that her neck is feeling better. She reports that she is still trying to get over allergy sx  She was compliant with home exercise program.  Response to previous treatment: no adverse effects  Functional change: none to date    Pain: 1/10  Location: bilateral neck      OBJECTIVE     Objective Measures updated at progress report unless specified.     Treatment     Edith received the treatments listed below:      therapeutic aquatic exercises to develop strength, endurance, ROM, flexibility, posture, and core stabilization for 60 minutes including:  AMB 3 min each with arm swing  FWD 1.3 mph  Side 0.7 mph     Stretches 3 x 30 sec  UT  Lev Scap  Rhomboid  Corner stretch    UE exs 20x each  Flex/Ext APO  Shld Horiz ABD/ADD APO  Shld ABD/ADD APO  Protraction on rail   Horiz Row orange  t-cord  B shld ext orange t-cord  Shld depression small DB  TA ball push down 3 sec hold                  Patient Education and Home Exercises     Home Exercises Provided and Patient Education Provided     Education provided:   - effects of therapy    Written Home Exercises Provided: Patient instructed to cont prior HEP. Exercises were reviewed and Edith was able to demonstrate them prior to the end of the session.  Edith demonstrated good  understanding of the education provided. See EMR under Patient Instructions for exercises provided during therapy sessions    ASSESSMENT     Edith bernardo today's tx with progression of aquatic ther ex, well.She was able to progress with resistance with most activities today w/o difficultly or complaint. She required some VCs for proper form, posture and ROM with exs post instruction due to increase in resistance.    Edith Is progressing well towards her goals.   Pt prognosis is Good.     Pt will continue to benefit from skilled outpatient physical therapy to address the deficits listed in the problem list box on initial evaluation, provide pt/family education and to maximize pt's level of independence in the home and community environment.     Pt's spiritual, cultural and educational needs considered and pt agreeable to plan of care and goals.     Anticipated barriers to physical therapy: chronic, age, prior therapy    Goals: Short Term Goals: 4 weeks   -Improve right lat flexion by 10 deg for improving mobility.  -Improve left lat flexion by 10 deg for improving mobility.  -Neck strength improved to grossly 4/5 for improved postural control.     Long Term Goals: 8 weeks   Bilateral UE strength improved to grossly 4/5 for improving postural support.  Pt (I) in comprehensive AQ HEP for d/c.    PLAN     Plan of care Certification: 11/14/2023 to 1/12/2023.     Outpatient Physical Therapy 2 times weekly for 8 weeks to include the following interventions: Aquatic Therapy,  Electrical Stimulation IFC, Manual Therapy, Moist Heat/ Ice, Neuromuscular Re-ed, Patient Education, Self Care, Therapeutic Activities, Therapeutic Exercise, and Ultrasound.    John Costello, PTA

## 2023-12-15 ENCOUNTER — CLINICAL SUPPORT (OUTPATIENT)
Dept: REHABILITATION | Facility: HOSPITAL | Age: 68
End: 2023-12-15
Payer: MEDICARE

## 2023-12-15 DIAGNOSIS — R29.898 IMPAIRED STRENGTH OF UPPER EXTREMITY: ICD-10-CM

## 2023-12-15 DIAGNOSIS — R29.898 DECREASED RANGE OF MOTION OF NECK: Primary | ICD-10-CM

## 2023-12-15 PROCEDURE — 97113 AQUATIC THERAPY/EXERCISES: CPT | Mod: PO,CQ

## 2023-12-15 NOTE — PROGRESS NOTES
[facility]   Physical Therapy AquaticTreatment Note     Name: Edith Rodriguez Jackson  Clinic Number: [id number]    Therapy Diagnosis:   Encounter Diagnoses   Name Primary?    Decreased range of motion of neck Yes    Impaired strength of upper extremity      Physician: Miracle Medina NP    Visit Date: 12/15/2023  Physician Orders: PT Eval and Treat   Question Answer   Post Surgical? No   Eval and Treat Yes   Type of Therapy Aquatic Therapy   Location: Neck      Medical Diagnosis from Referral: Cervical dystonia [G24.3]   DDD (degenerative disc disease), cervical [M50.30]   Myofascial pain syndrome, cervical [M79.18]   Evaluation Date: 11/14/2023  Authorization Period Expiration:   G24.3 (ICD-10-CM) - Cervical dystonia   M50.30 (ICD-10-CM) - DDD (degenerative disc disease), cervical   M79.18 (ICD-10-CM) - Myofascial pain syndrome, cervical      Plan of Care Expiration: 1/12/2023  Progress Note Due: 12/13/2023  Date of Surgery: na  Visit # / Visits authorized: 5/ 20   FOTO: 1/ 3     Time In: 11:00 AM  Time Out: 11:54  Total Billable Time: 54 minutes  Precautions: Standard       SUBJECTIVE     Pt reports: that driving increases her pn due to having to turn her head a lot.   She was compliant with home exercise program.  Response to previous treatment: no adverse effects  Functional change: none to date    Pain: 2/10  Location: bilateral neck      OBJECTIVE     Objective Measures updated at progress report unless specified.     Treatment     Edith received the treatments listed below:      therapeutic aquatic exercises to develop strength, endurance, ROM, flexibility, posture, and core stabilization for 60 minutes including:  AMB 3 min each with arm swing  FWD 1.3 mph  Side 0.7 mph     Stretches 3 x 30 sec  UT  Lev Scap  Rhomboid  Corner stretch    UE exs 20x each  Flex/Ext APO  Shld Horiz ABD/ADD APO  Shld ABD/ADD APO  Protraction on rail   Horiz Row orange t-cord  B shld ext orange  t-cord  Shld depression small DB  TA ball push down 3 sec hold          Patient Education and Home Exercises     Home Exercises Provided and Patient Education Provided     Education provided:   - effects of therapy    Written Home Exercises Provided: Patient instructed to cont prior HEP. Exercises were reviewed and Edith was able to demonstrate them prior to the end of the session.  Edith demonstrated good  understanding of the education provided. See EMR under Patient Instructions for exercises provided during therapy sessions    ASSESSMENT     Edith bernardo today's tx with  aquatic ther ex, well.She was able to progress with resistance with most activities today w/o difficultly. She did report some L side neck pn with R UT and Lev Scap stretch that was reduced with decreased cervical ROM and compensating with increased R UE distraction. She required some VCs for proper form, posture and ROM with exs post instruction due to increase in resistance.    Edith Is progressing well towards her goals.   Pt prognosis is Good.     Pt will continue to benefit from skilled outpatient physical therapy to address the deficits listed in the problem list box on initial evaluation, provide pt/family education and to maximize pt's level of independence in the home and community environment.     Pt's spiritual, cultural and educational needs considered and pt agreeable to plan of care and goals.     Anticipated barriers to physical therapy: chronic, age, prior therapy    Goals: Short Term Goals: 4 weeks   -Improve right lat flexion by 10 deg for improving mobility.  -Improve left lat flexion by 10 deg for improving mobility.  -Neck strength improved to grossly 4/5 for improved postural control.     Long Term Goals: 8 weeks   Bilateral UE strength improved to grossly 4/5 for improving postural support.  Pt (I) in comprehensive AQ HEP for d/c.    PLAN     Plan of care Certification: 11/14/2023 to 1/12/2023.     Outpatient  Physical Therapy 2 times weekly for 8 weeks to include the following interventions: Aquatic Therapy, Electrical Stimulation IFC, Manual Therapy, Moist Heat/ Ice, Neuromuscular Re-ed, Patient Education, Self Care, Therapeutic Activities, Therapeutic Exercise, and Ultrasound.    John Costello, PTA

## 2023-12-19 ENCOUNTER — CLINICAL SUPPORT (OUTPATIENT)
Dept: REHABILITATION | Facility: HOSPITAL | Age: 68
End: 2023-12-19
Payer: MEDICARE

## 2023-12-19 DIAGNOSIS — R29.898 IMPAIRED STRENGTH OF UPPER EXTREMITY: ICD-10-CM

## 2023-12-19 DIAGNOSIS — R29.898 DECREASED RANGE OF MOTION OF NECK: Primary | ICD-10-CM

## 2023-12-19 PROCEDURE — 97110 THERAPEUTIC EXERCISES: CPT | Mod: PO | Performed by: PHYSICAL THERAPIST
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
## 2023-12-21 ENCOUNTER — CLINICAL SUPPORT (OUTPATIENT)
Dept: REHABILITATION | Facility: HOSPITAL | Age: 68
End: 2023-12-21
Payer: MEDICARE

## 2023-12-21 DIAGNOSIS — R29.898 IMPAIRED STRENGTH OF UPPER EXTREMITY: ICD-10-CM

## 2023-12-21 DIAGNOSIS — R29.898 DECREASED RANGE OF MOTION OF NECK: Primary | ICD-10-CM

## 2023-12-21 PROCEDURE — 97113 AQUATIC THERAPY/EXERCISES: CPT | Mod: PO,CQ

## 2023-12-21 NOTE — PROGRESS NOTES
[facility]   Physical Therapy AquaticTreatment Note     Name: Edith Rodriguez Jackson  Clinic Number: [id number]    Therapy Diagnosis:   Encounter Diagnoses   Name Primary?    Decreased range of motion of neck Yes    Impaired strength of upper extremity      Physician: Miracle Medina NP    Visit Date: 12/21/2023  Physician Orders: PT Eval and Treat   Question Answer   Post Surgical? No   Eval and Treat Yes   Type of Therapy Aquatic Therapy   Location: Neck      Medical Diagnosis from Referral: Cervical dystonia [G24.3]   DDD (degenerative disc disease), cervical [M50.30]   Myofascial pain syndrome, cervical [M79.18]   Evaluation Date: 11/14/2023  Authorization Period Expiration:   G24.3 (ICD-10-CM) - Cervical dystonia   M50.30 (ICD-10-CM) - DDD (degenerative disc disease), cervical   M79.18 (ICD-10-CM) - Myofascial pain syndrome, cervical      Plan of Care Expiration: 1/12/2023  Progress Note Due: 12/13/2023  Date of Surgery: na  Visit # / Visits authorized: 7/ 20   FOTO: 1/ 3     Time In: 11:00 AM  Time Out: 12:00  Total Billable Time: 60 minutes  Precautions: Standard       SUBJECTIVE     Pt reports: that she feels that aquatic therapy is helping and she is feeling better with reduced sx for a day post tx. .   She was compliant with home exercise program.  Response to previous treatment: no adverse effects  Functional change: none to date    Pain: 1/10  Location: bilateral neck      OBJECTIVE     Objective Measures updated at progress report unless specified.     Treatment     Edith received the treatments listed below:      therapeutic aquatic exercises to develop strength, endurance, ROM, flexibility, posture, and core stabilization for 60 minutes including:  AMB 3 min each with arm swing  FWD 1.3 mph  Side 0.7 mph     Stretches 3 x 30 sec  UT  Lev Scap  Rhomboid  Corner stretch    UE exs 20x each  Flex/Ext APO  Shld Horiz ABD/ADD APO  Shld ABD/ADD APO  Protraction on rail   Horiz  Row orange t-cord  B shld ext orange t-cord  Lat pull orange t-core  Shld depression small DB  TA ball push down 3 sec hold  Tricep dip on rail   Behind the back row with dowel          Patient Education and Home Exercises     Home Exercises Provided and Patient Education Provided     Education provided:   - effects of therapy  -pt issued red t-band for use with HEP issued last session    Written Home Exercises Provided: Patient instructed to cont prior HEP. Exercises were reviewed and Edith was able to demonstrate them prior to the end of the session.  Edith demonstrated good  understanding of the education provided. See EMR under Patient Instructions for exercises provided during therapy sessions    ASSESSMENT     Edith bernardo today's tx with progression of aquatic ther ex, well.She was able to perform all activities and add lat pull, behind the back row and tricep dip today w/o difficultly. She continues to report some L side neck pn with R UT and Lev Scap stretch that was reduced with decreased cervical ROM and compensating with increased R UE distraction. She required some VCs for proper form, posture and ROM with exs post instruction due to increase in resistance.    Edith Is progressing well towards her goals.   Pt prognosis is Good.     Pt will continue to benefit from skilled outpatient physical therapy to address the deficits listed in the problem list box on initial evaluation, provide pt/family education and to maximize pt's level of independence in the home and community environment.     Pt's spiritual, cultural and educational needs considered and pt agreeable to plan of care and goals.     Anticipated barriers to physical therapy: chronic, age, prior therapy    Goals: Short Term Goals: 4 weeks   -Improve right lat flexion by 10 deg for improving mobility.  -Improve left lat flexion by 10 deg for improving mobility.  -Neck strength improved to grossly 4/5 for improved postural control.      Long Term Goals: 8 weeks   Bilateral UE strength improved to grossly 4/5 for improving postural support.  Pt (I) in comprehensive AQ HEP for d/c.    PLAN     Plan of care Certification: 11/14/2023 to 1/12/2023.     Outpatient Physical Therapy 2 times weekly for 8 weeks to include the following interventions: Aquatic Therapy, Electrical Stimulation IFC, Manual Therapy, Moist Heat/ Ice, Neuromuscular Re-ed, Patient Education, Self Care, Therapeutic Activities, Therapeutic Exercise, and Ultrasound.    John Costello, PTA

## 2023-12-27 ENCOUNTER — CLINICAL SUPPORT (OUTPATIENT)
Dept: REHABILITATION | Facility: HOSPITAL | Age: 68
End: 2023-12-27
Payer: MEDICARE

## 2023-12-27 ENCOUNTER — DOCUMENTATION ONLY (OUTPATIENT)
Dept: REHABILITATION | Facility: HOSPITAL | Age: 68
End: 2023-12-27

## 2023-12-27 DIAGNOSIS — R29.898 DECREASED RANGE OF MOTION OF NECK: Primary | ICD-10-CM

## 2023-12-27 DIAGNOSIS — R29.898 IMPAIRED STRENGTH OF UPPER EXTREMITY: ICD-10-CM

## 2023-12-27 PROCEDURE — 97112 NEUROMUSCULAR REEDUCATION: CPT | Mod: PO | Performed by: PHYSICAL THERAPIST

## 2023-12-27 PROCEDURE — 97110 THERAPEUTIC EXERCISES: CPT | Mod: PO | Performed by: PHYSICAL THERAPIST

## 2023-12-27 NOTE — PROGRESS NOTES
[facility]    Physical Therapy Treatment Note     Name: Edith Jackson  Clinic Number: [id number]    Therapy Diagnosis:   Encounter Diagnoses   Name Primary?    Decreased range of motion of neck Yes    Impaired strength of upper extremity      Physician: Miracle Medina NP    Visit Date: 12/27/2023  Physician Orders: PT Eval and Treat   Question Answer   Post Surgical? No   Eval and Treat Yes   Type of Therapy Aquatic Therapy   Location: Neck      Medical Diagnosis from Referral: Cervical dystonia [G24.3]   DDD (degenerative disc disease), cervical [M50.30]   Myofascial pain syndrome, cervical [M79.18]   Evaluation Date: 11/14/2023  Authorization Period Expiration:   G24.3 (ICD-10-CM) - Cervical dystonia   M50.30 (ICD-10-CM) - DDD (degenerative disc disease), cervical   M79.18 (ICD-10-CM) - Myofascial pain syndrome, cervical      Plan of Care Expiration: 1/12/2023  Progress Note Due: 12/13/2023, 1/18/2024  Date of Surgery: na  Visit # / Visits authorized: 1/1, 7/ 20   FOTO: 2/ 3     Time In: 1000  Time Out: 1100  Total Billable Time: 30 minutes (1:1)  Precautions: Standard     SUBJECTIVE     Pt reports: that her friend massaged a tight spot which helped and she has minimal right sided cervical paraspinal pain.  She was compliant with home exercise program.  Response to previous treatment: no adverse effects  Functional change: improving cervical spine ROM and strength with dec pn.    Pain: 1/10  Location: bilateral neck      OBJECTIVE     Objective Measures updated at progress report unless specified.     12/19/2023:  Observation: impaired FHP, rounded shoulders mildly, hypertrophy of right>Left scalenes and SCMs. (Improving)     Posture: some awareness of posture     Cervical Range of Motion:     Degrees Pain   Flexion 50 -      Extension 60 -      Right Rotation 50 -      Left Rotation 60 -      Right Side Bending 40 -, tight   Left Side Bending 40 -, tight      Shoulder Range  "of Motion:   Shoulder Left Right   Flexion wnl wnl   Abduction wnl wnl   ER wnl wnl   IR wnl wnl      Strength:  Cervical MMT   Flexion 4+/5   Extension 4+/5   Right Side Bend 4+/5   Left Side Bend 4+/5      Upper Extremity Strength  (R) UE   (L) UE     Shoulder flexion: 4-/5 Shoulder flexion: 4-/5   Shoulder Abduction: 4/5 Shoulder abduction: 4/5   Shoulder ER 4/5 Shoulder ER 4/5   Shoulder IR 4/5 Shoulder IR 4/5   Elbow flexion: 4/5 Elbow flexion: 4/5   Elbow extension: 4/5 Elbow extension: 4/5   Lower Trap 3+/5 Lower Trap 3+/5   Middle Trap 4-/5 Middle Trap 4-/5   Rhomboids 4+/5 Rhomboids 4+/5      Palpation: - right SCM, + Levator Scapulae, - paraspinals, + UT     Sensation: intact bilaterally     Intake Outcome Measure for FOTO NECK Survey     Therapist reviewed FOTO scores for Edith Jackson on 11/14/2023.   FOTO report - see Media section or FOTO account episode details.     Intake Score: 53  Goal: 61  12/1/2023: 75 (+22)         Treatment     Edith received the treatments listed below:      therapeutic exercises to develop strength, endurance, ROM, flexibility, posture, and core stabilization for 20 minutes including:  Corner stretch 3 x 30"  Standing Ts 2 x 10 gtb  Standing wall angels 2 x 10  Shoulder extension blue band 3 x 10  Lat pulls blue band 3 x 10  Shoulder INTERNAL ROTATION green band 3 x 10    Neuromuscular ex: x 20 mins  OH pulleys with head turns 3 mins  Supine Xs ytb 2 x 10  Supine Ts ytb 2 x 10  Side lying open book with head tracking 10 x 5"    Not performed:  Standing against wall 2# shoulder Abduction, Y, I x 10 each  Chin tuck 2 x 10  Row machine 2 x 10 40#  Chest press 30# 2 x 10  Wall flexion with lift x 15, red band  Push up plus 3 x 5  No money gtb 2 x 10  EXTERNAL ROTATION red band 2 x 10  Ball on wall 4 way x 10 each red ball  Wall clock red band 2 x 10    Patient Education and Home Exercises     Home Exercises Provided and Patient Education Provided     Education " provided:   - effects of therapy    Written Home Exercises Provided: Patient instructed to cont prior HEP. Exercises were reviewed and Edith was able to demonstrate them prior to the end of the session.  Edith demonstrated good  understanding of the education provided. See EMR under Patient Instructions for exercises provided during therapy sessions    ASSESSMENT     Edith tolerated land session well and demos want to progress home program. She will benefit from serratus ex as she has bilateral Scapular winging and UPPER TRAP compensation. Continue shoulder stabilization and pain management.    Pt was offered 2 PM opening for pool that came available tomorrow, but she is going out of town and unavailable.    Edith Is progressing well towards her goals.   Pt prognosis is Good.     Pt will continue to benefit from skilled outpatient physical therapy to address the deficits listed in the problem list box on initial evaluation, provide pt/family education and to maximize pt's level of independence in the home and community environment.     Pt's spiritual, cultural and educational needs considered and pt agreeable to plan of care and goals.     Anticipated barriers to physical therapy: chronic, age, prior therapy    Goals: Short Term Goals: 4 weeks   -Improve right lat flexion by 10 deg for improving mobility.  -MET  -Improve left lat flexion by 10 deg for improving mobility. -MET  -Neck strength improved to grossly 4/5 for improved postural control.  -MET     Long Term Goals: 8 weeks   Bilateral UE strength improved to grossly 4/5 for improving postural support. -ONGOING  Pt (I) in comprehensive AQ HEP for d/c. -ONGOING    PLAN     Plan of care Certification: 11/14/2023 to 1/12/2023.     Outpatient Physical Therapy 2 times weekly for 8 weeks to include the following interventions: Aquatic Therapy, Electrical Stimulation IFC, Manual Therapy, Moist Heat/ Ice, Neuromuscular Re-ed, Patient Education, Self Care,  Therapeutic Activities, Therapeutic Exercise, and Ultrasound.    Marilu Giraldo, PT

## 2024-01-02 ENCOUNTER — CLINICAL SUPPORT (OUTPATIENT)
Dept: REHABILITATION | Facility: HOSPITAL | Age: 69
End: 2024-01-02
Payer: MEDICARE

## 2024-01-02 DIAGNOSIS — R29.898 IMPAIRED STRENGTH OF UPPER EXTREMITY: ICD-10-CM

## 2024-01-02 DIAGNOSIS — R29.898 DECREASED RANGE OF MOTION OF NECK: Primary | ICD-10-CM

## 2024-01-02 PROCEDURE — 97113 AQUATIC THERAPY/EXERCISES: CPT | Mod: PO,CQ

## 2024-01-02 NOTE — PROGRESS NOTES
[facility]   Physical Therapy AquaticTreatment Note     Name: Edith Jackson  Clinic Number: [id number]    Therapy Diagnosis:   Encounter Diagnoses   Name Primary?    Decreased range of motion of neck Yes    Impaired strength of upper extremity      Physician: Miracle Medina NP    Visit Date: 1/2/2024  Physician Orders: PT Eval and Treat   Question Answer   Post Surgical? No   Eval and Treat Yes   Type of Therapy Aquatic Therapy   Location: Neck      Medical Diagnosis from Referral: Cervical dystonia [G24.3]   DDD (degenerative disc disease), cervical [M50.30]   Myofascial pain syndrome, cervical [M79.18]   Evaluation Date: 11/14/2023  Authorization Period Expiration:   G24.3 (ICD-10-CM) - Cervical dystonia   M50.30 (ICD-10-CM) - DDD (degenerative disc disease), cervical   M79.18 (ICD-10-CM) - Myofascial pain syndrome, cervical      Plan of Care Expiration: 1/12/2023  Progress Note Due: 12/13/2023  Date of Surgery: na  Visit # / Visits authorized: 9/ 20   FOTO: 1/ 3     Time In: 1:00 PM  Time Out: 2:00  Total Billable Time: 60 minutes  Precautions: Standard       SUBJECTIVE     Pt reports: that she has not done much the last few days and is not having much pn  She reports that she gets L thoracic pn with amb.    She was compliant with home exercise program.  Response to previous treatment: no adverse effects  Functional change: none to date    Pain: 1/10  Location: bilateral neck      OBJECTIVE     Objective Measures updated at progress report unless specified.     Treatment     Edith received the treatments listed below:      therapeutic aquatic exercises to develop strength, endurance, ROM, flexibility, posture, and core stabilization for 60 minutes including:  AMB 3 min each with arm swing  FWD 1.3 mph  Side 0.7 mph     Stretches 3 x 30 sec  UT  Lev Scap  Rhomboid  Corner stretch    UE exs 20x each  Flex/Ext AP#3  Shld Horiz ABD/ADD AP#3  Shld ABD/ADD AP#3  Protraction on  rail   Horiz Row orange t-cord  B shld ext orange t-cord  Lat pull orange t-cord  Shld depression small DB  TA ball push down 3 sec hold  Tricep dip on rail   Behind the back row with dowel          Patient Education and Home Exercises     Home Exercises Provided and Patient Education Provided     Education provided:   - effects of therapy  -pt issued red t-band for use with HEP issued last session    Written Home Exercises Provided: Patient instructed to cont prior HEP. Exercises were reviewed and Edith was able to demonstrate them prior to the end of the session.  Edith demonstrated good  understanding of the education provided. See EMR under Patient Instructions for exercises provided during therapy sessions    ASSESSMENT     Edith bernardo today's tx with progression of aquatic ther ex, well.She was able to progress with resistance with aqua paddle exs today w/o difficultly. . She required some VCs for proper form, posture and ROM with exs post instruction due to increase in resistance.    Edith Is progressing well towards her goals.   Pt prognosis is Good.     Pt will continue to benefit from skilled outpatient physical therapy to address the deficits listed in the problem list box on initial evaluation, provide pt/family education and to maximize pt's level of independence in the home and community environment.     Pt's spiritual, cultural and educational needs considered and pt agreeable to plan of care and goals.     Anticipated barriers to physical therapy: chronic, age, prior therapy    Goals: Short Term Goals: 4 weeks   -Improve right lat flexion by 10 deg for improving mobility.  -Improve left lat flexion by 10 deg for improving mobility.  -Neck strength improved to grossly 4/5 for improved postural control.     Long Term Goals: 8 weeks   Bilateral UE strength improved to grossly 4/5 for improving postural support.  Pt (I) in comprehensive AQ HEP for d/c.    PLAN     Plan of care Certification:  11/14/2023 to 1/12/2023.     Outpatient Physical Therapy 2 times weekly for 8 weeks to include the following interventions: Aquatic Therapy, Electrical Stimulation IFC, Manual Therapy, Moist Heat/ Ice, Neuromuscular Re-ed, Patient Education, Self Care, Therapeutic Activities, Therapeutic Exercise, and Ultrasound.    John Costello, PTA

## 2024-01-04 ENCOUNTER — CLINICAL SUPPORT (OUTPATIENT)
Dept: REHABILITATION | Facility: HOSPITAL | Age: 69
End: 2024-01-04
Payer: MEDICARE

## 2024-01-04 DIAGNOSIS — R29.898 IMPAIRED STRENGTH OF UPPER EXTREMITY: ICD-10-CM

## 2024-01-04 DIAGNOSIS — R29.898 DECREASED RANGE OF MOTION OF NECK: Primary | ICD-10-CM

## 2024-01-04 PROCEDURE — 97113 AQUATIC THERAPY/EXERCISES: CPT | Mod: PO,CQ

## 2024-01-04 NOTE — PROGRESS NOTES
[facility]   Physical Therapy AquaticTreatment Note     Name: Edith Rodriguez Zahl  Clinic Number: [id number]    Therapy Diagnosis:   Encounter Diagnoses   Name Primary?    Decreased range of motion of neck Yes    Impaired strength of upper extremity      Physician: Miracle Medina NP    Visit Date: 1/4/2024  Physician Orders: PT Eval and Treat   Question Answer   Post Surgical? No   Eval and Treat Yes   Type of Therapy Aquatic Therapy   Location: Neck      Medical Diagnosis from Referral: Cervical dystonia [G24.3]   DDD (degenerative disc disease), cervical [M50.30]   Myofascial pain syndrome, cervical [M79.18]   Evaluation Date: 11/14/2023  Authorization Period Expiration:   G24.3 (ICD-10-CM) - Cervical dystonia   M50.30 (ICD-10-CM) - DDD (degenerative disc disease), cervical   M79.18 (ICD-10-CM) - Myofascial pain syndrome, cervical      Plan of Care Expiration: 1/12/2023  Progress Note Due: 12/13/2023  Date of Surgery: na  Visit # / Visits authorized: 10/ 20   FOTO: 1/ 3     Time In: 11:00 AM  Time Out: 11:55 AM  Total Billable Time: 55 minutes  Precautions: Standard       SUBJECTIVE     Pt reports: that did well post last tx and is w/o c/o pn upon arrival today.    She was compliant with home exercise program.  Response to previous treatment: no adverse effects  Functional change: none to date    Pain: 0/10  Location: bilateral neck      OBJECTIVE     Objective Measures updated at progress report unless specified.     Treatment     Edith received the treatments listed below:      therapeutic aquatic exercises to develop strength, endurance, ROM, flexibility, posture, and core stabilization for 55 minutes including:  AMB 3 min each with arm swing  FWD 1.3 mph  Side 0.7 mph     Stretches 3 x 30 sec  UT  Lev Scap  Rhomboid  Corner stretch    UE exs 20x each  Flex/Ext AP#3  Shld Horiz ABD/ADD AP#3  Shld ABD/ADD AP#3  Protraction on rail   Horiz Row orange t-cord  B shld ext orange  t-cord  Lat pull orange t-cord  Shld depression small DB  TA ball push down 3 sec hold  Tricep dip on rail   Behind the back row with dowel 1#          Patient Education and Home Exercises     Home Exercises Provided and Patient Education Provided     Education provided:   - effects of therapy  -pt issued red t-band for use with HEP issued last session    Written Home Exercises Provided: Patient instructed to cont prior HEP. Exercises were reviewed and Edith was able to demonstrate them prior to the end of the session.  Edith demonstrated good  understanding of the education provided. See EMR under Patient Instructions for exercises provided during therapy sessions    ASSESSMENT     Edith bernardo today's tx with progression of aquatic ther ex, well.She was able to progress with resistance with dowel ex today w/o difficultly. . She required some VCs for proper form, posture and ROM with exs post instruction .  Edith Is progressing well towards her goals. Edith is making good progress towards goals reporting decrease in neck pn sx.   Pt prognosis is Good.     Pt will continue to benefit from skilled outpatient physical therapy to address the deficits listed in the problem list box on initial evaluation, provide pt/family education and to maximize pt's level of independence in the home and community environment.     Pt's spiritual, cultural and educational needs considered and pt agreeable to plan of care and goals.     Anticipated barriers to physical therapy: chronic, age, prior therapy    Goals: Short Term Goals: 4 weeks   -Improve right lat flexion by 10 deg for improving mobility.  -Improve left lat flexion by 10 deg for improving mobility.  -Neck strength improved to grossly 4/5 for improved postural control.     Long Term Goals: 8 weeks   Bilateral UE strength improved to grossly 4/5 for improving postural support.  Pt (I) in comprehensive AQ HEP for d/c.    PLAN     Plan of care Certification:  11/14/2023 to 1/12/2023.     Outpatient Physical Therapy 2 times weekly for 8 weeks to include the following interventions: Aquatic Therapy, Electrical Stimulation IFC, Manual Therapy, Moist Heat/ Ice, Neuromuscular Re-ed, Patient Education, Self Care, Therapeutic Activities, Therapeutic Exercise, and Ultrasound.    John Costello, PTA

## 2024-01-09 ENCOUNTER — CLINICAL SUPPORT (OUTPATIENT)
Dept: REHABILITATION | Facility: HOSPITAL | Age: 69
End: 2024-01-09
Payer: MEDICARE

## 2024-01-09 DIAGNOSIS — R29.898 DECREASED RANGE OF MOTION OF NECK: Primary | ICD-10-CM

## 2024-01-09 DIAGNOSIS — R29.898 IMPAIRED STRENGTH OF UPPER EXTREMITY: ICD-10-CM

## 2024-01-09 PROCEDURE — 97113 AQUATIC THERAPY/EXERCISES: CPT | Mod: PO,CQ

## 2024-01-09 NOTE — PROGRESS NOTES
[facility]   Physical Therapy AquaticTreatment Note     Name: Edith Rodriguez Jackson  Clinic Number: [id number]    Therapy Diagnosis:   Encounter Diagnoses   Name Primary?    Decreased range of motion of neck Yes    Impaired strength of upper extremity      Physician: Miracle Medina NP    Visit Date: 1/9/2024  Physician Orders: PT Eval and Treat   Question Answer   Post Surgical? No   Eval and Treat Yes   Type of Therapy Aquatic Therapy   Location: Neck      Medical Diagnosis from Referral: Cervical dystonia [G24.3]   DDD (degenerative disc disease), cervical [M50.30]   Myofascial pain syndrome, cervical [M79.18]   Evaluation Date: 11/14/2023  Authorization Period Expiration:   G24.3 (ICD-10-CM) - Cervical dystonia   M50.30 (ICD-10-CM) - DDD (degenerative disc disease), cervical   M79.18 (ICD-10-CM) - Myofascial pain syndrome, cervical      Plan of Care Expiration: 1/12/2023  Progress Note Due: 12/13/2023  Date of Surgery: na  Visit # / Visits authorized: 10/ 20   FOTO: 1/ 3     Time In: 12:53 PM  Time Out: 1:47 PM  Total Billable Time: 54 minutes  Precautions: Standard       SUBJECTIVE     Pt reports: that her neck feels great upon arrival today.  She reports that she is able to do more at home such as reaching into cabinets w/o pn and that her pn free ROM is improving  She was compliant with home exercise program.  Response to previous treatment: no adverse effects  Functional change: none to date    Pain: 0/10  Location: bilateral neck      OBJECTIVE     Objective Measures updated at progress report unless specified.     Treatment     Edith received the treatments listed below:      therapeutic aquatic exercises to develop strength, endurance, ROM, flexibility, posture, and core stabilization for 54 minutes including:  AMB 3 min each with arm swing  FWD 1.3 mph  Side 0.7 mph     Stretches 3 x 30 sec  UT  Lev Scap  Rhomboid  Corner stretch    UE exs 20x each  Flex/Ext APC  Shld  Horiz ABD/ADD APC  Shld ABD/ADD APC  Protraction on rail   Horiz Row orange t-cord  B shld ext orange t-cord  Lat pull orange t-cord  Shld depression small DB  TA ball push down 3 sec hold  Tricep dip on rail   Behind the back row with dowel 1#          Patient Education and Home Exercises     Home Exercises Provided and Patient Education Provided     Education provided:   - effects of therapy  -pt issued red t-band for use with HEP issued last session    Written Home Exercises Provided: Patient instructed to cont prior HEP. Exercises were reviewed and Edith was able to demonstrate them prior to the end of the session.  Edith demonstrated good  understanding of the education provided. See EMR under Patient Instructions for exercises provided during therapy sessions    ASSESSMENT     Edith bernardo today's tx with progression of aquatic ther ex, well.She was able to progress with resistance with aqua paddle exs today w/o difficultly. . She required some VCs for proper form, posture and ROM with exs post instruction .  Edith Is progressing well towards her goals. Edith is making good progress towards goals reporting decrease in neck pn sx in improved function with ADLS.   Pt prognosis is Good.     Pt will continue to benefit from skilled outpatient physical therapy to address the deficits listed in the problem list box on initial evaluation, provide pt/family education and to maximize pt's level of independence in the home and community environment.     Pt's spiritual, cultural and educational needs considered and pt agreeable to plan of care and goals.     Anticipated barriers to physical therapy: chronic, age, prior therapy    Goals: Short Term Goals: 4 weeks   -Improve right lat flexion by 10 deg for improving mobility.  -Improve left lat flexion by 10 deg for improving mobility.  -Neck strength improved to grossly 4/5 for improved postural control.     Long Term Goals: 8 weeks   Bilateral UE strength  improved to grossly 4/5 for improving postural support.  Pt (I) in comprehensive AQ HEP for d/c.    PLAN     Plan of care Certification: 11/14/2023 to 1/12/2023.     Outpatient Physical Therapy 2 times weekly for 8 weeks to include the following interventions: Aquatic Therapy, Electrical Stimulation IFC, Manual Therapy, Moist Heat/ Ice, Neuromuscular Re-ed, Patient Education, Self Care, Therapeutic Activities, Therapeutic Exercise, and Ultrasound.    John Costello, PTA

## 2024-01-11 ENCOUNTER — CLINICAL SUPPORT (OUTPATIENT)
Dept: REHABILITATION | Facility: HOSPITAL | Age: 69
End: 2024-01-11
Payer: MEDICARE

## 2024-01-11 DIAGNOSIS — R29.898 DECREASED RANGE OF MOTION OF NECK: Primary | ICD-10-CM

## 2024-01-11 DIAGNOSIS — R29.898 IMPAIRED STRENGTH OF UPPER EXTREMITY: ICD-10-CM

## 2024-01-11 PROCEDURE — 97110 THERAPEUTIC EXERCISES: CPT | Mod: PO | Performed by: PHYSICAL THERAPIST

## 2024-01-11 NOTE — PROGRESS NOTES
[facility]  PT Discharge Note    Name: Edith Rodriguez Lamont  Clinic Number: [id number]    Therapy Diagnosis:   Encounter Diagnoses   Name Primary?    Decreased range of motion of neck Yes    Impaired strength of upper extremity      Physician: Miracle Medina, LIAT    Physician Orders: PT Eval and Treat   Question Answer   Post Surgical? No   Eval and Treat Yes   Type of Therapy Aquatic Therapy   Location: Neck      Medical Diagnosis from Referral: Cervical dystonia [G24.3]   DDD (degenerative disc disease), cervical [M50.30]   Myofascial pain syndrome, cervical [M79.18]   Evaluation Date: 11/14/2023    Date of Last visit: 1/11/2024  Total Visits Received: 12    No show: 0  C/c: 1    ASSESSMENT      Pt has made great strides in OP PT with combo AQ and land PT. She is encouraged to join an AQ wellness program. [facility] have been known to have programs. Pt will look into an affordable program.    Discharge reason: Patient has met all of his/her goals and (I) in home program besides finding access to a pool.    Discharge FOTO Score: 72 (+19)    Goals:   -Improve right lat flexion by 10 deg for improving mobility.-MET  -Improve left lat flexion by 10 deg for improving mobility.-MET  -Neck strength improved to grossly 4/5 for improved postural control.-MET     Long Term Goals: 8 weeks   Bilateral UE strength improved to grossly 4/5 for improving postural support.-ONGOING  Pt (I) in comprehensive AQ HEP for d/c.-MET    PLAN   This patient is discharged from Physical Therapy.      Marilu Giraldo, PT      [facility]   Physical Therapy Treatment Note     Name: Edith Rodriguez Jackson  Clinic Number: [id number]    Therapy Diagnosis:   Encounter Diagnoses   Name Primary?    Decreased range of motion of neck Yes    Impaired strength of upper extremity      Physician: Miracle Medina, NP    Visit Date: 1/11/2024  Physician Orders: PT Eval and Treat   Question  Answer   Post Surgical? No   Eval and Treat Yes   Type of Therapy Aquatic Therapy   Location: Neck      Medical Diagnosis from Referral: Cervical dystonia [G24.3]   DDD (degenerative disc disease), cervical [M50.30]   Myofascial pain syndrome, cervical [M79.18]   Evaluation Date: 11/14/2023  Authorization Period Expiration:   G24.3 (ICD-10-CM) - Cervical dystonia   M50.30 (ICD-10-CM) - DDD (degenerative disc disease), cervical   M79.18 (ICD-10-CM) - Myofascial pain syndrome, cervical      Plan of Care Expiration: 1/12/2023  Progress Note Due: 12/13/2023  Date of Surgery: na  Visit # / Visits authorized: 1/1, 11/ 20   FOTO: 3/ 3     Time In: 1500  Time Out: 1600  Total Billable Time: 30 minutes (1:1)  Precautions: Standard       SUBJECTIVE     Pt reports: she has not had to take pain meds all week.  She was compliant with home exercise program.  Response to previous treatment: no adverse effects  Functional change: none to date    Pain: 0/10  Location: bilateral neck      OBJECTIVE     Objective Measures updated at progress report unless specified.     12/19/2023:  Observation: impaired FHP, rounded shoulders mildly, hypertrophy of right>Left scalenes and SCMs. (Improving)     Posture: some awareness of posture     Cervical Range of Motion:     Degrees Pain   Flexion 50 -      Extension 74 -      Right Rotation 50 -      Left Rotation 60 -      Right Side Bending 44 -, tight   Left Side Bending 50 -      Shoulder Range of Motion:   Shoulder Left Right   Flexion wnl wnl   Abduction wnl wnl   ER wnl wnl   IR wnl wnl      Strength:  Cervical MMT   Flexion 5/5   Extension 5/5   Right Side Bend 4+/5   Left Side Bend 4+/5      Upper Extremity Strength  (R) UE   (L) UE     Shoulder flexion: 4/5 Shoulder flexion: 4/5   Shoulder Abduction: 4+/5 Shoulder abduction: 4+/5   Shoulder ER 4/5 Shoulder ER 4/5   Shoulder IR 4/5 Shoulder IR 4/5   Elbow flexion: 4/5 Elbow flexion: 4/5   Elbow extension: 4/5 Elbow extension: 4/5   Lower  "Trap 4-/5 Lower Trap 4-/5   Middle Trap 4-/5 Middle Trap 4-/5   Rhomboids 4+/5 Rhomboids 4+/5      Palpation: - right SCM, + Levator Scapulae, - paraspinals, + UT     Sensation: intact bilaterally     Intake Outcome Measure for FOTO NECK Survey     Therapist reviewed FOTO scores for Edith Jackson on 11/14/2023.   FOTO report - see Media section or FOTO account episode details.     Intake Score: 53  Goal: 61  12/1/2023: 75 (+22)  1/11/2024: 72 (+19)         Treatment     Edith received the treatments listed below:      therapeutic exercises to develop strength, endurance, ROM, flexibility, posture, and core stabilization for 60 minutes including:  Corner stretch 3 x 30"  Standing Ts 2 x 10 gtb  Standing wall angels 2 x 10  Shoulder extension black band 3 x 10  Lat pulls blue band 3 x 10  Shoulder INTERNAL ROTATION blue band 3 x 10  OH pulleys with head turns 3 mins  Supine Xs redtb 2 x 10  Supine Ts redtb 2 x 10  Side lying open book with head tracking 10 x 5"     Patient Education and Home Exercises     Home Exercises Provided and Patient Education Provided     Education provided:   - discharge planning    Written Home Exercises Provided: yes. Exercises were reviewed and Edith was able to demonstrate them prior to the end of the session.  Edith demonstrated good  understanding of the education provided. See EMR under Patient Instructions for exercises provided during therapy sessions    ASSESSMENT     Edith is discharged to home program as she only does not have access to a pool at this time, but she is working to gain access somewhere.    Goals: Short Term Goals: 4 weeks   -Improve right lat flexion by 10 deg for improving mobility.-MET  -Improve left lat flexion by 10 deg for improving mobility.-MET  -Neck strength improved to grossly 4/5 for improved postural control.-MET     Long Term Goals: 8 weeks   Bilateral UE strength improved to grossly 4/5 for improving postural support.-ONGOING  Pt " (I) in comprehensive AQ HEP for d/c.-MET    PLAN     D/C TO HOME PROGRAM.    Marilu Giraldo, PT

## 2024-01-11 NOTE — PLAN OF CARE
[facility]  PT Discharge Note     Name: Edith Rodriguez [facility]  Clinic Number: [id number]     Therapy Diagnosis:        Encounter Diagnoses   Name Primary?    Decreased range of motion of neck Yes    Impaired strength of upper extremity        Physician: Miracle Medina, LIAT     Physician Orders: PT Eval and Treat   Question Answer   Post Surgical? No   Eval and Treat Yes   Type of Therapy Aquatic Therapy   Location: Neck      Medical Diagnosis from Referral: Cervical dystonia [G24.3]   DDD (degenerative disc disease), cervical [M50.30]   Myofascial pain syndrome, cervical [M79.18]   Evaluation Date: 11/14/2023     Date of Last visit: 1/11/2024  Total Visits Received: 13     No show: 0  C/c: 1     ASSESSMENT       Pt has made great strides in OP PT with combo AQ and land PT. She is encouraged to join an AQ wellness program. [facility] have been known to have programs. Pt will look into an affordable program.     Discharge reason: Patient has met all of his/her goals and (I) in home program besides finding access to a pool.     Discharge FOTO Score: 72 (+19)     Goals:   -Improve right lat flexion by 10 deg for improving mobility.-MET  -Improve left lat flexion by 10 deg for improving mobility.-MET  -Neck strength improved to grossly 4/5 for improved postural control.-MET     Long Term Goals: 8 weeks   Bilateral UE strength improved to grossly 4/5 for improving postural support.-ONGOING  Pt (I) in comprehensive AQ HEP for d/c.-MET     PLAN   This patient is discharged from Physical Therapy.        Marilu Giraldo, PT       [facility]   Physical Therapy Treatment Note      Name: Edith Rodriguez Jackson  Clinic Number: [id number]     Therapy Diagnosis:        Encounter Diagnoses   Name Primary?    Decreased range of motion of neck Yes    Impaired strength of upper extremity        Physician: Miracle Medina, LIAT     Visit Date: 1/11/2024  Physician Orders:  PT Eval and Treat   Question Answer   Post Surgical? No   Eval and Treat Yes   Type of Therapy Aquatic Therapy   Location: Neck      Medical Diagnosis from Referral: Cervical dystonia [G24.3]   DDD (degenerative disc disease), cervical [M50.30]   Myofascial pain syndrome, cervical [M79.18]   Evaluation Date: 11/14/2023  Authorization Period Expiration:   G24.3 (ICD-10-CM) - Cervical dystonia   M50.30 (ICD-10-CM) - DDD (degenerative disc disease), cervical   M79.18 (ICD-10-CM) - Myofascial pain syndrome, cervical      Plan of Care Expiration: 1/12/2023  Progress Note Due: 12/13/2023  Date of Surgery: na  Visit # / Visits authorized: 1/1, 12/ 20   FOTO: 3/ 3     Time In: 1500  Time Out: 1600  Total Billable Time: 30 minutes (1:1)  Precautions: Standard         SUBJECTIVE      Pt reports: she has not had to take pain meds all week.  She was compliant with home exercise program.  Response to previous treatment: no adverse effects  Functional change: none to date     Pain: 0/10  Location: bilateral neck       OBJECTIVE      Objective Measures updated at progress report unless specified.      12/19/2023:  Observation: impaired FHP, rounded shoulders mildly, hypertrophy of right>Left scalenes and SCMs. (Improving)     Posture: some awareness of posture     Cervical Range of Motion:     Degrees Pain   Flexion 50 -      Extension 74 -      Right Rotation 50 -      Left Rotation 60 -      Right Side Bending 44 -, tight   Left Side Bending 50 -      Shoulder Range of Motion:   Shoulder Left Right   Flexion wnl wnl   Abduction wnl wnl   ER wnl wnl   IR wnl wnl      Strength:  Cervical MMT   Flexion 5/5   Extension 5/5   Right Side Bend 4+/5   Left Side Bend 4+/5      Upper Extremity Strength  (R) UE   (L) UE     Shoulder flexion: 4/5 Shoulder flexion: 4/5   Shoulder Abduction: 4+/5 Shoulder abduction: 4+/5   Shoulder ER 4/5 Shoulder ER 4/5   Shoulder IR 4/5 Shoulder IR 4/5   Elbow flexion: 4/5 Elbow flexion: 4/5   Elbow  "extension: 4/5 Elbow extension: 4/5   Lower Trap 4-/5 Lower Trap 4-/5   Middle Trap 4-/5 Middle Trap 4-/5   Rhomboids 4+/5 Rhomboids 4+/5      Palpation: - right SCM, + Levator Scapulae, - paraspinals, + UT     Sensation: intact bilaterally     Intake Outcome Measure for FOTO NECK Survey     Therapist reviewed FOTO scores for Edith Jackson on 11/14/2023.   FOTO report - see Media section or FOTO account episode details.     Intake Score: 53  Goal: 61  12/1/2023: 75 (+22)  1/11/2024: 72 (+19)         Treatment      Edith received the treatments listed below:       therapeutic exercises to develop strength, endurance, ROM, flexibility, posture, and core stabilization for 60 minutes including:  Corner stretch 3 x 30"  Standing Ts 2 x 10 gtb  Standing wall angels 2 x 10  Shoulder extension black band 3 x 10  Lat pulls blue band 3 x 10  Shoulder INTERNAL ROTATION blue band 3 x 10  OH pulleys with head turns 3 mins  Supine Xs redtb 2 x 10  Supine Ts redtb 2 x 10  Side lying open book with head tracking 10 x 5"     Patient Education and Home Exercises      Home Exercises Provided and Patient Education Provided      Education provided:   - discharge planning     Written Home Exercises Provided: yes. Exercises were reviewed and Edith was able to demonstrate them prior to the end of the session.  Edith demonstrated good  understanding of the education provided. See EMR under Patient Instructions for exercises provided during therapy sessions     ASSESSMENT      Edith is discharged to home program as she only does not have access to a pool at this time, but she is working to gain access somewhere.     Goals: Short Term Goals: 4 weeks   -Improve right lat flexion by 10 deg for improving mobility.-MET  -Improve left lat flexion by 10 deg for improving mobility.-MET  -Neck strength improved to grossly 4/5 for improved postural control.-MET     Long Term Goals: 8 weeks   Bilateral UE strength improved to " grossly 4/5 for improving postural support.-ONGOING  Pt (I) in comprehensive AQ HEP for d/c.-MET     PLAN      D/C TO HOME PROGRAM.     Marilu Giraldo, PT

## 2024-02-27 ENCOUNTER — TELEPHONE (OUTPATIENT)
Dept: CARDIOLOGY | Facility: CLINIC | Age: 69
End: 2024-02-27
Payer: MEDICARE

## 2024-04-23 PROBLEM — R06.83 SNORING: Status: RESOLVED | Noted: 2022-07-29 | Resolved: 2024-04-23

## 2024-04-23 PROBLEM — R09.89 CHRONIC SINUS COMPLAINTS: Status: RESOLVED | Noted: 2022-10-25 | Resolved: 2024-04-23

## 2024-04-23 PROBLEM — F43.9 STRESS: Status: RESOLVED | Noted: 2022-07-29 | Resolved: 2024-04-23

## 2024-04-23 PROBLEM — Z78.9 ENROLLED IN CHRONIC CARE MANAGEMENT: Status: ACTIVE | Noted: 2024-04-23

## 2024-04-23 PROBLEM — M46.02 SPINAL ENTHESOPATHY OF CERVICAL REGION: Status: RESOLVED | Noted: 2023-02-06 | Resolved: 2024-04-23

## 2024-04-23 PROBLEM — Z98.890 STATUS POST HYSTEROSCOPY: Status: RESOLVED | Noted: 2023-01-30 | Resolved: 2024-04-23

## 2024-04-23 PROBLEM — Z91.89 CARDIOVASCULAR EVENT RISK: Status: RESOLVED | Noted: 2022-07-29 | Resolved: 2024-04-23

## 2024-04-23 PROBLEM — R07.89 OTHER CHEST PAIN: Status: RESOLVED | Noted: 2022-10-25 | Resolved: 2024-04-23

## 2024-04-23 PROBLEM — F41.9 ANXIETY: Status: ACTIVE | Noted: 2024-04-23

## 2024-04-23 PROBLEM — L57.8 PHOTOAGED SKIN: Status: RESOLVED | Noted: 2018-02-01 | Resolved: 2024-04-23

## 2024-04-23 PROBLEM — M54.2 NECK PAIN: Status: RESOLVED | Noted: 2022-09-13 | Resolved: 2024-04-23

## 2024-04-23 PROBLEM — E78.2 MIXED HYPERLIPIDEMIA: Status: ACTIVE | Noted: 2024-04-23

## 2024-04-23 PROBLEM — Z82.49 FAMILY HISTORY OF PREMATURE CAD: Status: RESOLVED | Noted: 2022-07-29 | Resolved: 2024-04-23

## 2024-04-23 PROBLEM — G47.19 EXCESSIVE DAYTIME SLEEPINESS: Status: RESOLVED | Noted: 2022-07-29 | Resolved: 2024-04-23

## 2024-09-17 ENCOUNTER — OFFICE VISIT (OUTPATIENT)
Dept: FAMILY MEDICINE | Facility: CLINIC | Age: 69
End: 2024-09-17
Payer: MEDICARE

## 2024-09-17 ENCOUNTER — LAB VISIT (OUTPATIENT)
Dept: LAB | Facility: HOSPITAL | Age: 69
End: 2024-09-17
Attending: NURSE PRACTITIONER
Payer: MEDICARE

## 2024-09-17 VITALS
SYSTOLIC BLOOD PRESSURE: 100 MMHG | WEIGHT: 126.19 LBS | OXYGEN SATURATION: 97 % | HEART RATE: 62 BPM | HEIGHT: 64 IN | BODY MASS INDEX: 21.54 KG/M2 | DIASTOLIC BLOOD PRESSURE: 68 MMHG

## 2024-09-17 DIAGNOSIS — E55.9 VITAMIN D DEFICIENCY: ICD-10-CM

## 2024-09-17 DIAGNOSIS — I10 ESSENTIAL HYPERTENSION: ICD-10-CM

## 2024-09-17 DIAGNOSIS — E03.9 ACQUIRED HYPOTHYROIDISM: ICD-10-CM

## 2024-09-17 DIAGNOSIS — I70.0 AORTIC ATHEROSCLEROSIS: ICD-10-CM

## 2024-09-17 DIAGNOSIS — E03.9 ACQUIRED HYPOTHYROIDISM: Primary | ICD-10-CM

## 2024-09-17 LAB
ALBUMIN SERPL BCP-MCNC: 3.9 G/DL (ref 3.5–5.2)
ALP SERPL-CCNC: 47 U/L (ref 55–135)
ALT SERPL W/O P-5'-P-CCNC: 19 U/L (ref 10–44)
ANION GAP SERPL CALC-SCNC: 10 MMOL/L (ref 8–16)
AST SERPL-CCNC: 22 U/L (ref 10–40)
BASOPHILS # BLD AUTO: 0.04 K/UL (ref 0–0.2)
BASOPHILS NFR BLD: 0.7 % (ref 0–1.9)
BILIRUB SERPL-MCNC: 0.5 MG/DL (ref 0.1–1)
BUN SERPL-MCNC: 12 MG/DL (ref 8–23)
CALCIUM SERPL-MCNC: 9.3 MG/DL (ref 8.7–10.5)
CHLORIDE SERPL-SCNC: 96 MMOL/L (ref 95–110)
CO2 SERPL-SCNC: 24 MMOL/L (ref 23–29)
CREAT SERPL-MCNC: 0.9 MG/DL (ref 0.5–1.4)
DIFFERENTIAL METHOD BLD: ABNORMAL
EOSINOPHIL # BLD AUTO: 0.3 K/UL (ref 0–0.5)
EOSINOPHIL NFR BLD: 4.8 % (ref 0–8)
ERYTHROCYTE [DISTWIDTH] IN BLOOD BY AUTOMATED COUNT: 13.5 % (ref 11.5–14.5)
EST. GFR  (NO RACE VARIABLE): >60 ML/MIN/1.73 M^2
GLUCOSE SERPL-MCNC: 97 MG/DL (ref 70–110)
HCT VFR BLD AUTO: 36.1 % (ref 37–48.5)
HGB BLD-MCNC: 12.4 G/DL (ref 12–16)
IMM GRANULOCYTES # BLD AUTO: 0.01 K/UL (ref 0–0.04)
IMM GRANULOCYTES NFR BLD AUTO: 0.2 % (ref 0–0.5)
LYMPHOCYTES # BLD AUTO: 2.5 K/UL (ref 1–4.8)
LYMPHOCYTES NFR BLD: 41 % (ref 18–48)
MCH RBC QN AUTO: 31.1 PG (ref 27–31)
MCHC RBC AUTO-ENTMCNC: 34.3 G/DL (ref 32–36)
MCV RBC AUTO: 91 FL (ref 82–98)
MONOCYTES # BLD AUTO: 0.6 K/UL (ref 0.3–1)
MONOCYTES NFR BLD: 9 % (ref 4–15)
NEUTROPHILS # BLD AUTO: 2.7 K/UL (ref 1.8–7.7)
NEUTROPHILS NFR BLD: 44.3 % (ref 38–73)
NRBC BLD-RTO: 0 /100 WBC
PLATELET # BLD AUTO: 208 K/UL (ref 150–450)
PMV BLD AUTO: 8.7 FL (ref 9.2–12.9)
POTASSIUM SERPL-SCNC: 4.5 MMOL/L (ref 3.5–5.1)
PROT SERPL-MCNC: 6.7 G/DL (ref 6–8.4)
RBC # BLD AUTO: 3.99 M/UL (ref 4–5.4)
SODIUM SERPL-SCNC: 130 MMOL/L (ref 136–145)
T4 FREE SERPL-MCNC: 1.06 NG/DL (ref 0.71–1.51)
TSH SERPL DL<=0.005 MIU/L-ACNC: 2.48 UIU/ML (ref 0.4–4)
WBC # BLD AUTO: 6.1 K/UL (ref 3.9–12.7)

## 2024-09-17 PROCEDURE — 80053 COMPREHEN METABOLIC PANEL: CPT | Performed by: NURSE PRACTITIONER

## 2024-09-17 PROCEDURE — 36415 COLL VENOUS BLD VENIPUNCTURE: CPT | Performed by: NURSE PRACTITIONER

## 2024-09-17 PROCEDURE — 99214 OFFICE O/P EST MOD 30 MIN: CPT | Mod: S$PBB,,, | Performed by: NURSE PRACTITIONER

## 2024-09-17 PROCEDURE — 99213 OFFICE O/P EST LOW 20 MIN: CPT | Mod: PBBFAC | Performed by: NURSE PRACTITIONER

## 2024-09-17 PROCEDURE — 85025 COMPLETE CBC W/AUTO DIFF WBC: CPT | Performed by: NURSE PRACTITIONER

## 2024-09-17 PROCEDURE — 84443 ASSAY THYROID STIM HORMONE: CPT | Performed by: NURSE PRACTITIONER

## 2024-09-17 PROCEDURE — 99999 PR PBB SHADOW E&M-EST. PATIENT-LVL III: CPT | Mod: PBBFAC,,, | Performed by: NURSE PRACTITIONER

## 2024-09-17 PROCEDURE — 84439 ASSAY OF FREE THYROXINE: CPT | Performed by: NURSE PRACTITIONER

## 2024-09-17 RX ORDER — CHOLECALCIFEROL (VITAMIN D3) 25 MCG
1000 TABLET ORAL DAILY
COMMUNITY

## 2024-09-17 NOTE — PROGRESS NOTES
Subjective:       Patient ID: Edith Jackson is a 69 y.o. female.    Chief Complaint: Establish Care  -  The pt is a 70 y/o female that presents to [facility]. Was a [place] pt but moved to [place] and is now back in [place].    Is under the care of Dr. Nima Hairston with [facility].     Has hypothyroidism with h/o taking both synthroid and LP thyroid together d/t low T3 but self stopped NP synthroid months ago as it made her exhausted and depressed.     Due for mammo but reports only getting every 2 yrs due to radiation exposure. H/o updated by NP.     Vit D def with level of 42 5/2024 taking 1,000 iu daily thus rec inc to 2,000    HCC:  Aortic atherosclerosis; pt reports known h/o taking crestor with dose inc after calcium scoring inc to 593. Under the care of cardiology.     Past Medical History:   Diagnosis Date    Acquired hypothyroidism     Arthritis     Asthma     H/O: pneumonia     Hypertension     Hypotension, iatrogenic     post-op    Osteopenia     Post-menopausal bleeding 01/2023    Respiratory distress        Past Surgical History:   Procedure Laterality Date    AUGMENTATION OF BREAST Bilateral     removal    BREAST RECONSTRUCTION      COLONOSCOPY N/A 03/17/2022    Procedure: COLONOSCOPY;  Surgeon: Alessandra Springer MD;  Location: [facility];  Service: General;  Laterality: N/A;    EPIDURAL STEROID INJECTION INTO CERVICAL SPINE N/A 01/13/2023    Procedure: Injection-steroid-epidural-cervical;  Surgeon: Larry Thomason MD;  Location: [facility] OR;  Service: Pain Management;  Laterality: N/A;  C7-T1    HIP REPLACEMENT ARTHROPLASTY Right     HYSTEROSCOPY WITH DILATION AND CURETTAGE OF UTERUS N/A 1/30/2023    Procedure: HYSTEROSCOPY, WITH DILATION AND CURETTAGE OF UTERUS;possible myosure;  Surgeon: Carie Dwyer MD;  Location: [facility];  Service: OB/GYN;  Laterality: N/A;    INJECTION OF ANESTHETIC AGENT AROUND MEDIAL BRANCH NERVES INNERVATING CERVICAL FACET JOINT Right 5/26/2023    Procedure:  Block-nerve-medial branch-cervical C3-C4-C5-C6;  Surgeon: Larry Thomason MD;  Location: [facility] OR;  Service: Pain Management;  Laterality: Right;    INJECTION OF ANESTHETIC AGENT AROUND MEDIAL BRANCH NERVES INNERVATING CERVICAL FACET JOINT Right 6/21/2023    Procedure: Block-nerve-medial branch-cervical;  Surgeon: Larry Thomason MD;  Location: [facility] OR;  Service: Pain Management;  Laterality: Right;  c3,4,5,6 Mbb #2    JOINT REPLACEMENT      RADIOFREQUENCY THERMAL COAGULATION OF MEDIAL BRANCH OF POSTERIOR RAMUS OF CERVICAL SPINAL NERVE Right 7/19/2023    Procedure: RADIOFREQUENCY THERMAL COAGULATION, NERVE, SPINAL, CERVICAL, POSTERIOR RAMUS, MEDIAL BRANCH;  Surgeon: Larry Thomason MD;  Location: [facility] OR;  Service: Anesthesiology;  Laterality: Right;  C3,4,5,6 RFA    TRANSFORAMINAL EPIDURAL INJECTION OF STEROID Right 3/17/2023    Procedure: Injection,steroid,cervical,transforaminal,   Right C6-C7, C7-T1;  Surgeon: Larry Thomason MD;  Location: [facility] OR;  Service: Pain Management;  Laterality: Right;        Social History     Socioeconomic History    Marital status:     Number of children: 2    Highest education level: Master's degree (e.g., MA, MS, [place], MEd, MSW, RUSS)   Occupational History    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   Tobacco Use    Smoking status: Never    Smokeless tobacco: Never   Substance and Sexual Activity    Alcohol use: Yes     Alcohol/week: 2.0 standard drinks of alcohol     Types: 2 Glasses of wine per week     Comment: socially    Drug use: Never    Sexual activity: Yes     Partners: Male     Social Determinants of Health     Financial Resource Strain: Low Risk  (9/17/2024)    Overall Financial Resource Strain (CARDIA)     Difficulty of Paying Living Expenses: Not hard at all   Food Insecurity: No Food Insecurity (9/17/2024)    Hunger Vital Sign     Worried About Running Out of Food in the Last Year: Never true     Ran Out of Food in the Last Year: Never true   Transportation Needs: Unknown  (3/11/2022)    PRAPARE - Transportation     Lack of Transportation (Medical): Patient declined     Lack of Transportation (Non-Medical): Patient declined   Physical Activity: Unknown (9/17/2024)    Exercise Vital Sign     Days of Exercise per Week: Patient declined   Stress: Patient Declined (9/17/2024)    [employer] - Occupational Stress Questionnaire     Feeling of Stress : Patient declined   Housing Stability: Unknown (9/17/2024)    Housing Stability Vital Sign     Unable to Pay for Housing in the Last Year: No       Family History   Problem Relation Name Age of Onset    Heart disease Mother      Diabetes Mother      Stroke Mother      Heart attack Mother      Thyroid disease Mother      Heart attack Father  74    Thyroid disease Sister      Heart disease Brother          stents x2    Breast cancer Neg Hx      Ovarian cancer Neg Hx         Review of patient's allergies indicates:   Allergen Reactions    Percocet [oxycodone-acetaminophen] Other (See Comments)     Headaches    Penicillins Rash          Current Outpatient Medications:     amLODIPine (NORVASC) 2.5 MG tablet, , Disp: , Rfl:     FLUoxetine 10 MG capsule, Take 1 capsule (10 mg total) by mouth once daily., Disp: 30 capsule, Rfl: 1    levothyroxine (SYNTHROID) 50 MCG tablet, Take 1 tablet (50 mcg total) by mouth before breakfast., Disp: 90 tablet, Rfl: 1    metoprolol succinate (TOPROL-XL) 50 MG 24 hr tablet, Take 1 tablet (50 mg total) by mouth once daily., Disp: 90 tablet, Rfl: 1    rosuvastatin (CRESTOR) 20 MG tablet, , Disp: , Rfl:     traZODone (DESYREL) 50 MG tablet, Take 1 tablet (50 mg total) by mouth every evening., Disp: 90 tablet, Rfl: 2    vitamin D (VITAMIN D3) 1000 units Tab, Take 1,000 Units by mouth once daily., Disp: , Rfl:   No current facility-administered medications for this visit.    HPI  Review of Systems   Constitutional: Negative.    HENT: Negative.     Eyes: Negative.    Respiratory: Negative.      Cardiovascular: Negative.    Gastrointestinal: Negative.    Endocrine: Negative.    Genitourinary: Negative.    Musculoskeletal: Negative.    Skin: Negative.    Allergic/Immunologic: Negative.    Neurological: Negative.    Hematological: Negative.    Psychiatric/Behavioral: Negative.         Objective:      Physical Exam  Vitals and nursing note reviewed.   Constitutional:       General: She is not in acute distress.     Appearance: Normal appearance. She is well-developed and normal weight. She is not ill-appearing.   HENT:      Head: Normocephalic.   Eyes:      Conjunctiva/sclera: Conjunctivae normal.   Neck:      Thyroid: No thyromegaly.   Cardiovascular:      Rate and Rhythm: Normal rate and regular rhythm.      Heart sounds: Normal heart sounds. No murmur heard.  Pulmonary:      Effort: Pulmonary effort is normal.      Breath sounds: Normal breath sounds. No wheezing or rales.   Musculoskeletal:         General: Normal range of motion.      Cervical back: Normal range of motion.      Right lower leg: No edema.      Left lower leg: No edema.   Skin:     General: Skin is warm and dry.   Neurological:      Mental Status: She is alert and oriented to person, place, and time. Mental status is at baseline.   Psychiatric:         Mood and Affect: Mood normal.         Behavior: Behavior normal.         Thought Content: Thought content normal.         Judgment: Judgment normal.         Assessment:       1. Acquired hypothyroidism    2. Aortic atherosclerosis    3. Essential hypertension, dx 2000    4. Vitamin D deficiency        Plan:     1- NF labs today  2- RTC 3 mo  -    Acquired hypothyroidism  -     CBC Auto Differential; Future; Expected date: 09/17/2024  -     Comprehensive Metabolic Panel; Future; Expected date: 09/17/2024  -     TSH; Future; Expected date: 09/17/2024  -     T4, Free; Future; Expected date: 09/17/2024    Aortic atherosclerosis    Essential hypertension, dx 2000  -     CBC Auto Differential;  Future; Expected date: 09/17/2024  -     Comprehensive Metabolic Panel; Future; Expected date: 09/17/2024    Vitamin D deficiency        Risks, benefits, and side effects were discussed with the patient. All questions were answered to the fullest satisfaction of the patient, and pt verbalized understanding and agreement to treatment plan. Pt was to call with any new or worsening symptoms, or present to the ER.

## 2024-10-01 DIAGNOSIS — F41.9 ANXIETY: ICD-10-CM

## 2024-10-01 DIAGNOSIS — I10 ESSENTIAL HYPERTENSION: ICD-10-CM

## 2024-10-01 DIAGNOSIS — E03.9 HYPOTHYROIDISM, UNSPECIFIED TYPE: ICD-10-CM

## 2024-10-01 DIAGNOSIS — I70.0 AORTIC ATHEROSCLEROSIS: Primary | ICD-10-CM

## 2024-10-01 NOTE — TELEPHONE ENCOUNTER
Pt no longer seeing Dr. Ivey. Pt requesting RX to be sent under NP name. Pended to NP for approval

## 2024-10-01 NOTE — TELEPHONE ENCOUNTER
----- Message from Carmen sent at 10/1/2024 11:29 AM CDT -----  Contact: PT  Type: Needs Medical Advice    Who Called: PT  Best Call Back Number: [phone]  Additional  Information: PT  asking for the nurse to give her a call back to discuss some medications that they were left off her list. Would like to get  it straighten out  Please Advise- Thank you

## 2024-10-06 RX ORDER — ASPIRIN 81 MG/1
81 TABLET ORAL DAILY
Qty: 90 TABLET | Refills: 3 | Status: SHIPPED | OUTPATIENT
Start: 2024-10-06 | End: 2025-10-06

## 2024-10-06 RX ORDER — LEVOTHYROXINE SODIUM 50 UG/1
50 TABLET ORAL
Qty: 90 TABLET | Refills: 1 | Status: SHIPPED | OUTPATIENT
Start: 2024-10-06

## 2024-10-06 RX ORDER — ROSUVASTATIN CALCIUM 20 MG/1
20 TABLET, COATED ORAL NIGHTLY
Qty: 90 TABLET | Refills: 0 | Status: SHIPPED | OUTPATIENT
Start: 2024-10-06

## 2024-10-06 RX ORDER — METOPROLOL SUCCINATE 50 MG/1
50 TABLET, EXTENDED RELEASE ORAL DAILY
Qty: 90 TABLET | Refills: 1 | Status: SHIPPED | OUTPATIENT
Start: 2024-10-06

## 2024-10-15 ENCOUNTER — OFFICE VISIT (OUTPATIENT)
Dept: OPTOMETRY | Facility: CLINIC | Age: 69
End: 2024-10-15
Payer: MEDICARE

## 2024-10-15 DIAGNOSIS — H16.143 SPK (SUPERFICIAL PUNCTATE KERATITIS), BILATERAL: Primary | ICD-10-CM

## 2024-10-15 DIAGNOSIS — H25.13 NUCLEAR SCLEROSIS, BILATERAL: ICD-10-CM

## 2024-10-15 DIAGNOSIS — H04.123 DRY EYE SYNDROME, BILATERAL: ICD-10-CM

## 2024-10-15 PROCEDURE — 99999 PR PBB SHADOW E&M-EST. PATIENT-LVL II: CPT | Mod: PBBFAC,,, | Performed by: OPTOMETRIST

## 2024-10-15 PROCEDURE — 99212 OFFICE O/P EST SF 10 MIN: CPT | Mod: PBBFAC,PO | Performed by: OPTOMETRIST

## 2024-10-15 PROCEDURE — 99204 OFFICE O/P NEW MOD 45 MIN: CPT | Mod: S$PBB,,, | Performed by: OPTOMETRIST

## 2024-10-15 RX ORDER — PREDNISOLONE ACETATE 10 MG/ML
1 SUSPENSION/ DROPS OPHTHALMIC 4 TIMES DAILY
Qty: 10 ML | Refills: 1 | Status: SHIPPED | OUTPATIENT
Start: 2024-10-15

## 2024-10-15 NOTE — PROGRESS NOTES
HPI    Pt states cloudiness in OS, for about 3 weeks now. States it stays,   doesn't really come and go  Some itchiness, watering. Waking up to the eye sticky  Tried an old antibiotic gtts she had but didn't help    (-)gtts  (-)  (+)floaters, occasional FOL    ROHIT ~ 1 year ago in [place]       Last edited by Bismark Trinidad, OD on 10/15/2024  8:31 AM.            Assessment /Plan     For exam results, see Encounter Report.    SPK (superficial punctate keratitis), bilateral  -     prednisoLONE acetate (PRED FORTE) 1 % DrpS; Place 1 drop into both eyes 4 (four) times daily.  Dispense: 10 mL; Refill: 1    Dry eye syndrome, bilateral    Nuclear sclerosis, bilateral      1. SPK (superficial punctate keratitis), bilateral (Primary)  Discussed findings  Start PF 1%: 1 gt qid OU, shake well before use  F/U in 2 weeks, sooner if any worsening    - prednisoLONE acetate (PRED FORTE) 1 % DrpS; Place 1 drop into both eyes 4 (four) times daily.  Dispense: 10 mL; Refill: 1    2. Dry eye syndrome, bilateral  Discussed ocular affects of dry eyes. Recommend OTC Ivizia artificial tears 4 times a day in both eyes.   Discussed chronicity of FAN.   RTC if symptoms not alleviated by continued use of artificial tears.     3. Nuclear sclerosis, bilateral  Mild to moderate OU, not VS  Recheck with DFE / refraction  when SPK resolved

## 2024-10-29 ENCOUNTER — OFFICE VISIT (OUTPATIENT)
Dept: OPTOMETRY | Facility: CLINIC | Age: 69
End: 2024-10-29
Payer: MEDICARE

## 2024-10-29 DIAGNOSIS — H04.123 DRY EYE SYNDROME, BILATERAL: Primary | ICD-10-CM

## 2024-10-29 DIAGNOSIS — H25.13 NUCLEAR SCLEROSIS, BILATERAL: ICD-10-CM

## 2024-10-29 DIAGNOSIS — H16.143 SPK (SUPERFICIAL PUNCTATE KERATITIS), BILATERAL: ICD-10-CM

## 2024-10-29 PROCEDURE — 68761 CLOSE TEAR DUCT OPENING: CPT | Mod: E4,E2,PBBFAC,PO | Performed by: OPTOMETRIST

## 2024-10-29 PROCEDURE — 68761 CLOSE TEAR DUCT OPENING: CPT | Mod: 50,S$PBB,, | Performed by: OPTOMETRIST

## 2024-10-29 PROCEDURE — 99212 OFFICE O/P EST SF 10 MIN: CPT | Mod: PBBFAC,PO | Performed by: OPTOMETRIST

## 2024-10-29 PROCEDURE — 99999 PR PBB SHADOW E&M-EST. PATIENT-LVL II: CPT | Mod: PBBFAC,,, | Performed by: OPTOMETRIST

## 2024-10-29 PROCEDURE — 99213 OFFICE O/P EST LOW 20 MIN: CPT | Mod: 25,S$PBB,, | Performed by: OPTOMETRIST

## 2024-11-07 ENCOUNTER — OFFICE VISIT (OUTPATIENT)
Dept: FAMILY MEDICINE | Facility: CLINIC | Age: 69
End: 2024-11-07
Payer: MEDICARE

## 2024-11-07 ENCOUNTER — HOSPITAL ENCOUNTER (OUTPATIENT)
Dept: RADIOLOGY | Facility: HOSPITAL | Age: 69
Discharge: HOME | End: 2024-11-07
Attending: FAMILY MEDICINE
Payer: MEDICARE

## 2024-11-07 VITALS
DIASTOLIC BLOOD PRESSURE: 86 MMHG | HEART RATE: 68 BPM | SYSTOLIC BLOOD PRESSURE: 134 MMHG | BODY MASS INDEX: 21.24 KG/M2 | HEIGHT: 64 IN | RESPIRATION RATE: 14 BRPM | OXYGEN SATURATION: 98 % | WEIGHT: 124.38 LBS

## 2024-11-07 DIAGNOSIS — R05.1 ACUTE COUGH: ICD-10-CM

## 2024-11-07 DIAGNOSIS — H91.93 DECREASED HEARING OF BOTH EARS: Primary | ICD-10-CM

## 2024-11-07 PROCEDURE — 99999 PR PBB SHADOW E&M-EST. PATIENT-LVL V: CPT | Mod: PBBFAC,,, | Performed by: FAMILY MEDICINE

## 2024-11-07 PROCEDURE — 71046 X-RAY EXAM CHEST 2 VIEWS: CPT | Mod: 26,,, | Performed by: RADIOLOGY

## 2024-11-07 PROCEDURE — 71046 X-RAY EXAM CHEST 2 VIEWS: CPT | Mod: TC

## 2024-11-07 PROCEDURE — 99215 OFFICE O/P EST HI 40 MIN: CPT | Mod: PBBFAC | Performed by: FAMILY MEDICINE

## 2024-11-07 PROCEDURE — 99214 OFFICE O/P EST MOD 30 MIN: CPT | Mod: S$PBB,,, | Performed by: FAMILY MEDICINE

## 2024-11-07 RX ORDER — PREDNISONE 20 MG/1
20 TABLET ORAL DAILY
Qty: 5 TABLET | Refills: 0 | Status: SHIPPED | OUTPATIENT
Start: 2024-11-07

## 2024-11-07 NOTE — PROGRESS NOTES
Patient ID: Edith Jackson is a 69 y.o. female.    Chief Complaint: Ear Fullness (X 2 weeks)    History of Present Illness    CHIEF COMPLAINT:  Edith presents today for ear problems following a recent COVID-19 infection.    RECENT COVID-19 INFECTION:  She was diagnosed with COVID-19 approximately 10 days ago. Symptoms began around October 21st, initially mistaken for a cold. She realized she was ill while in [place], particularly when going up a mountain.    EAR PROBLEMS:  She visited urgent care due to ear issues that began around October 21st. She reports hearing loss and a feeling of fullness in both ears, but denies current ear pain. She completed a 5-day course of antibiotics for ear infection, during which she experienced ear popping. Despite treatment, she reports persistent hearing difficulties.    MEDICATIONS:  She completed a 5-day course of Levaquin 10 days ago. She was also given cough syrup and throat lozenges at urgent care. She only took the cough syrup once due to severe dry mouth but continued to use the lozenges. She denies being given steroids for her current illness, despite requesting them at urgent care.    MEDICAL HISTORY:  She has a history of sinus problems, including previous sinus surgery on the left side, which has significantly improved her sinus infections. She also mentions a history of polyps in her sinuses. She discloses a history of asthma, which began after moving back to a previous residence, necessitating shots for management.    ALLERGIES:  She reports a severe allergy to mold.      ROS:  ROS as indicated in HPI.         Physical Exam    General: No acute distress. Well-developed. Well-nourished.  Eyes: EOMI. Sclerae anicteric.  HENT: Normocephalic. Atraumatic. Nares patent. Moist oral mucosa.  Ears: Left eardrum retracted. Cyst or skin tag in left ear canal.  Cardiovascular: Regular rate. Regular rhythm. No murmurs. No rubs. No gallops. Normal S1, S2.  Respiratory:  Normal respiratory effort. Clear to auscultation bilaterally. No rales. No rhonchi. No wheezing.  Musculoskeletal: No  obvious deformity.  Extremities: No lower extremity edema.  Neurological: Alert & oriented x3. No slurred speech. Normal gait.  Psychiatric: Normal mood. Normal affect. Good insight. Good judgment.  Skin: Warm. Dry. No rash.         Assessment & Plan    Assessed patient's ear complaints following recent COVID-19 infection  Examined ears: left eardrum retracted, indicating possible pressure from swelling; right ear canal shows growth resembling cyst or skin tag  Considered ENT referral for further evaluation of hearing loss and abnormal ear findings    HEARING LOSS AND EAR ABNORMALITIES:  - Referred to Dr. Okeefe (ENT) for evaluation of hearing loss and ear abnormalities.  - Their office will contact patient for appointment within the next week.          1. Decreased hearing of both ears (Primary)    - Ambulatory referral/consult to ENT; Future  - Ambulatory referral/consult to Audiology; Future  - predniSONE (DELTASONE) 20 MG tablet; Take 1 tablet (20 mg total) by mouth once daily.  Dispense: 5 tablet; Refill: 0    2. Acute cough    - X-Ray Chest PA And Lateral; Future       Follow up if symptoms worsen or fail to improve.    This note was generated with the assistance of ambient listening technology. Verbal consent was obtained by the patient and accompanying visitor(s) for the recording of patient appointment to facilitate this note. I attest to having reviewed and edited the generated note for accuracy, though some syntax or spelling errors may persist. Please contact the author of this note for any clarification.

## 2024-11-12 ENCOUNTER — OFFICE VISIT (OUTPATIENT)
Dept: OTOLARYNGOLOGY | Facility: CLINIC | Age: 69
End: 2024-11-12
Payer: MEDICARE

## 2024-11-12 VITALS — WEIGHT: 126.81 LBS | HEIGHT: 64 IN | BODY MASS INDEX: 21.65 KG/M2

## 2024-11-12 DIAGNOSIS — T70.0XXA: ICD-10-CM

## 2024-11-12 DIAGNOSIS — H91.93 DECREASED HEARING OF BOTH EARS: Primary | ICD-10-CM

## 2024-11-12 DIAGNOSIS — R43.0 ANOSMIA: ICD-10-CM

## 2024-11-12 PROCEDURE — 99999 PR PBB SHADOW E&M-EST. PATIENT-LVL III: CPT | Mod: PBBFAC,,, | Performed by: OTOLARYNGOLOGY

## 2024-11-12 PROCEDURE — 99203 OFFICE O/P NEW LOW 30 MIN: CPT | Mod: S$PBB,,, | Performed by: OTOLARYNGOLOGY

## 2024-11-12 PROCEDURE — 99213 OFFICE O/P EST LOW 20 MIN: CPT | Mod: PBBFAC,PN | Performed by: OTOLARYNGOLOGY

## 2024-11-12 NOTE — PROGRESS NOTES
Subjective:       Patient ID: Edith Jackson is a 69 y.o. female.    Chief Complaint: Hearing Loss (Pt c/o decreased hearing of both ears for a few weeks )      This generally healthy 69-year-old tells me that about three weeks ago in the context of travel she developed a significant COVID infection which included sinus and nasal symptoms and prominent ear pressure and reduced hearing.  She has a history of hearing reduction with a cookie cutter pattern she tells me this was investigated a few years ago in the context of positional vertigo that was treated in [place] that audiogram is not available on the electronic medical record     Since that time she had dramatic reduction in her already impaired hearing especially on the right side she was seen by primary care at one point she was given five days of Levaquin during the tail end of the COVID infection and just finished a five day regimen of 20 mg prednisone q.day. she has significant nasal congestion in his had reduction in smell and taste since the COVID infection     She has a history of sinus and nasal surgery with a history of sinus and nasal polyps (she was told polyps) and a septoplasty done years ago in [place].  This has been very helpful to her historically and she has not had much in the way of sinus and nasal flare-ups until this most recent event.          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 near the tympanic membrane    The right tympanic membrane does not appear to have an effusion but it has a significant bruising between the layers of the tympanic membrane in the superior aspect around the malleus in generalized dullness of the eardrum but I do not think she has a persistent effusion or a bloody effusion or if so that is contained in the upper portion of the middle ear space    Her nasal  exam looks pretty good she has a midline septum/status post septoplasty and that is no evidence of significant inflammation or infection at least on anterior rhinoscopy her oropharynx looks normal        Assessment:       1. Decreased hearing of both ears    2. Otitic barotrauma of left ear    3. Anosmia         Plan:          For now I think we just need to give this little more time she has had some antibiotics and some steroids I think this is all post COVID damage that needs to heal up +the barotrauma from an airplane flight I want to see her back in a few weeks but I have asked her to contact me if there are any issues that need additional attention like if she does not make some progress with her sinus and nasal congestion or she notices a flare-up and any of her symptoms between now and I return visit in about three weeks at which time we will reexamine and probably do an audiogram just to see what her baseline is I think she has a pretty significant underlying hearing loss that needs consideration

## 2024-11-14 ENCOUNTER — PATIENT MESSAGE (OUTPATIENT)
Dept: FAMILY MEDICINE | Facility: CLINIC | Age: 69
End: 2024-11-14
Payer: MEDICARE

## 2024-11-14 DIAGNOSIS — R06.02 SHORTNESS OF BREATH: Primary | ICD-10-CM

## 2024-11-14 RX ORDER — ALBUTEROL SULFATE 90 UG/1
2 INHALANT RESPIRATORY (INHALATION) EVERY 6 HOURS PRN
Qty: 18 G | Refills: 1 | Status: SHIPPED | OUTPATIENT
Start: 2024-11-14

## 2024-11-20 ENCOUNTER — PATIENT MESSAGE (OUTPATIENT)
Dept: FAMILY MEDICINE | Facility: CLINIC | Age: 69
End: 2024-11-20
Payer: MEDICARE

## 2024-12-03 ENCOUNTER — OFFICE VISIT (OUTPATIENT)
Dept: OPTOMETRY | Facility: CLINIC | Age: 69
End: 2024-12-03
Payer: MEDICARE

## 2024-12-03 DIAGNOSIS — H04.123 DRY EYE SYNDROME, BILATERAL: Primary | ICD-10-CM

## 2024-12-03 DIAGNOSIS — H26.9 CORTICAL CATARACT: ICD-10-CM

## 2024-12-03 DIAGNOSIS — H52.7 REFRACTIVE ERROR: ICD-10-CM

## 2024-12-03 DIAGNOSIS — H25.13 NUCLEAR SCLEROSIS, BILATERAL: ICD-10-CM

## 2024-12-03 PROCEDURE — 99213 OFFICE O/P EST LOW 20 MIN: CPT | Mod: PBBFAC,PO | Performed by: OPTOMETRIST

## 2024-12-03 PROCEDURE — 99999 PR PBB SHADOW E&M-EST. PATIENT-LVL III: CPT | Mod: PBBFAC,,, | Performed by: OPTOMETRIST

## 2024-12-03 PROCEDURE — 92014 COMPRE OPH EXAM EST PT 1/>: CPT | Mod: S$PBB,,, | Performed by: OPTOMETRIST

## 2024-12-03 PROCEDURE — 92015 DETERMINE REFRACTIVE STATE: CPT | Mod: ,,, | Performed by: OPTOMETRIST

## 2024-12-04 ENCOUNTER — OFFICE VISIT (OUTPATIENT)
Dept: OTOLARYNGOLOGY | Facility: CLINIC | Age: 69
End: 2024-12-04
Payer: MEDICARE

## 2024-12-04 ENCOUNTER — CLINICAL SUPPORT (OUTPATIENT)
Dept: AUDIOLOGY | Facility: CLINIC | Age: 69
End: 2024-12-04
Payer: MEDICARE

## 2024-12-04 VITALS — WEIGHT: 129.38 LBS | BODY MASS INDEX: 22.09 KG/M2 | HEIGHT: 64 IN

## 2024-12-04 DIAGNOSIS — H91.93 BILATERAL HEARING LOSS, UNSPECIFIED HEARING LOSS TYPE: ICD-10-CM

## 2024-12-04 DIAGNOSIS — T70.0XXD OTITIC BAROTRAUMA, SUBSEQUENT ENCOUNTER: Primary | ICD-10-CM

## 2024-12-04 DIAGNOSIS — H90.3 SENSORINEURAL HEARING LOSS (SNHL), BILATERAL: Primary | ICD-10-CM

## 2024-12-04 DIAGNOSIS — H93.13 TINNITUS AURIUM, BILATERAL: ICD-10-CM

## 2024-12-04 PROCEDURE — 99213 OFFICE O/P EST LOW 20 MIN: CPT | Mod: PBBFAC,PN | Performed by: OTOLARYNGOLOGY

## 2024-12-04 PROCEDURE — 99213 OFFICE O/P EST LOW 20 MIN: CPT | Mod: S$PBB,,, | Performed by: OTOLARYNGOLOGY

## 2024-12-04 PROCEDURE — 92557 COMPREHENSIVE HEARING TEST: CPT | Mod: PBBFAC,PN | Performed by: AUDIOLOGIST

## 2024-12-04 PROCEDURE — 92567 TYMPANOMETRY: CPT | Mod: PBBFAC,PN | Performed by: AUDIOLOGIST

## 2024-12-04 PROCEDURE — 99999 PR PBB SHADOW E&M-EST. PATIENT-LVL III: CPT | Mod: PBBFAC,,, | Performed by: OTOLARYNGOLOGY

## 2024-12-04 NOTE — PROGRESS NOTES
Edith Jackson was seen 12/04/2024 by Dr. Okeefe and referred for an audiological evaluation. Pt was alone during today's visit. Pertinent complaints today include hearing loss and tinnitus. Pt states she lost hearing in her right ear after a flight a few weeks ago. She says she has hearing loss in both ears, but her right ear was better than her left ear prior to her flight. Otoscopy revealed no cerumen in both ears. The tympanic membrane was visualized AU prior to proceeding with the hearing testing.     Results reveal a mild-to-moderate sensorineural hearing loss for the right ear and  mild-to-moderately severesensorineural hearing loss for the left ear.    Speech Reception Thresholds were  50 dBHL for the right ear and 60 dBHL for the left ear.    Word recognition scores were excellent for the right ear and fair for the left ear.   Tympanograms were Type A for the right ear and Type A for the left ear.    Audiogram results were reviewed in detail with patient and all questions were answered. Results will be reviewed by the referring provider at the completion of this note. All complaints were addressed during this visit to the patient's satisfaction.    Recommendations:  Follow up with ENT  Annual audiogram  Hearing protection in noise  Hearing aid consultation when ready

## 2024-12-04 NOTE — PROGRESS NOTES
Subjective:       Patient ID: Edith Jackson is a 69 y.o. female.    Chief Complaint: Follow-up (Pt is here to follow up on hearing loss)      Since I saw her a few weeks ago she has seen improvement in the hearing in her right ear which was temporarily impaired by barotrauma from an airplane flight but she has underlying hearing loss and is also interested in having that checked today.        Objective:      ENT Physical Exam    The abnormal appearance of the right tympanic membrane has improved, it looks normal today.  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              Assessment:       1. Otitic barotrauma, subsequent encounter    2. Bilateral hearing loss, unspecified hearing loss type         Plan:          We reviewed her audiogram which shows a cookie bite type pattern with moderate loss in the mid frequencies and less loss in the low and high frequencies.  We have discussed her options as far as considering hearing aids and obtaining them and the various pros and cons including a discussion of the higher end over-the-counter versions, [employer], and audiologist dispensed hearing aids in general.

## 2024-12-18 ENCOUNTER — OFFICE VISIT (OUTPATIENT)
Dept: FAMILY MEDICINE | Facility: CLINIC | Age: 69
End: 2024-12-18
Payer: MEDICARE

## 2024-12-18 ENCOUNTER — TELEPHONE (OUTPATIENT)
Dept: FAMILY MEDICINE | Facility: CLINIC | Age: 69
End: 2024-12-18
Payer: MEDICARE

## 2024-12-18 VITALS
HEIGHT: 64 IN | WEIGHT: 125.38 LBS | DIASTOLIC BLOOD PRESSURE: 70 MMHG | SYSTOLIC BLOOD PRESSURE: 120 MMHG | BODY MASS INDEX: 21.4 KG/M2 | HEART RATE: 58 BPM | OXYGEN SATURATION: 99 %

## 2024-12-18 DIAGNOSIS — Z78.0 ENCOUNTER FOR OSTEOPOROSIS SCREENING IN ASYMPTOMATIC POSTMENOPAUSAL PATIENT: ICD-10-CM

## 2024-12-18 DIAGNOSIS — Z13.820 ENCOUNTER FOR OSTEOPOROSIS SCREENING IN ASYMPTOMATIC POSTMENOPAUSAL PATIENT: ICD-10-CM

## 2024-12-18 DIAGNOSIS — E53.8 VITAMIN B 12 DEFICIENCY: ICD-10-CM

## 2024-12-18 DIAGNOSIS — E55.9 VITAMIN D DEFICIENCY: ICD-10-CM

## 2024-12-18 DIAGNOSIS — E78.2 MIXED HYPERLIPIDEMIA: ICD-10-CM

## 2024-12-18 DIAGNOSIS — R53.82 CHRONIC FATIGUE: ICD-10-CM

## 2024-12-18 DIAGNOSIS — R73.01 ELEVATED FASTING GLUCOSE: ICD-10-CM

## 2024-12-18 DIAGNOSIS — E83.19 IRON OVERLOAD: ICD-10-CM

## 2024-12-18 DIAGNOSIS — M85.89 OSTEOPENIA OF MULTIPLE SITES: ICD-10-CM

## 2024-12-18 DIAGNOSIS — E03.9 ACQUIRED HYPOTHYROIDISM: ICD-10-CM

## 2024-12-18 DIAGNOSIS — E03.9 ACQUIRED HYPOTHYROIDISM: Primary | ICD-10-CM

## 2024-12-18 DIAGNOSIS — Z00.00 WELL ADULT EXAM: Primary | ICD-10-CM

## 2024-12-18 PROCEDURE — 99213 OFFICE O/P EST LOW 20 MIN: CPT | Mod: PBBFAC | Performed by: NURSE PRACTITIONER

## 2024-12-18 PROCEDURE — 99999 PR PBB SHADOW E&M-EST. PATIENT-LVL III: CPT | Mod: PBBFAC,,, | Performed by: NURSE PRACTITIONER

## 2024-12-18 PROCEDURE — 99213 OFFICE O/P EST LOW 20 MIN: CPT | Mod: S$PBB,,, | Performed by: NURSE PRACTITIONER

## 2024-12-18 RX ORDER — MULTIVITAMIN
1 TABLET ORAL 2 TIMES DAILY
Qty: 180 TABLET | Refills: 3 | Status: SHIPPED | OUTPATIENT
Start: 2024-12-18

## 2024-12-18 RX ORDER — LEVOTHYROXINE SODIUM 50 UG/1
50 TABLET ORAL
Qty: 90 TABLET | Refills: 1 | Status: SHIPPED | OUTPATIENT
Start: 2024-12-18

## 2024-12-18 NOTE — PROGRESS NOTES
Subjective:       Patient ID: Edith Jackson is a 69 y.o. female.    Chief Complaint:   History of Present Illness    CHIEF COMPLAINT:  Patient presents today for follow-up regarding thyroid medication and fatigue.    FATIGUE AND ENERGY LEVELS:  She reports experiencing occasional fatigue, particularly after meals which affects her ability to drive. The fatigue occurs with both carbohydrate and protein consumption. She notes a significant decrease from her previously high energy levels. Coffee consumption helps with her energy levels.    MEDICATIONS:  She has been out of Synthroid (Levothyroxine). She takes Trazodone for sleep. She has successfully weaned off Prozac.    RESPIRATORY HISTORY:  She has a history of asthma and pneumonia from two years ago. She reports a previous COVID infection that caused significant fatigue, requiring rest after minimal exertion such as folding laundry, and breathing difficulties lasting approximately one month. Imaging showed hyperinflation in the lungs, manifesting as difficulty with exhalation.    MUSCULOSKELETAL HISTORY:  She has a history of hip replacement and femur fracture, which she attributes to underlying osteopenia that was present at the time of injury.    IRON STUDIES:  She reports elevated iron levels and high saturated iron with impaired iron metabolism.    SLEEP:  She has not been tested for sleep apnea.    [place] [address]  Past Medical History:   Diagnosis Date    Acquired hypothyroidism     Arthritis     Asthma     H/O: pneumonia     Hypertension     Hypotension, iatrogenic     post-op    Osteopenia     Post-menopausal bleeding 01/2023    Respiratory distress        Past Surgical History:   Procedure Laterality Date    AUGMENTATION OF BREAST Bilateral     removal    BREAST RECONSTRUCTION      COLONOSCOPY N/A 03/17/2022    Procedure: COLONOSCOPY;  Surgeon: Alessandra Springer MD;  Location: [facility];  Service: General;  Laterality: N/A;    EPIDURAL STEROID INJECTION  INTO CERVICAL SPINE N/A 01/13/2023    Procedure: Injection-steroid-epidural-cervical;  Surgeon: Larry Thomason MD;  Location: [facility];  Service: Pain Management;  Laterality: N/A;  C7-T1    HIP REPLACEMENT ARTHROPLASTY Right     HYSTEROSCOPY WITH DILATION AND CURETTAGE OF UTERUS N/A 1/30/2023    Procedure: HYSTEROSCOPY, WITH DILATION AND CURETTAGE OF UTERUS;possible myosure;  Surgeon: Carie Dwyer MD;  Location: [facility];  Service: OB/GYN;  Laterality: N/A;    INJECTION OF ANESTHETIC AGENT AROUND MEDIAL BRANCH NERVES INNERVATING CERVICAL FACET JOINT Right 5/26/2023    Procedure: Block-nerve-medial branch-cervical C3-C4-C5-C6;  Surgeon: Larry Thomason MD;  Location: [facility];  Service: Pain Management;  Laterality: Right;    INJECTION OF ANESTHETIC AGENT AROUND MEDIAL BRANCH NERVES INNERVATING CERVICAL FACET JOINT Right 6/21/2023    Procedure: Block-nerve-medial branch-cervical;  Surgeon: Larry Thomason MD;  Location: [facility];  Service: Pain Management;  Laterality: Right;  c3,4,5,6 Mbb #2    JOINT REPLACEMENT      RADIOFREQUENCY THERMAL COAGULATION OF MEDIAL BRANCH OF POSTERIOR RAMUS OF CERVICAL SPINAL NERVE Right 7/19/2023    Procedure: RADIOFREQUENCY THERMAL COAGULATION, NERVE, SPINAL, CERVICAL, POSTERIOR RAMUS, MEDIAL BRANCH;  Surgeon: Larry Thomason MD;  Location: [facility];  Service: Anesthesiology;  Laterality: Right;  C3,4,5,6 RFA    TRANSFORAMINAL EPIDURAL INJECTION OF STEROID Right 3/17/2023    Procedure: Injection,steroid,cervical,transforaminal,   Right C6-C7, C7-T1;  Surgeon: Larry Thomason MD;  Location: [facility];  Service: Pain Management;  Laterality: Right;        Social History     Socioeconomic History    Marital status:     Number of children: 2    Highest education level: Master's degree (e.g., MA, MS, [place], MEd, MSW, RUSS)   Occupational History    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   Tobacco Use    Smoking status: Never    Smokeless tobacco: Never   Substance and Sexual Activity    Alcohol use: Yes      Alcohol/week: 2.0 standard drinks of alcohol     Types: 2 Glasses of wine per week     Comment: socially    Drug use: Never    Sexual activity: Yes     Partners: Male     Social Drivers of Health     Financial Resource Strain: Low Risk  (9/17/2024)    Overall Financial Resource Strain (CARDIA)     Difficulty of Paying Living Expenses: Not hard at all   Food Insecurity: No Food Insecurity (9/17/2024)    Hunger Vital Sign     Worried About Running Out of Food in the Last Year: Never true     Ran Out of Food in the Last Year: Never true   Transportation Needs: Unknown (3/11/2022)    PRAPARE - Transportation     Lack of Transportation (Medical): Patient declined     Lack of Transportation (Non-Medical): Patient declined   Physical Activity: Unknown (9/17/2024)    Exercise Vital Sign     Days of Exercise per Week: Patient declined   Stress: Patient Declined (9/17/2024)    [employer] - Occupational Stress Questionnaire     Feeling of Stress : Patient declined   Housing Stability: Unknown (9/17/2024)    Housing Stability Vital Sign     Unable to Pay for Housing in the Last Year: No       Family History   Problem Relation Name Age of Onset    Heart disease Mother      Diabetes Mother      Stroke Mother      Heart attack Mother      Thyroid disease Mother      Heart attack Father  74    Thyroid disease Sister      Heart disease Brother          stents x2    Breast cancer Neg Hx      Ovarian cancer Neg Hx         Review of patient's allergies indicates:   Allergen Reactions    Percocet [oxycodone-acetaminophen] Other (See Comments)     Headaches    Penicillins Rash          Current Outpatient Medications:     albuterol (VENTOLIN HFA) 90 mcg/actuation inhaler, Inhale 2 puffs into the lungs every 6 (six) hours as needed for Wheezing or Shortness of Breath. Rescue, Disp: 18 g, Rfl: 1    amLODIPine (NORVASC) 2.5 MG tablet, , Disp: , Rfl:     aspirin (ECOTRIN) 81 MG EC tablet, Take 1 tablet (81 mg  total) by mouth once daily., Disp: 90 tablet, Rfl: 3    calcium-vitamin D 600 mg-10 mcg (400 unit) Tab, Take 1 tablet by mouth 2 (two) times a day., Disp: 180 tablet, Rfl: 3    levothyroxine (SYNTHROID) 50 MCG tablet, Take 1 tablet (50 mcg total) by mouth before breakfast., Disp: 90 tablet, Rfl: 1    metoprolol succinate (TOPROL-XL) 50 MG 24 hr tablet, Take 1 tablet (50 mg total) by mouth once daily., Disp: 90 tablet, Rfl: 1    prednisoLONE acetate (PRED FORTE) 1 % DrpS, Place 1 drop into both eyes 4 (four) times daily., Disp: 10 mL, Rfl: 1    rosuvastatin (CRESTOR) 20 MG tablet, Take 1 tablet (20 mg total) by mouth every evening., Disp: 90 tablet, Rfl: 0    traZODone (DESYREL) 50 MG tablet, Take 1 tablet (50 mg total) by mouth every evening., Disp: 90 tablet, Rfl: 2    vitamin D (VITAMIN D3) 1000 units Tab, Take 1,000 Units by mouth once daily., Disp: , Rfl:     Thyroid Problem  Symptoms include fatigue.   Follow-up  Associated symptoms include fatigue.     Review of Systems   Constitutional:  Positive for fatigue.   HENT: Negative.     Eyes: Negative.    Respiratory: Negative.     Cardiovascular: Negative.    Gastrointestinal: Negative.    Endocrine: Negative.    Genitourinary: Negative.    Musculoskeletal: Negative.    Skin: Negative.    Allergic/Immunologic: Negative.    Neurological: Negative.    Hematological: Negative.    Psychiatric/Behavioral: Negative.               Objective:      Physical Exam  Vitals and nursing note reviewed.   Constitutional:       General: She is not in acute distress.     Appearance: Normal appearance. She is well-developed and normal weight. She is not ill-appearing.   HENT:      Head: Normocephalic.   Eyes:      Conjunctiva/sclera: Conjunctivae normal.   Neck:      Thyroid: No thyromegaly.   Cardiovascular:      Rate and Rhythm: Normal rate and regular rhythm.      Heart sounds: Normal heart sounds. No murmur heard.  Pulmonary:      Effort: Pulmonary effort is normal.      Breath  sounds: Normal breath sounds. No wheezing or rales.   Musculoskeletal:         General: Normal range of motion.      Cervical back: Normal range of motion.      Right lower leg: No edema.      Left lower leg: No edema.   Skin:     General: Skin is warm and dry.   Neurological:      Mental Status: She is alert and oriented to person, place, and time. Mental status is at baseline.   Psychiatric:         Mood and Affect: Mood normal.         Behavior: Behavior normal.         Thought Content: Thought content normal.         Judgment: Judgment normal.         Assessment:      1. Acquired hypothyroidism    2. Osteopenia of multiple sites    3. Encounter for osteoporosis screening in asymptomatic postmenopausal patient    4. Iron overload    5. Vitamin B 12 deficiency    6. Chronic fatigue        Plan:    1- Dexa after 1/26/25  2- fasting wellness labs in 3 mo then see NP  3- mammo in March      Acquired hypothyroidism  -     levothyroxine (SYNTHROID) 50 MCG tablet; Take 1 tablet (50 mcg total) by mouth before breakfast.  Dispense: 90 tablet; Refill: 1  -     T3; Future; Expected date: 12/18/2024    Osteopenia of multiple sites  -     DEXA Bone Density Axial Skeleton 1 or more Site W TBS XPD; Future; Expected date: 01/27/2025  -     calcium-vitamin D 600 mg-10 mcg (400 unit) Tab; Take 1 tablet by mouth 2 (two) times a day.  Dispense: 180 tablet; Refill: 3    Encounter for osteoporosis screening in asymptomatic postmenopausal patient  -     DEXA Bone Density Axial Skeleton 1 or more Site W TBS XPD; Future; Expected date: 01/27/2025    Iron overload  -     Ferritin; Future; Expected date: 12/19/2024  -     Iron and TIBC; Future; Expected date: 12/18/2024    Vitamin B 12 deficiency  -     Vitamin B12; Future; Expected date: 12/18/2024  -     Folate; Future; Expected date: 12/18/2024    Chronic fatigue        Risks, benefits, and side effects were discussed with the patient. All questions were answered to the fullest  satisfaction of the patient, and pt verbalized understanding and agreement to treatment plan. Pt was to call with any new or worsening symptoms, or present to the ER.        This note was generated with the assistance of ambient listening technology. Verbal consent was obtained by the patient and accompanying visitor(s) for the recording of patient appointment to facilitate this note. I attest to having reviewed and edited the generated note for accuracy, though some syntax or spelling errors may persist. Please contact the author of this note for any clarification.

## 2025-01-05 ENCOUNTER — OFFICE VISIT (OUTPATIENT)
Dept: URGENT CARE | Facility: CLINIC | Age: 70
End: 2025-01-05
Payer: MEDICARE

## 2025-01-05 VITALS
TEMPERATURE: 98 F | BODY MASS INDEX: 20.83 KG/M2 | HEART RATE: 64 BPM | WEIGHT: 125 LBS | HEIGHT: 65 IN | OXYGEN SATURATION: 99 % | DIASTOLIC BLOOD PRESSURE: 80 MMHG | RESPIRATION RATE: 18 BRPM | SYSTOLIC BLOOD PRESSURE: 145 MMHG

## 2025-01-05 DIAGNOSIS — N39.0 URINARY TRACT INFECTION WITH HEMATURIA, SITE UNSPECIFIED: Primary | ICD-10-CM

## 2025-01-05 DIAGNOSIS — R31.9 URINARY TRACT INFECTION WITH HEMATURIA, SITE UNSPECIFIED: Primary | ICD-10-CM

## 2025-01-05 DIAGNOSIS — R30.0 DYSURIA: ICD-10-CM

## 2025-01-05 LAB
BILIRUBIN, UA POC OHS: ABNORMAL
BLOOD, UA POC OHS: ABNORMAL
CLARITY, UA POC OHS: ABNORMAL
COLOR, UA POC OHS: ABNORMAL
GLUCOSE, UA POC OHS: NEGATIVE
KETONES, UA POC OHS: >=160
LEUKOCYTES, UA POC OHS: ABNORMAL
NITRITE, UA POC OHS: POSITIVE
PH, UA POC OHS: 6.5
PROTEIN, UA POC OHS: >=300
SPECIFIC GRAVITY, UA POC OHS: 1.02
UROBILINOGEN, UA POC OHS: 1

## 2025-01-05 RX ORDER — LEVOFLOXACIN 500 MG/1
500 TABLET, FILM COATED ORAL
COMMUNITY
Start: 2024-10-25 | End: 2025-01-05

## 2025-01-05 RX ORDER — PHENAZOPYRIDINE HYDROCHLORIDE 100 MG/1
100 TABLET, FILM COATED ORAL 3 TIMES DAILY PRN
Qty: 6 TABLET | Refills: 0 | Status: SHIPPED | OUTPATIENT
Start: 2025-01-05 | End: 2025-01-07

## 2025-01-05 RX ORDER — NITROFURANTOIN 25; 75 MG/1; MG/1
100 CAPSULE ORAL 2 TIMES DAILY
Qty: 14 CAPSULE | Refills: 0 | Status: SHIPPED | OUTPATIENT
Start: 2025-01-05 | End: 2025-01-12

## 2025-01-05 NOTE — PROGRESS NOTES
"Subjective:      Patient ID: Edith Jackson is a 69 y.o. female.    Vitals:  height is 5' 4.5" (1.638 m) and weight is 56.7 kg (125 lb). Her oral temperature is 97.9 °F (36.6 °C). Her blood pressure is 145/80 (abnormal) and her pulse is 64. Her respiration is 18 and oxygen saturation is 99%.     Chief Complaint: Urinary Tract Infection (Symptoms started 1 day ago. Symptoms are the following: back pain (resolved), frequency, urgency, bladder pressure. Symptoms treated with the following: azo just took after the test)    This is a 69 y.o. female who presents today with a chief complaint of Urinary Tract Infection: Symptoms started 1 day ago. Symptoms are the following: back pain (resolved), frequency, urgency, bladder pressure. Symptoms treated with the following: azo just took after the test     Patient presents with:  Urinary Tract Infection: Symptoms started 1 day ago. Symptoms are the following: back pain (resolved), frequency, urgency, bladder pressure. Symptoms treated with the following: azo just took after the test         Urinary Tract Infection   This is a new problem. The current episode started yesterday. The problem occurs every urination. The problem has been gradually worsening. The quality of the pain is described as aching. The pain is at a severity of 0/10. The patient is experiencing no pain. There has been no fever. Associated symptoms include frequency and urgency. Associated symptoms comments: Bladder pressure. Treatments tried: azo. The treatment provided no relief. Her past medical history is significant for hypertension and recurrent UTIs.       Constitution: Negative.   HENT: Negative.     Neck: neck negative.   Cardiovascular: Negative.    Eyes: Negative.    Respiratory: Negative.     Gastrointestinal: Negative.    Endocrine: negative.   Genitourinary:  Positive for dysuria, frequency and urgency.   Musculoskeletal: Negative.    Skin: Negative.    Allergic/Immunologic: Negative.  "   Neurological: Negative.    Hematologic/Lymphatic: Negative.    Psychiatric/Behavioral: Negative.        Objective:     Physical Exam   Constitutional: She is oriented to person, place, and time.   HENT:   Head: Normocephalic and atraumatic.   Ears:   Right Ear: External ear normal.   Left Ear: External ear normal.   Nose: Nose normal.   Eyes: Conjunctivae are normal. Pupils are equal, round, and reactive to light. Extraocular movement intact   Neck: Neck supple.   Cardiovascular: Normal rate and normal pulses.   Pulmonary/Chest: Effort normal.   Abdominal: Normal appearance and bowel sounds are normal. She exhibits no distension. Soft. There is no abdominal tenderness.   Musculoskeletal: Normal range of motion.         General: Normal range of motion.   Neurological: no focal deficit. She is alert, oriented to person, place, and time and at baseline.   Skin: Skin is warm.   Psychiatric: Her behavior is normal. Mood, judgment and thought content normal.   Nursing note and vitals reviewed.      Assessment:     1. Urinary tract infection with hematuria, site unspecified    2. Dysuria        Plan:       Urinary tract infection with hematuria, site unspecified  -     nitrofurantoin, macrocrystal-monohydrate, (MACROBID) 100 MG capsule; Take 1 capsule (100 mg total) by mouth 2 (two) times daily. for 7 days  Dispense: 14 capsule; Refill: 0  -     phenazopyridine (PYRIDIUM) 100 MG tablet; Take 1 tablet (100 mg total) by mouth 3 (three) times daily as needed for Pain.  Dispense: 6 tablet; Refill: 0    Dysuria  -     POCT Urinalysis(Instrument)  -     phenazopyridine (PYRIDIUM) 100 MG tablet; Take 1 tablet (100 mg total) by mouth 3 (three) times daily as needed for Pain.  Dispense: 6 tablet; Refill: 0

## 2025-01-20 DIAGNOSIS — I70.0 AORTIC ATHEROSCLEROSIS: ICD-10-CM

## 2025-01-22 RX ORDER — ROSUVASTATIN CALCIUM 20 MG/1
20 TABLET, COATED ORAL NIGHTLY
Qty: 90 TABLET | Refills: 0 | Status: SHIPPED | OUTPATIENT
Start: 2025-01-22

## 2025-01-22 NOTE — TELEPHONE ENCOUNTER
Refill Routing Note   Medication(s) are not appropriate for processing by [facility] for the following reason(s):        Non-participating provider    [facility] action(s):  Route               Appointments  past 12m or future 3m with PCP    Date Provider   Last Visit   12/18/2024 Yasemin Colbert NP   Next Visit   1/20/2025 Yasemin Colbert NP   ED visits in past 90 days: 0        Note composed:10:16 AM 01/22/2025

## 2025-01-23 ENCOUNTER — TELEPHONE (OUTPATIENT)
Dept: FAMILY MEDICINE | Facility: CLINIC | Age: 70
End: 2025-01-23
Payer: MEDICARE

## 2025-01-24 ENCOUNTER — LAB VISIT (OUTPATIENT)
Dept: LAB | Facility: HOSPITAL | Age: 70
End: 2025-01-24
Attending: FAMILY MEDICINE
Payer: MEDICARE

## 2025-01-24 ENCOUNTER — OFFICE VISIT (OUTPATIENT)
Dept: FAMILY MEDICINE | Facility: CLINIC | Age: 70
End: 2025-01-24
Payer: MEDICARE

## 2025-01-24 VITALS
HEART RATE: 69 BPM | WEIGHT: 125.81 LBS | DIASTOLIC BLOOD PRESSURE: 78 MMHG | BODY MASS INDEX: 21.48 KG/M2 | OXYGEN SATURATION: 99 % | SYSTOLIC BLOOD PRESSURE: 126 MMHG | HEIGHT: 64 IN | RESPIRATION RATE: 15 BRPM

## 2025-01-24 DIAGNOSIS — R10.9 FLANK PAIN: ICD-10-CM

## 2025-01-24 DIAGNOSIS — M54.9 UPPER BACK PAIN ON LEFT SIDE: Primary | ICD-10-CM

## 2025-01-24 DIAGNOSIS — R14.0 BLOATING: ICD-10-CM

## 2025-01-24 LAB
BILIRUBIN, UA POC OHS: NEGATIVE
BLOOD, UA POC OHS: ABNORMAL
CLARITY, UA POC OHS: CLEAR
COLOR, UA POC OHS: YELLOW
GLUCOSE, UA POC OHS: NEGATIVE
KETONES, UA POC OHS: NEGATIVE
LEUKOCYTES, UA POC OHS: ABNORMAL
NITRITE, UA POC OHS: NEGATIVE
PH, UA POC OHS: 7
PROTEIN, UA POC OHS: NEGATIVE
SPECIFIC GRAVITY, UA POC OHS: 1.02
UROBILINOGEN, UA POC OHS: 0.2

## 2025-01-24 PROCEDURE — 99214 OFFICE O/P EST MOD 30 MIN: CPT | Mod: PBBFAC | Performed by: FAMILY MEDICINE

## 2025-01-24 PROCEDURE — 36415 COLL VENOUS BLD VENIPUNCTURE: CPT | Performed by: FAMILY MEDICINE

## 2025-01-24 PROCEDURE — 81003 URINALYSIS AUTO W/O SCOPE: CPT | Mod: PBBFAC | Performed by: FAMILY MEDICINE

## 2025-01-24 PROCEDURE — 99214 OFFICE O/P EST MOD 30 MIN: CPT | Mod: S$PBB,,, | Performed by: FAMILY MEDICINE

## 2025-01-24 PROCEDURE — 86677 HELICOBACTER PYLORI ANTIBODY: CPT | Performed by: FAMILY MEDICINE

## 2025-01-24 PROCEDURE — 99999 PR PBB SHADOW E&M-EST. PATIENT-LVL IV: CPT | Mod: PBBFAC,,, | Performed by: FAMILY MEDICINE

## 2025-01-24 PROCEDURE — 99999PBSHW POCT URINALYSIS(INSTRUMENT): Mod: PBBFAC,,,

## 2025-01-24 NOTE — PROGRESS NOTES
Patient ID: Edith Jackson is a 69 y.o. female.    Chief Complaint: Back Pain (Not from injury, when walking, approximately 3 weeks)    History of Present Illness    CHIEF COMPLAINT:  Edith presents today for evaluation of urinary tract symptoms and abdominal pain.    ABDOMINAL PAIN:  She reports severe, deep-seated, sharp, knife-like abdominal pain that has been recurring since last year. The pain is severe enough to require sitting down and prevents walking, even around the house. The pain is not localized to the back but is felt deep within the abdomen.    URINARY SYMPTOMS:  She reports increased urinary frequency and denies pain or other urinary symptoms. She has a history of urinary tract infection approximately three weeks ago that was treated with medications.    GASTROINTESTINAL:  She reports new onset bloating without prior history of similar symptoms. She denies reflux symptoms.      ROS:  ROS as indicated in HPI.         Physical Exam    General: No acute distress. Well-developed. Well-nourished.  Eyes: EOMI. Sclerae anicteric.  HENT: Normocephalic. Atraumatic. Nares patent. Moist oral mucosa.  Cardiovascular: Regular rate. Regular rhythm. No murmurs. No rubs. No gallops. Normal S1, S2.  Respiratory: Normal respiratory effort. Clear to auscultation bilaterally. No rales. No rhonchi. No wheezing.  Musculoskeletal: No  obvious deformity.  Extremities: No lower extremity edema.  Neurological: Alert & oriented x3. No slurred speech. Normal gait.  Psychiatric: Normal mood. Normal affect. Good insight. Good judgment.  Skin: Warm. Dry. No rash.         Assessment & Plan    IMPRESSION:  - Suspected kidney stone based on patient's symptoms and presence of blood in urine  - Considered possibility of urinary tract infection due to leukocytes in urine, but trace amount could be external contamination  - Considering additional causes for reported bloating, such as gallbladder issues, reflux, or H.  pylori    SUSPECTED KIDNEY STONES:  - Ordered complete abdominal ultrasound to evaluate for kidney stones.  - Edith reports sharp, grinding pain that has been intermittent since last year, now worsened to the point of limiting mobility.  - Noted presence of hematuria, which can indicate a moving stone.  - Suspect kidney stone based on patient's symptoms and urinalysis results.  - Pain is localized in the lower back/abdominal area, consistent with kidney stone location.  - Follow up after ultrasound results are available to determine treatment plan for kidney stones.  - Defer treatment pending ultrasound results.    POSSIBLE URINARY TRACT INFECTION:  - Edith had a urinary tract infection about 3 weeks ago and completed medication.  - Current urinalysis shows trace leukocytes, which could be external contamination.  - Noted presence of leukocytes and hematuria, indicating a possible urinary tract infection.  - Edith reports increased urinary frequency.  - Confirmed patient is experiencing urinary frequency without pain.    ABDOMINAL PAIN:  - Edith reports sharp, deep pain in the lower abdominal area that worsens with movement.  - Assessed the location and nature of the pain, noting it's deep in the lower abdominal area.  - Ordered complete abdominal ultrasound to assess other abdominal organs including gallbladder.  - Defer treatment pending ultrasound results to determine appropriate intervention.    BLOATING:  - Edith reports experiencing bloating, which is a new symptom.  - Ordered H. pylori test to investigate cause of bloating.  - Considered various potential causes for bloating, including gallbladder issues, reflux, and H. pylori.  - Plan to check for reflux and other potential causes of bloating.  - Defer treatment pending ultrasound results to determine appropriate intervention.    FOLLOW UP:  - Results will be available in patient portal.  - Will ensure PCP Cristina receives a copy of ultrasound  results.         Plan:          Upper back pain on left side  -     POCT Urinalysis(Instrument)  -     US Abdomen Complete; Future; Expected date: 01/24/2025    Flank pain  -     POCT Urinalysis(Instrument)  -     US Abdomen Complete; Future; Expected date: 01/24/2025    Bloating  -     H.Pylori Antibody IgG; Future; Expected date: 01/24/2025        Follow up if symptoms worsen or fail to improve.  Keep appointments scheduled with PCP.  After ultrasound results, further evaluation maybe needed    This note was generated with the assistance of ambient listening technology. Verbal consent was obtained by the patient and accompanying visitor(s) for the recording of patient appointment to facilitate this note. I attest to having reviewed and edited the generated note for accuracy, though some syntax or spelling errors may persist. Please contact the author of this note for any clarification.

## 2025-01-25 LAB — H PYLORI IGG SERPL QL IA: NEGATIVE

## 2025-01-27 ENCOUNTER — HOSPITAL ENCOUNTER (OUTPATIENT)
Dept: RADIOLOGY | Facility: HOSPITAL | Age: 70
Discharge: HOME | End: 2025-01-27
Attending: NURSE PRACTITIONER
Payer: MEDICARE

## 2025-01-27 DIAGNOSIS — M85.89 OSTEOPENIA OF MULTIPLE SITES: ICD-10-CM

## 2025-01-27 DIAGNOSIS — Z78.0 ENCOUNTER FOR OSTEOPOROSIS SCREENING IN ASYMPTOMATIC POSTMENOPAUSAL PATIENT: ICD-10-CM

## 2025-01-27 DIAGNOSIS — Z13.820 ENCOUNTER FOR OSTEOPOROSIS SCREENING IN ASYMPTOMATIC POSTMENOPAUSAL PATIENT: ICD-10-CM

## 2025-01-27 PROCEDURE — 77080 DXA BONE DENSITY AXIAL: CPT | Mod: TC

## 2025-01-27 PROCEDURE — 77092 TBS I&R FX RSK QHP: CPT | Mod: ICN,,, | Performed by: RADIOLOGY

## 2025-01-27 PROCEDURE — 77080 DXA BONE DENSITY AXIAL: CPT | Mod: 26,ICN,, | Performed by: RADIOLOGY

## 2025-01-28 ENCOUNTER — HOSPITAL ENCOUNTER (OUTPATIENT)
Dept: RADIOLOGY | Facility: HOSPITAL | Age: 70
Discharge: HOME | End: 2025-01-28
Attending: FAMILY MEDICINE
Payer: MEDICARE

## 2025-01-28 ENCOUNTER — PATIENT MESSAGE (OUTPATIENT)
Dept: FAMILY MEDICINE | Facility: CLINIC | Age: 70
End: 2025-01-28
Payer: MEDICARE

## 2025-01-28 DIAGNOSIS — M54.9 UPPER BACK PAIN ON LEFT SIDE: ICD-10-CM

## 2025-01-28 DIAGNOSIS — R10.9 FLANK PAIN: ICD-10-CM

## 2025-01-28 PROCEDURE — 76700 US EXAM ABDOM COMPLETE: CPT | Mod: 26,,, | Performed by: RADIOLOGY

## 2025-01-28 PROCEDURE — 76700 US EXAM ABDOM COMPLETE: CPT | Mod: TC

## 2025-01-30 ENCOUNTER — OFFICE VISIT (OUTPATIENT)
Dept: FAMILY MEDICINE | Facility: CLINIC | Age: 70
End: 2025-01-30
Payer: MEDICARE

## 2025-01-30 VITALS
HEIGHT: 64 IN | WEIGHT: 127.19 LBS | OXYGEN SATURATION: 99 % | BODY MASS INDEX: 21.71 KG/M2 | SYSTOLIC BLOOD PRESSURE: 130 MMHG | DIASTOLIC BLOOD PRESSURE: 84 MMHG | HEART RATE: 75 BPM

## 2025-01-30 DIAGNOSIS — R10.12 LEFT UPPER QUADRANT ABDOMINAL PAIN: Primary | ICD-10-CM

## 2025-01-30 PROCEDURE — 99999 PR PBB SHADOW E&M-EST. PATIENT-LVL III: CPT | Mod: PBBFAC,,, | Performed by: FAMILY MEDICINE

## 2025-01-30 PROCEDURE — 99214 OFFICE O/P EST MOD 30 MIN: CPT | Mod: S$PBB,,, | Performed by: FAMILY MEDICINE

## 2025-01-30 PROCEDURE — G2211 COMPLEX E/M VISIT ADD ON: HCPCS | Mod: S$PBB,,, | Performed by: FAMILY MEDICINE

## 2025-01-30 PROCEDURE — 99213 OFFICE O/P EST LOW 20 MIN: CPT | Mod: PBBFAC | Performed by: FAMILY MEDICINE

## 2025-01-30 RX ORDER — TIZANIDINE 4 MG/1
4 TABLET ORAL EVERY 8 HOURS PRN
Qty: 30 TABLET | Refills: 0 | Status: SHIPPED | OUTPATIENT
Start: 2025-01-30

## 2025-01-30 NOTE — PROGRESS NOTES
Patient ID: Edith Jackson is a 69 y.o. female.    Chief Complaint: Back Pain (Still having pain in upper back on left side from 1/24/25 visit)    History of Present Illness    CHIEF COMPLAINT:  Edith presents today for severe pain in the flank region.    HISTORY OF PRESENT ILLNESS:  She reports severe flank pain located behind the rib cage between the spine and ribs. The pain has progressively worsened over the last three weeks and is now occurring daily. She describes the pain as severe, feeling like a knife turning. She experiences associated dizziness when the pain occurs. The pain significantly impacts daily activities, requiring frequent rest periods and preventing exercise. She denies nausea or vomiting. She has attempted both heat and ice therapy without relief and uses Aleve and Ibuprofen 800mg for pain management.    IMAGING:  Recent ultrasound showed normal liver, portal vein, and biliary ducts. Gallbladder was normally distended without gallstones. Pancreas, kidneys, spleen, aorta, and IVC were all normal. No ascites was noted. Colonoscopy was performed approximately one year ago.      ROS:  ROS as indicated in HPI.         Physical Exam    General: No acute distress. Well-developed. Well-nourished.  Eyes: EOMI. Sclerae anicteric.  HENT: Normocephalic. Atraumatic. Nares patent. Moist oral mucosa.  Cardiovascular: Regular rate. Regular rhythm. No murmurs. No rubs. No gallops. Normal S1, S2.  Respiratory: Normal respiratory effort. Clear to auscultation bilaterally. No rales. No rhonchi. No wheezing.  Abdominal:  Left-sided CVA tenderness  Musculoskeletal: No  obvious deformity.  Extremities: No lower extremity edema.  Neurological: Alert & oriented x3. No slurred speech. Normal gait.  Psychiatric: Normal mood. Normal affect. Good insight. Good judgment.  Skin: Warm. Dry. No rash.         Assessment & Plan    IMPRESSION:  - Considering kidney stone as primary differential diagnosis based on  patient's description of severe,  - Ordered CT abdomen and pelvis WO Contrast to further investigate the cause of pain, as this imaging modality is more sensitive for detecting kidney stones  - Prescribed muscle relaxers as a temporary measure to help with pain management and sleep, though suspecting the pain is more likely due to a kidney stone than muscle spasm    ABDOMINAL PAIN:  - Assessed the patient's severe pain in the right upper quadrant, behind the rib cage, between the spine and rib cage, which has been ongoing and progressively worsening over the last 3 weeks.  - Noted that the patient describes the pain as severe, like a knife turning, causing diaphoresis and inability to function.  - Reviewed previous ultrasound results showing normal findings for liver, portal vein, gallbladder, pancreas, kidneys, spleen, aorta, and IVC, with no gallstones or ascites detected.  - Considered the possibility of a kidney stone that might have been missed on ultrasound.  - Explained that kidney stones can sometimes be undetected on ultrasound but may be visible on CT.  - Discussed that contrast is not used for CT in suspected kidney stones as it can obscure the stone's visibility.  - Ordered CT abdomen and pelvis WO Contrast for further investigation.  - Prescribed muscle relaxers for temporary pain relief.  - Noted that the patient reports experiencing dizziness associated with the pain.  - Prescribed muscle relaxers for nighttime use as a sleep aid and pain management.  - Acknowledged the patient's report of inability to function or complete daily activities due to pain and fatigue.    WEIGHT MANAGEMENT:  - Noted the patient's report of weight gain due to inability to exercise because of pain.    FOLLOW UP:  - Instructed the patient to follow up as needed based on CT results.  - Advised the patient to contact the office if CT results indicate need for specialist referral (e.g., gastroenterologist).         Plan:           Left upper quadrant abdominal pain  -     CT Abdomen Pelvis  Without Contrast; Future; Expected date: 01/30/2025  -     tiZANidine (ZANAFLEX) 4 MG tablet; Take 1 tablet (4 mg total) by mouth every 8 (eight) hours as needed (muscle spasm).  Dispense: 30 tablet; Refill: 0        Follow up if symptoms worsen or fail to improve.    This note was generated with the assistance of ambient listening technology. Verbal consent was obtained by the patient and accompanying visitor(s) for the recording of patient appointment to facilitate this note. I attest to having reviewed and edited the generated note for accuracy, though some syntax or spelling errors may persist. Please contact the author of this note for any clarification.

## 2025-01-31 ENCOUNTER — PATIENT MESSAGE (OUTPATIENT)
Dept: FAMILY MEDICINE | Facility: CLINIC | Age: 70
End: 2025-01-31
Payer: MEDICARE

## 2025-01-31 ENCOUNTER — HOSPITAL ENCOUNTER (OUTPATIENT)
Dept: RADIOLOGY | Facility: HOSPITAL | Age: 70
Discharge: HOME | End: 2025-01-31
Attending: FAMILY MEDICINE
Payer: MEDICARE

## 2025-01-31 DIAGNOSIS — R10.32 LEFT LOWER QUADRANT PAIN: Primary | ICD-10-CM

## 2025-01-31 DIAGNOSIS — R10.9 FLANK PAIN: ICD-10-CM

## 2025-01-31 DIAGNOSIS — R10.12 LEFT UPPER QUADRANT ABDOMINAL PAIN: ICD-10-CM

## 2025-01-31 PROCEDURE — 74176 CT ABD & PELVIS W/O CONTRAST: CPT | Mod: 26,,, | Performed by: RADIOLOGY

## 2025-01-31 PROCEDURE — 74176 CT ABD & PELVIS W/O CONTRAST: CPT | Mod: TC

## 2025-02-05 ENCOUNTER — TELEPHONE (OUTPATIENT)
Dept: FAMILY MEDICINE | Facility: CLINIC | Age: 70
End: 2025-02-05
Payer: MEDICARE

## 2025-02-05 DIAGNOSIS — R10.12 LEFT UPPER QUADRANT ABDOMINAL PAIN: Primary | ICD-10-CM

## 2025-02-05 NOTE — TELEPHONE ENCOUNTER
----- Message from Nurse Merida sent at 2/5/2025 11:59 AM CST -----  Contact: self    ----- Message -----  From: Ladan Sampson  Sent: 2/5/2025  11:48 AM CST  To: [facility] Staff    Type:  Needs Medical Advice    Who Called: self  Symptoms (please be specific): pt is needing to speak to nurse regarding a test or radiology appt that needs to be scheduled. Please call pt.    Would the patient rather a call back or a response via [facility]? call  Best Call Back Number: [phone] (home)     Additional Information: please advise and thank you

## 2025-02-05 NOTE — TELEPHONE ENCOUNTER
Spoke with pt.  New CT required with contrast, yet MD signed ordered without contrast. Please advise

## 2025-02-07 ENCOUNTER — HOSPITAL ENCOUNTER (OUTPATIENT)
Dept: RADIOLOGY | Facility: HOSPITAL | Age: 70
Discharge: HOME | End: 2025-02-07
Attending: FAMILY MEDICINE
Payer: MEDICARE

## 2025-02-07 DIAGNOSIS — R10.12 LEFT UPPER QUADRANT ABDOMINAL PAIN: ICD-10-CM

## 2025-02-07 PROCEDURE — 74177 CT ABD & PELVIS W/CONTRAST: CPT | Mod: 26,,, | Performed by: RADIOLOGY

## 2025-02-07 PROCEDURE — 25500020 PHARM REV CODE 255: Performed by: FAMILY MEDICINE

## 2025-02-07 PROCEDURE — 74177 CT ABD & PELVIS W/CONTRAST: CPT | Mod: TC

## 2025-02-07 RX ADMIN — IOHEXOL 30 ML: 350 INJECTION, SOLUTION INTRAVENOUS at 08:02

## 2025-02-07 RX ADMIN — IOHEXOL 75 ML: 350 INJECTION, SOLUTION INTRAVENOUS at 08:02

## 2025-02-12 ENCOUNTER — PATIENT MESSAGE (OUTPATIENT)
Dept: FAMILY MEDICINE | Facility: CLINIC | Age: 70
End: 2025-02-12
Payer: MEDICARE

## 2025-02-12 DIAGNOSIS — R10.9 FLANK PAIN: Primary | ICD-10-CM

## 2025-02-18 ENCOUNTER — CLINICAL SUPPORT (OUTPATIENT)
Dept: REHABILITATION | Facility: HOSPITAL | Age: 70
End: 2025-02-18
Attending: FAMILY MEDICINE
Payer: MEDICARE

## 2025-02-18 DIAGNOSIS — Z74.09 IMPAIRED MOBILITY: Primary | ICD-10-CM

## 2025-02-18 DIAGNOSIS — R10.9 FLANK PAIN: ICD-10-CM

## 2025-02-18 PROCEDURE — 97161 PT EVAL LOW COMPLEX 20 MIN: CPT | Mod: PN
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
## 2025-02-20 ENCOUNTER — CLINICAL SUPPORT (OUTPATIENT)
Dept: REHABILITATION | Facility: HOSPITAL | Age: 70
End: 2025-02-20
Payer: MEDICARE

## 2025-02-20 DIAGNOSIS — Z74.09 IMPAIRED MOBILITY: Primary | ICD-10-CM

## 2025-02-20 PROCEDURE — 97530 THERAPEUTIC ACTIVITIES: CPT | Mod: PN

## 2025-02-20 PROCEDURE — 97110 THERAPEUTIC EXERCISES: CPT | Mod: PN

## 2025-02-20 NOTE — PROGRESS NOTES
Outpatient Rehab    Physical Therapy Evaluation (only)    Patient Name: Edith Jackson  MRN: [id number]  YOB: 1955  Today's Date: 2/20/2025    Therapy Diagnosis:   Encounter Diagnosis   Name Primary?    Impaired mobility Yes     Physician: Chandrika Varghese MD    Physician Orders: Eval and Treat  Medical Diagnosis: R10.9 (ICD-10-CM) - Flank pain     Visit # / Visits Authorized:  2/ 12   Date of Evaluation:  2/20/2025   Insurance Authorization Period: 2/18/25 to 12/31/25  Plan of Care Certification:  2/20/2025 to 5/18/25      Time In:   1332  Time Out:  1418  Total Time:   46  Total Billable Time: 46    Intake Outcome Measure for FOTO Survey    Therapist reviewed FOTO scores for Edith Jackson on 2/20/2025.   FOTO report - see Media section or FOTO account episode details.     Intake Score:  %         Subjective               Past Medical History/Physical Systems Review:   Edith Jackson  has a past medical history of Acquired hypothyroidism, Arthritis, Asthma, H/O: pneumonia, Hypertension, Hypotension, iatrogenic, Osteopenia, Post-menopausal bleeding, and Respiratory distress.    Edith Jackson  has a past surgical history that includes Hip replacement arthroplasty (Right); Breast reconstruction; Augmentation of breast (Bilateral); Colonoscopy (N/A, 03/17/2022); Epidural steroid injection into cervical spine (N/A, 01/13/2023); Joint replacement; Hysteroscopy with dilation and curettage of uterus (N/A, 1/30/2023); Transforaminal epidural injection of steroid (Right, 3/17/2023); Injection of anesthetic agent around medial branch nerves innervating cervical facet joint (Right, 5/26/2023); Injection of anesthetic agent around medial branch nerves innervating cervical facet joint (Right, 6/21/2023); and Radiofrequency thermal coagulation of medial branch of posterior ramus of cervical spinal nerve (Right, 7/19/2023).    Edith has a current medication list which  includes the following prescription(s): albuterol, amlodipine, aspirin, levothyroxine, metoprolol succinate, rosuvastatin, tizanidine, trazodone, and vitamin d.    Review of patient's allergies indicates:   Allergen Reactions    Percocet [oxycodone-acetaminophen] Other (See Comments)     Headaches    Penicillins Rash        Objective              Assessment & Plan    Did not produce pain while doing the exercises. Educated pt about condition/treatment.    Plan: Cont. POC      Patient's spiritual, cultural, and educational needs considered and patient agreeable to plan of care and goals.           Goals:   Active       Physical Therapy       Physical Therapy Goal       Start:  02/19/25    Expected End:  04/03/25       Goals:  Short Term Goals: 3 weeks   Pt will be compliant with HEP.  Pt will improve quad strength by 1/2 grade to allow for strength to go up and down stairs.   Pt will improve sit <>  30 sec to at least 10 allow for increased functional mobility.     Long Term Goals: 6 weeks   Pt will be independent with HEP to allow for increased functional tasks.  Pt will improve FOTO by 10 % to demonstrate improvement in quality of life.  Pt will improve hip flexor strength by 1/2 grade to allow for normalized body mechanics.   Pt will be able to tolerate gait a half of mile without stopping to improve daily activities.              Ginette Brown, PT

## 2025-02-24 ENCOUNTER — CLINICAL SUPPORT (OUTPATIENT)
Dept: REHABILITATION | Facility: HOSPITAL | Age: 70
End: 2025-02-24
Payer: MEDICARE

## 2025-02-24 DIAGNOSIS — Z74.09 IMPAIRED MOBILITY: Primary | ICD-10-CM

## 2025-02-24 PROCEDURE — 97530 THERAPEUTIC ACTIVITIES: CPT | Mod: PN,CQ

## 2025-02-24 PROCEDURE — 97110 THERAPEUTIC EXERCISES: CPT | Mod: PN,CQ

## 2025-02-24 NOTE — PROGRESS NOTES
Outpatient Rehab    Physical Therapy Visit    Patient Name: Edith Jackson  MRN: [id number]  YOB: 1955  Encounter Date: 2/24/2025    Therapy Diagnosis:   Encounter Diagnosis   Name Primary?    Impaired mobility Yes     Physician: Chandrika Varghese MD    Physician Orders: Eval and Treat  Medical Diagnosis: R10.9 (ICD-10-CM) - Flank pain      Visit # / Visits Authorized:  2 / 12   Date of Evaluation:  2/20/2025   Insurance Authorization Period: 2/18/25 to 12/31/25  Plan of Care Certification:  2/20/2025 to 5/20/25   Time In:   10:00 AM  Time Out:  11:00 AM  Total Time:   60 Minutes   Total Billable Time: 30 Minutes split billing    FOTO:  Intake Score:  %  Survey Score 1:  %  Survey Score 2:  %         Subjective   No new c/o's..  Pain reported as 0/10.      Objective            Treatment:  23 Minutes  Therapeutic Exercise  Therapeutic Exercise Activity 1: Scapular Retraction w/ BTB x 15  Therapeutic Exercise Activity 2: Wall push ups x 15  Therapeutic Exercise Activity 3: Standing UE extension w/ band (red) x 20  Therapeutic Exercise Activity 4: Seated side bending stretch; arm overhead 3 x 15 sec  Therapeutic Exercise Activity 5: Paloff press out; bilat. w/ band (green) x 15 ea  Therapeutic Exercise Activity 6: Seated lat pull downs w/ band ( green) x 20  Therapeutic Exercise Activity 7: Cable cross push/pull x 10 ea 7#    30 Minutes  Therapeutic Activity  Therapeutic Activity 1: Nu-step L-5 x 15 min; seat#8  Therapeutic Activity 2: Seated Fwd and side roll outs w/ SB; 2 sec  hold in ea direction x 3 min  Therapeutic Activity 3: Seated overhead press on Swiss ball ( focus on core stability) x 20 4#  Therapeutic Activity 4: Seated on SB UE raises 2 x 10 w/ 3#  Therapeutic Activity 5: Seated on SB diagonal lifts w/ rotation; holding ball x 10 ea    Assessment & Plan   Assessment: Patient did well with exercises; no exacerbations noted.       Patient will continue to benefit from skilled  outpatient physical therapy to address the deficits listed in the problem list box on initial evaluation, provide pt/family education and to maximize pt's level of independence in the home and community environment.     Patient's spiritual, cultural, and educational needs considered and patient agreeable to plan of care and goals.           Plan: Continue per POC and progress with strength/mobility exercises as patient tolerates    Goals:   Active       Physical Therapy       Physical Therapy Goal       Start:  02/19/25    Expected End:  04/03/25       Goals:  Short Term Goals: 3 weeks   Pt will be compliant with HEP.  Pt will improve quad strength by 1/2 grade to allow for strength to go up and down stairs.   Pt will improve sit <>  30 sec to at least 10 allow for increased functional mobility.     Long Term Goals: 6 weeks   Pt will be independent with HEP to allow for increased functional tasks.  Pt will improve FOTO by 10 % to demonstrate improvement in quality of life.  Pt will improve hip flexor strength by 1/2 grade to allow for normalized body mechanics.   Pt will be able to tolerate gait a half of mile without stopping to improve daily activities.              Jonathan Favre, PTA

## 2025-03-03 ENCOUNTER — TELEPHONE (OUTPATIENT)
Dept: FAMILY MEDICINE | Facility: CLINIC | Age: 70
End: 2025-03-03
Payer: MEDICARE

## 2025-03-05 ENCOUNTER — TELEPHONE (OUTPATIENT)
Dept: FAMILY MEDICINE | Facility: CLINIC | Age: 70
End: 2025-03-05
Payer: MEDICARE

## 2025-03-05 NOTE — TELEPHONE ENCOUNTER
----- Message from M[place]inley sent at 3/5/2025 11:22 AM CST -----  Regarding: Referral  Type:  Patient Requesting ReferralWho Called:pt Does the patient already have the specialty appointment scheduled?:noIf yes, what is the date of that appointment?:Referral to What Specialty:Lab Reason for Referral:blood work for appt 03/21Does the patient want the referral with a specific physician?:noIs the [facility] an [facility] Patient Requesting a Response?:yes Would the patient rather a call back or a response via [facility]? Call back Best Call Back Number:[phone] Additional Information:  pt would like to know if office received outside orders(frm [facility]) for labs to go with appt 03/21, please call to advise, Thank You.

## 2025-03-05 NOTE — TELEPHONE ENCOUNTER
Spoke with pt, advised we have not received orders from cardio, scheduled labs from NP. Advised pt to get orders from cardio and take to lab appointment to ensure nothing is missed from them, pt verbalized understanding.

## 2025-03-10 ENCOUNTER — CLINICAL SUPPORT (OUTPATIENT)
Dept: REHABILITATION | Facility: HOSPITAL | Age: 70
End: 2025-03-10
Payer: MEDICARE

## 2025-03-10 DIAGNOSIS — Z74.09 IMPAIRED MOBILITY: Primary | ICD-10-CM

## 2025-03-10 PROCEDURE — 97110 THERAPEUTIC EXERCISES: CPT | Mod: PN

## 2025-03-10 PROCEDURE — 97530 THERAPEUTIC ACTIVITIES: CPT | Mod: PN

## 2025-03-10 NOTE — PROGRESS NOTES
Outpatient Rehab    Physical Therapy Visit    Patient Name: Edith Jackson  MRN: [id number]  YOB: 1955  Encounter Date: 3/10/2025    Therapy Diagnosis:   Encounter Diagnosis   Name Primary?    Impaired mobility Yes     Physician: Chandrika Varghese MD    Physician Orders: Eval and Treat  Medical Diagnosis: R10.9 (ICD-10-CM) - Flank pain      Visit # / Visits Authorized:  3 / 12   Date of Evaluation:  2/20/2025   Insurance Authorization Period: 2/18/25 to 12/31/25  Plan of Care Certification:  2/20/2025 to 5/20/25      Time In: 1000   Time Out: 1052  Total Time: 52   Total Billable Time: 52    FOTO:  Intake Score:  %  Survey Score 1:  %  Survey Score 2:  %         Subjective   Pt reports she has been walking a few times a week and has not had pain. she feels she is doing really good at this time..  Pain reported as 0/10.      Objective            Treatment:  Therapeutic Exercise  Therapeutic Exercise Activity 1: Scapular Retraction w/ BTB x 15  Therapeutic Exercise Activity 2: Wall push ups x 15  Therapeutic Exercise Activity 3: Standing UE extension w/ band (red) x 20  Therapeutic Exercise Activity 4: Seated side bending stretch; arm overhead 3 x 15 sec  Therapeutic Exercise Activity 6: cable lat pull downs 7# x 10  Therapeutic Exercise Activity 7: Cable cross rows x 10 ea 7#    Therapeutic Activity  Therapeutic Activity 1: Nu-step L-5 x 15 min; seat#8  Therapeutic Activity 2: Seated Fwd and side roll outs w/ SB; 2 sec  hold in ea direction x 3 min  Therapeutic Activity 3: Seated overhead press on Swiss ball ( focus on core stability) x 20 4#  Therapeutic Activity 4: Seated on SB UE raises 2 x 10 w/ 4#  Therapeutic Activity 5: Seated on SB diagonal lifts w/ rotation; holding ball x 10 ea  Therapeutic Activity 6: seated on SB opp hand  opp leg lifts 10x    Assessment & Plan   Assessment: Pt tolerated treatment well. Pt was not at therapy for about 2 weeks due to vacation. Pt was able to keep up  with HEP and has been feeling good. She has improved lumbar ROM in a pain free motion at this time.       Patient will continue to benefit from skilled outpatient physical therapy to address the deficits listed in the problem list box on initial evaluation, provide pt/family education and to maximize pt's level of independence in the home and community environment.     Patient's spiritual, cultural, and educational needs considered and patient agreeable to plan of care and goals.           Plan:      Goals:   Active       Physical Therapy       Physical Therapy Goal       Start:  02/19/25    Expected End:  04/03/25       Goals:  Short Term Goals: 3 weeks   Pt will be compliant with HEP.  Pt will improve quad strength by 1/2 grade to allow for strength to go up and down stairs.   Pt will improve sit <>  30 sec to at least 10 allow for increased functional mobility.     Long Term Goals: 6 weeks   Pt will be independent with HEP to allow for increased functional tasks.  Pt will improve FOTO by 10 % to demonstrate improvement in quality of life.  Pt will improve hip flexor strength by 1/2 grade to allow for normalized body mechanics.   Pt will be able to tolerate gait a half of mile without stopping to improve daily activities.              Marry Harris, PT

## 2025-03-12 ENCOUNTER — CLINICAL SUPPORT (OUTPATIENT)
Dept: REHABILITATION | Facility: HOSPITAL | Age: 70
End: 2025-03-12
Payer: MEDICARE

## 2025-03-12 DIAGNOSIS — Z74.09 IMPAIRED MOBILITY: Primary | ICD-10-CM

## 2025-03-12 PROCEDURE — 97110 THERAPEUTIC EXERCISES: CPT | Mod: PN

## 2025-03-12 PROCEDURE — 97530 THERAPEUTIC ACTIVITIES: CPT | Mod: PN

## 2025-03-12 PROCEDURE — 97150 GROUP THERAPEUTIC PROCEDURES: CPT | Mod: PN

## 2025-03-12 NOTE — PROGRESS NOTES
Outpatient Rehab    Physical Therapy Visit    Patient Name: Edith Jackson  MRN: [id number]  YOB: 1955  Encounter Date: 3/12/2025    Therapy Diagnosis:   Encounter Diagnosis   Name Primary?    Impaired mobility Yes     Physician: Chandrika Varghese MD    Physician Orders: Eval and Treat  Medical Diagnosis: R10.9 (ICD-10-CM) - Flank pain      Visit # / Visits Authorized:  4 / 12   Date of Evaluation:  2/20/2025   Insurance Authorization Period: 2/18/25 to 12/31/25  Plan of Care Certification:  2/20/2025 to 5/20/25    Time In: 1000   Time Out: 1053  Total Time: 53   Total Billable Time: 38 split billing    FOTO:  Intake Score:  %  Survey Score 1:  %  Survey Score 2:  %         Subjective   She has been walking this morning and not having much pain today. Rates it as 1-2/10 and still on left side..         Objective            Treatment:  Therapeutic Exercise  Therapeutic Exercise Activity 1: Scapular Retraction w/ BTB x 20  Therapeutic Exercise Activity 2: Wall push ups x 15  Therapeutic Exercise Activity 3: Standing UE extension w/ band (red) x 20  Therapeutic Exercise Activity 4: Seated side bending stretch; arm overhead 3 x 15 sec  Therapeutic Exercise Activity 5: Paloff press out w/ band (green) 10 each  Therapeutic Exercise Activity 6: cable lat pull downs 7# x 15  Therapeutic Exercise Activity 7: Cable cross rows x 1 ea 7#    Therapeutic Activity  Therapeutic Activity 1: Nu-step L-5 x 15 min; seat#8  Therapeutic Activity 2: Seated Fwd and side roll outs w/ SB; 2 sec  hold in ea direction x 3 min  Therapeutic Activity 3: Seated overhead press on Swiss ball ( focus on core stability) x 20 4#  Therapeutic Activity 4: Seated on SB UE raises 2 x 10 w/ 4#  Therapeutic Activity 5: Seated on SB diagonal lifts w/ rotation; holding ball x 10 ea  Therapeutic Activity 6: seated on SB opp hand  opp leg lifts 10x  Therapeutic Activity 7: Seated SB rows w/ band (green) x 20  Therapeutic Activity 8:  Quadruped cat/cow x 10    Assessment & Plan   Assessment: Pt tolerated treatment well. Pt was able to keep up with HEP and has been feeling good. She has improved lumbar ROM in a pain free motion at this time.       Patient will continue to benefit from skilled outpatient physical therapy to address the deficits listed in the problem list box on initial evaluation, provide pt/family education and to maximize pt's level of independence in the home and community environment.     Patient's spiritual, cultural, and educational needs considered and patient agreeable to plan of care and goals.           Plan: Cont. POC    Goals:   Active       Physical Therapy       Physical Therapy Goal       Start:  02/19/25    Expected End:  04/03/25       Goals:  Short Term Goals: 3 weeks   Pt will be compliant with HEP.  Pt will improve quad strength by 1/2 grade to allow for strength to go up and down stairs.   Pt will improve sit <>  30 sec to at least 10 allow for increased functional mobility.     Long Term Goals: 6 weeks   Pt will be independent with HEP to allow for increased functional tasks.  Pt will improve FOTO by 10 % to demonstrate improvement in quality of life.  Pt will improve hip flexor strength by 1/2 grade to allow for normalized body mechanics.   Pt will be able to tolerate gait a half of mile without stopping to improve daily activities.              Ginette Brown, PT

## 2025-03-13 ENCOUNTER — LAB VISIT (OUTPATIENT)
Dept: LAB | Facility: HOSPITAL | Age: 70
End: 2025-03-13
Attending: NURSE PRACTITIONER
Payer: MEDICARE

## 2025-03-13 DIAGNOSIS — E78.2 MIXED HYPERLIPIDEMIA: ICD-10-CM

## 2025-03-13 DIAGNOSIS — R73.01 ELEVATED FASTING GLUCOSE: ICD-10-CM

## 2025-03-13 DIAGNOSIS — Z00.00 WELL ADULT EXAM: ICD-10-CM

## 2025-03-13 DIAGNOSIS — E83.19 IRON OVERLOAD: ICD-10-CM

## 2025-03-13 DIAGNOSIS — E53.8 VITAMIN B 12 DEFICIENCY: ICD-10-CM

## 2025-03-13 DIAGNOSIS — E55.9 VITAMIN D DEFICIENCY: ICD-10-CM

## 2025-03-13 DIAGNOSIS — E03.9 ACQUIRED HYPOTHYROIDISM: ICD-10-CM

## 2025-03-13 LAB
25(OH)D3+25(OH)D2 SERPL-MCNC: 37 NG/ML (ref 30–96)
ALBUMIN SERPL BCP-MCNC: 3.7 G/DL (ref 3.5–5.2)
ALP SERPL-CCNC: 55 U/L (ref 40–150)
ALT SERPL W/O P-5'-P-CCNC: 25 U/L (ref 10–44)
ANION GAP SERPL CALC-SCNC: 7 MMOL/L (ref 8–16)
AST SERPL-CCNC: 23 U/L (ref 10–40)
BASOPHILS # BLD AUTO: 0.06 K/UL (ref 0–0.2)
BASOPHILS NFR BLD: 1.2 % (ref 0–1.9)
BILIRUB SERPL-MCNC: 0.4 MG/DL (ref 0.1–1)
BUN SERPL-MCNC: 11 MG/DL (ref 8–23)
CALCIUM SERPL-MCNC: 9.2 MG/DL (ref 8.7–10.5)
CHLORIDE SERPL-SCNC: 100 MMOL/L (ref 95–110)
CHOLEST SERPL-MCNC: 184 MG/DL (ref 120–199)
CHOLEST/HDLC SERPL: 2.3 {RATIO} (ref 2–5)
CO2 SERPL-SCNC: 26 MMOL/L (ref 23–29)
CREAT SERPL-MCNC: 0.7 MG/DL (ref 0.5–1.4)
DIFFERENTIAL METHOD BLD: ABNORMAL
EOSINOPHIL # BLD AUTO: 0.4 K/UL (ref 0–0.5)
EOSINOPHIL NFR BLD: 7.3 % (ref 0–8)
ERYTHROCYTE [DISTWIDTH] IN BLOOD BY AUTOMATED COUNT: 13.7 % (ref 11.5–14.5)
EST. GFR  (NO RACE VARIABLE): >60 ML/MIN/1.73 M^2
ESTIMATED AVG GLUCOSE: 111 MG/DL (ref 68–131)
FERRITIN SERPL-MCNC: 105 NG/ML (ref 20–300)
FOLATE SERPL-MCNC: 17.6 NG/ML (ref 4–24)
GLUCOSE SERPL-MCNC: 99 MG/DL (ref 70–110)
HBA1C MFR BLD: 5.5 % (ref 4–5.6)
HCT VFR BLD AUTO: 39.6 % (ref 37–48.5)
HDLC SERPL-MCNC: 80 MG/DL (ref 40–75)
HDLC SERPL: 43.5 % (ref 20–50)
HGB BLD-MCNC: 13.5 G/DL (ref 12–16)
IMM GRANULOCYTES # BLD AUTO: 0.01 K/UL (ref 0–0.04)
IMM GRANULOCYTES NFR BLD AUTO: 0.2 % (ref 0–0.5)
IRON SERPL-MCNC: 71 UG/DL (ref 30–160)
LDLC SERPL CALC-MCNC: 92.8 MG/DL (ref 63–159)
LYMPHOCYTES # BLD AUTO: 3 K/UL (ref 1–4.8)
LYMPHOCYTES NFR BLD: 61.2 % (ref 18–48)
MCH RBC QN AUTO: 30.7 PG (ref 27–31)
MCHC RBC AUTO-ENTMCNC: 34.1 G/DL (ref 32–36)
MCV RBC AUTO: 90 FL (ref 82–98)
MONOCYTES # BLD AUTO: 0.5 K/UL (ref 0.3–1)
MONOCYTES NFR BLD: 9.7 % (ref 4–15)
NEUTROPHILS # BLD AUTO: 1 K/UL (ref 1.8–7.7)
NEUTROPHILS NFR BLD: 20.4 % (ref 38–73)
NONHDLC SERPL-MCNC: 104 MG/DL
NRBC BLD-RTO: 0 /100 WBC
PLATELET # BLD AUTO: 285 K/UL (ref 150–450)
PMV BLD AUTO: 8.6 FL (ref 9.2–12.9)
POTASSIUM SERPL-SCNC: 4 MMOL/L (ref 3.5–5.1)
PROT SERPL-MCNC: 7.2 G/DL (ref 6–8.4)
RBC # BLD AUTO: 4.4 M/UL (ref 4–5.4)
SATURATED IRON: 22 % (ref 20–50)
SODIUM SERPL-SCNC: 133 MMOL/L (ref 136–145)
T3 SERPL-MCNC: 63 NG/DL (ref 60–180)
T4 FREE SERPL-MCNC: 1.1 NG/DL (ref 0.71–1.51)
TOTAL IRON BINDING CAPACITY: 327 UG/DL (ref 250–450)
TRANSFERRIN SERPL-MCNC: 221 MG/DL (ref 200–375)
TRIGL SERPL-MCNC: 56 MG/DL (ref 30–150)
TSH SERPL DL<=0.005 MIU/L-ACNC: 3.66 UIU/ML (ref 0.4–4)
VIT B12 SERPL-MCNC: 613 PG/ML (ref 210–950)
WBC # BLD AUTO: 4.95 K/UL (ref 3.9–12.7)

## 2025-03-13 PROCEDURE — 82607 VITAMIN B-12: CPT | Performed by: NURSE PRACTITIONER

## 2025-03-13 PROCEDURE — 84466 ASSAY OF TRANSFERRIN: CPT | Performed by: NURSE PRACTITIONER

## 2025-03-13 PROCEDURE — 36415 COLL VENOUS BLD VENIPUNCTURE: CPT | Performed by: NURSE PRACTITIONER

## 2025-03-13 PROCEDURE — 85025 COMPLETE CBC W/AUTO DIFF WBC: CPT | Performed by: NURSE PRACTITIONER

## 2025-03-13 PROCEDURE — 82728 ASSAY OF FERRITIN: CPT | Performed by: NURSE PRACTITIONER

## 2025-03-13 PROCEDURE — 83036 HEMOGLOBIN GLYCOSYLATED A1C: CPT | Performed by: NURSE PRACTITIONER

## 2025-03-13 PROCEDURE — 80053 COMPREHEN METABOLIC PANEL: CPT | Performed by: NURSE PRACTITIONER

## 2025-03-13 PROCEDURE — 84439 ASSAY OF FREE THYROXINE: CPT | Performed by: NURSE PRACTITIONER

## 2025-03-13 PROCEDURE — 82746 ASSAY OF FOLIC ACID SERUM: CPT | Performed by: NURSE PRACTITIONER

## 2025-03-13 PROCEDURE — 84480 ASSAY TRIIODOTHYRONINE (T3): CPT | Performed by: NURSE PRACTITIONER

## 2025-03-13 PROCEDURE — 82306 VITAMIN D 25 HYDROXY: CPT | Performed by: NURSE PRACTITIONER

## 2025-03-13 PROCEDURE — 84443 ASSAY THYROID STIM HORMONE: CPT | Performed by: NURSE PRACTITIONER

## 2025-03-13 PROCEDURE — 80061 LIPID PANEL: CPT | Performed by: NURSE PRACTITIONER

## 2025-03-15 ENCOUNTER — RESULTS FOLLOW-UP (OUTPATIENT)
Dept: FAMILY MEDICINE | Facility: CLINIC | Age: 70
End: 2025-03-15
Payer: MEDICARE

## 2025-03-17 ENCOUNTER — CLINICAL SUPPORT (OUTPATIENT)
Dept: REHABILITATION | Facility: HOSPITAL | Age: 70
End: 2025-03-17
Payer: MEDICARE

## 2025-03-17 DIAGNOSIS — Z74.09 IMPAIRED MOBILITY: Primary | ICD-10-CM

## 2025-03-17 PROCEDURE — 97110 THERAPEUTIC EXERCISES: CPT | Mod: PN

## 2025-03-17 PROCEDURE — 97530 THERAPEUTIC ACTIVITIES: CPT | Mod: PN

## 2025-03-17 NOTE — PROGRESS NOTES
Outpatient Rehab    Physical Therapy Visit    Patient Name: Edith Jackson  MRN: [id number]  YOB: 1955  Encounter Date: 3/17/2025    Therapy Diagnosis:   Encounter Diagnosis   Name Primary?    Impaired mobility Yes     Physician: Chandrika Varghese MD    Physician Orders: Eval and Treat  Medical Diagnosis: Flank pain    Visit # / Visits Authorized:  5 / 10  Date of Evaluation: 2/20/25  Insurance Authorization Period: 2/19/2025 to 12/31/2025  Plan of Care Certification:  2/20/2025 to / 5/20/2025     PT/PTA:     Number of PTA visits since last PT visit:   Time In: 1055   Time Out: 1140  Total Time: 45   Total Billable Time: 45      FOTO:  Intake Score:  %  Survey Score 1:  %  Survey Score 2:  %         Subjective   She hurt her back yesterday picking up a heavy container but says she is able to do her therapy today. Her flank pain is not there today..         Objective            Treatment:  Therapeutic Exercise  TE 1: Scapular Retraction w/ BTB x 20  TE 2: Wall push ups x 15  TE 3: Standing UE extension w/ band (red) x 20  TE 4: Seated side bending stretch; L arm overhead 3 x 15 sec  TE 5: Paloff press out w/ band (green) 10 each  TE 6: cable lat pull downs 7# x 15  TE 7: Cable cross rows x 15 ea 7#  Therapeutic Activity  TA 1: Nu-step L-5 x 10 min; seat#8  TA 2: Seated Fwd and side roll outs w/ SB; 2 sec  hold in ea direction x 3 min  TA 3: Seated overhead press on Swiss ball ( focus on core stability) x 20 4#  TA 4: Seated on SB UE raises 2 x 10 w/ 4#  TA 5: Seated on SB diagonal lifts w/ rotation; holding ball x 10 ea  TA 6: seated on SB opp hand  opp leg lifts 10x  TA 7: Seated SB rows w/ band (green) x 20  TA 8: Quadruped cat/cow x 10  TA 9: Pulleys x 3 min (seated)    Time Entry(in minutes):  Therapeutic Activity Time Entry: 25  Therapeutic Exercise Time Entry: 20    Assessment & Plan   Assessment: No complaints of pain while doing the exercises. Back pain did not affect her performance  today.       Patient will continue to benefit from skilled outpatient physical therapy to address the deficits listed in the problem list box on initial evaluation, provide pt/family education and to maximize pt's level of independence in the home and community environment.     Patient's spiritual, cultural, and educational needs considered and patient agreeable to plan of care and goals.           Plan: Cont. POC    Goals:   Active       Physical Therapy       Physical Therapy Goal       Start:  02/19/25    Expected End:  04/03/25       Goals:  Short Term Goals: 3 weeks   Pt will be compliant with HEP.  Pt will improve quad strength by 1/2 grade to allow for strength to go up and down stairs.   Pt will improve sit <>  30 sec to at least 10 allow for increased functional mobility.     Long Term Goals: 6 weeks   Pt will be independent with HEP to allow for increased functional tasks.  Pt will improve FOTO by 10 % to demonstrate improvement in quality of life.  Pt will improve hip flexor strength by 1/2 grade to allow for normalized body mechanics.   Pt will be able to tolerate gait a half of mile without stopping to improve daily activities.              Ginette Brown, PT

## 2025-03-18 ENCOUNTER — HOSPITAL ENCOUNTER (OUTPATIENT)
Dept: RADIOLOGY | Facility: HOSPITAL | Age: 70
Discharge: HOME | End: 2025-03-18
Attending: INTERNAL MEDICINE
Payer: MEDICARE

## 2025-03-18 DIAGNOSIS — Z12.31 BREAST CANCER SCREENING BY MAMMOGRAM: ICD-10-CM

## 2025-03-18 PROCEDURE — 77067 SCR MAMMO BI INCL CAD: CPT | Mod: TC

## 2025-03-18 PROCEDURE — 77063 BREAST TOMOSYNTHESIS BI: CPT | Mod: 26,,, | Performed by: RADIOLOGY

## 2025-03-18 PROCEDURE — 77067 SCR MAMMO BI INCL CAD: CPT | Mod: 26,,, | Performed by: RADIOLOGY

## 2025-03-19 ENCOUNTER — CLINICAL SUPPORT (OUTPATIENT)
Dept: REHABILITATION | Facility: HOSPITAL | Age: 70
End: 2025-03-19
Payer: MEDICARE

## 2025-03-19 DIAGNOSIS — Z74.09 IMPAIRED MOBILITY: Primary | ICD-10-CM

## 2025-03-19 PROCEDURE — 97110 THERAPEUTIC EXERCISES: CPT | Mod: PN

## 2025-03-19 PROCEDURE — 97150 GROUP THERAPEUTIC PROCEDURES: CPT | Mod: PN

## 2025-03-19 PROCEDURE — 97530 THERAPEUTIC ACTIVITIES: CPT | Mod: PN

## 2025-03-19 NOTE — PROGRESS NOTES
Outpatient Rehab    Physical Therapy Visit    Patient Name: Edith Jackson  MRN: [id number]  YOB: 1955  Encounter Date: 3/19/2025    Therapy Diagnosis:   Encounter Diagnosis   Name Primary?    Impaired mobility Yes     Physician: Chandrika Varghese MD    Physician Orders: Eval and Treat  Medical Diagnosis: Flank pain    Visit # / Visits Authorized:  6 / 10  Date of Evaluation: 2/20/25  Insurance Authorization Period: 2/19/2025 to 12/31/2025  Plan of Care Certification:  2/20/25 to 5/20/25      PT/PTA:     Number of PTA visits since last PT visit:   Time In: 0955   Time Out: 1040  Total Time: 45   Total Billable Time: 40    FOTO:  Intake Score:  %  Survey Score 1:  %  Survey Score 2:  %         Subjective   She feels better today. Walked 4 miles yesterday without pain. Ready to d/c..  Pain reported as 0/10.      Objective            Treatment:  Therapeutic Exercise  TE 1: Scapular Retraction w/ BTB x 20  TE 2: Wall push ups x 20  TE 3: Standing UE extension w/ band (red) x 20  TE 4: Seated side bending stretch; L arm overhead 3 x 15 sec  TE 5: Paloff press out w/ band (green) 15 each  TE 6: cable lat pull downs 7# x 15  TE 7: Cable cross rows x 15 ea 7#  Therapeutic Activity  TA 1: Nu-step L-5 x 10 min; seat#8  TA 2: Seated Fwd and side roll outs w/ SB; 2 sec  hold in ea direction x 3 min  TA 3: Seated overhead press on Swiss ball ( focus on core stability) x 20 4#  TA 4: Seated on SB UE raises 2 x 10 w/ 4#  TA 5: Seated on SB diagonal lifts w/ rotation; holding ball x 10 ea  TA 6: seated on SB opp hand  opp leg lifts 10x  TA 7: Seated SB rows w/ band (green) x 20  TA 9: Pulleys x 3 min (seated)    Time Entry(in minutes):  Group Therapy Time Entry: 15  Therapeutic Activity Time Entry: 13  Therapeutic Exercise Time Entry: 12    Assessment & Plan   Assessment: No complaints of pain while doing the exercises. Back pain did not affect her performance today. Pt is reacy to d/c to exercising on her  own. Billed time reflects one on one treatment.       Patient will continue to benefit from skilled outpatient physical therapy to address the deficits listed in the problem list box on initial evaluation, provide pt/family education and to maximize pt's level of independence in the home and community environment.     Patient's spiritual, cultural, and educational needs considered and patient agreeable to plan of care and goals.           Plan: D/c from PT    Goals:   Active       Physical Therapy       Physical Therapy Goal       Start:  02/19/25    Expected End:  04/03/25       Goals:  Short Term Goals: 3 weeks   Pt will be compliant with HEP.  Pt will improve quad strength by 1/2 grade to allow for strength to go up and down stairs.   Pt will improve sit <>  30 sec to at least 10 allow for increased functional mobility.     Long Term Goals: 6 weeks   Pt will be independent with HEP to allow for increased functional tasks.  Pt will improve FOTO by 10 % to demonstrate improvement in quality of life.  Pt will improve hip flexor strength by 1/2 grade to allow for normalized body mechanics.   Pt will be able to tolerate gait a half of mile without stopping to improve daily activities.              Ginette Brown, PT

## 2025-03-21 ENCOUNTER — OFFICE VISIT (OUTPATIENT)
Dept: FAMILY MEDICINE | Facility: CLINIC | Age: 70
End: 2025-03-21
Payer: MEDICARE

## 2025-03-21 VITALS
BODY MASS INDEX: 21.54 KG/M2 | OXYGEN SATURATION: 99 % | HEIGHT: 64 IN | SYSTOLIC BLOOD PRESSURE: 128 MMHG | HEART RATE: 73 BPM | WEIGHT: 126.19 LBS | DIASTOLIC BLOOD PRESSURE: 74 MMHG | RESPIRATION RATE: 14 BRPM

## 2025-03-21 DIAGNOSIS — I70.0 AORTIC ATHEROSCLEROSIS: ICD-10-CM

## 2025-03-21 DIAGNOSIS — G47.9 SLEEP DISORDER: ICD-10-CM

## 2025-03-21 DIAGNOSIS — I10 ESSENTIAL HYPERTENSION: ICD-10-CM

## 2025-03-21 PROCEDURE — 99213 OFFICE O/P EST LOW 20 MIN: CPT | Mod: PBBFAC | Performed by: FAMILY MEDICINE

## 2025-03-21 PROCEDURE — 99999 PR PBB SHADOW E&M-EST. PATIENT-LVL III: CPT | Mod: PBBFAC,,, | Performed by: FAMILY MEDICINE

## 2025-03-21 RX ORDER — ROSUVASTATIN CALCIUM 20 MG/1
20 TABLET, COATED ORAL NIGHTLY
Qty: 90 TABLET | Refills: 3 | Status: SHIPPED | OUTPATIENT
Start: 2025-03-21

## 2025-03-21 RX ORDER — METOPROLOL SUCCINATE 50 MG/1
50 TABLET, EXTENDED RELEASE ORAL DAILY
Qty: 90 TABLET | Refills: 1 | Status: SHIPPED | OUTPATIENT
Start: 2025-03-21

## 2025-03-21 NOTE — PROGRESS NOTES
Patient ID: Edith Jackson is a 69 y.o. female.    Chief Complaint: Follow-up (3 month)    History of Present Illness    CHIEF COMPLAINT:  Edith presents today for three month follow-up    COVID-19 HISTORY:  She reports john COVID-19 while traveling by plane in October, experiencing one day of severe illness followed by lingering symptoms. She has since fully recovered.    MUSCULOSKELETAL:  She reports improvement in her previous subscapular pain following physical therapy. She has resumed brisk walking since pain resolution.    FAMILY HISTORY:  She reports a strong family history of cardiovascular disease. Her brother had a calcium score of 1,000 requiring two stents and  suddenly at age 52. Heart attacks, strokes, and hypertension are prevalent in her family.    MEDICATIONS:  Current medications include Amlodipine, baby aspirin, Synthroid, Metoprolol, Rosuvastatin, Tizanidine, and Trazodone. She denies current use of ibuprofen.    LABS:  Sodium was slightly low at 133 but improved from previous results. Vitamin D was on the low side of normal, and she admits inconsistent vitamin D supplementation. CBC and iron anemia panel were normal. Lipid panel shows total cholesterol 184, HDL 80, LDL 93, and triglycerides 56.      ROS:  ROS as indicated in HPI.         Physical Exam  Constitutional:       Appearance: Normal appearance.   HENT:      Head: Normocephalic and atraumatic.      Nose: Nose normal.   Eyes:      Extraocular Movements: Extraocular movements intact.      Pupils: Pupils are equal, round, and reactive to light.   Pulmonary:      Effort: Pulmonary effort is normal.   Musculoskeletal:         General: Normal range of motion.   Neurological:      General: No focal deficit present.      Mental Status: She is alert and oriented to person, place, and time.   Psychiatric:         Mood and Affect: Mood normal.         Behavior: Behavior normal.         Thought Content: Thought content  normal.          Assessment & Plan    I10 Essential (primary) hypertension  E03.9 Hypothyroidism, unspecified  E78.5 Hyperlipidemia, unspecified  E87.1 Hypo-osmolality and hyponatremia  E55.9 Vitamin D deficiency, unspecified  Z82.49 Family history of ischemic heart disease and other diseases of the circulatory system  Z86.16 Personal history of COVID-19    IMPRESSION:  - Reviewed recent lab results, noting overall good health status.  - Assessed lipid panel, calculating cardiovascular risk assessment (2.3) based on total cholesterol/HDL ratio.  - Evaluated sodium levels, noting slight improvement from previous results (133, previously lower).  - Considered potential causes for slightly low granulocytes and elevated lymphocytes, possibly due to recent viral infection.  - Determined current medication regimen is appropriate given lab results and overall health status.  - Will provide longer-term refills for medications due to stable health and compliance.    HYPERTENSION:  - Continued metoprolol and amlodipine for hypertension management.  - Reviewed medication history and refill status for hypertension medications.  - Noted that the current amlodipine prescription is valid until July 2025.  - Planned to refill the patient's hypertension medications, including amlodipine and metoprolol, for a 1-year supply.    HYPOTHYROIDISM:  - Continued Synthroid for hypothyroidism treatment.  - Reviewed the patient's Synthroid prescription and refill status.  - Noted that the current Synthroid prescription is valid until June.    HYPERLIPIDEMIA:  - Monitored the patient's cholesterol levels: total cholesterol 184, HDL 80, LDL 93, and triglycerides 56.  - Evaluated the patient's cholesterol levels as good, with a cardiovascular risk assessment of 2.3.  - Explained the significance of HDL (high-density lipoprotein) in cholesterol management.  - Discussed the relationship between cholesterol levels, arterial health, and  cardiovascular risk.  - Explained the concept of hereditary cholesterol issues and how the body produces cholesterol.  - Continued rosuvastatin 20mg for hyperlipidemia treatment.  - Prescribed a 1-year supply of rosuvastatin.    HYPONATREMIA:  - Monitored the patient's sodium level, currently at 133, which is slightly low but improved from previous readings.  - Discussed potential causes of low sodium, including medications and their effects on sodium levels.  - Continued to monitor sodium levels without specific treatment for hyponatremia.    VITAMIN D DEFICIENCY:  - Monitored the patient's vitamin D level, currently on the low side of normal (range 30 to 96).  - Discussed the importance of vitamin D supplementation and its role in preventing osteoporosis.  - Advised the patient to take vitamin D supplements daily.    FAMILY HISTORY OF CARDIOVASCULAR DISEASES:  - Noted the patient's family history of heart attacks and strokes.  - Discussed the concept of hereditary cholesterol issues and its impact on the patient's risk assessment.  - Confirmed that the patient is under the care of a cardiologist due to family history of cardiovascular diseases.    PERSONAL HISTORY OF COVID-19:  - Noted that patient reports having COVID-19 in October.  - Acknowledged the patient's recent COVID-19 infection and its impact on their immunity.  - No specific treatment was prescribed for the past COVID-19 infection, as th  e patient has immunity due to recent infection.    GENERAL HEALTH AND FOLLOW-UP:  - Edith to continue walking regimen as it is beneficial for overall health.  - Explained meaning of various blood test components (e.g., granulocytes, lymphocytes, mean platelet volume).  - Continued trazodone (current prescription valid until May).  - Follow up in 1 year.  - Contact the office if need arises before next scheduled appointment, either through e-visit, portal message, or in-person appointment.         Plan:           Sleep disorder  Comments:  Takes trazodone for sleep    Essential hypertension  -     metoprolol succinate (TOPROL-XL) 50 MG 24 hr tablet; Take 1 tablet (50 mg total) by mouth once daily.  Dispense: 90 tablet; Refill: 1    Aortic atherosclerosis  -     rosuvastatin (CRESTOR) 20 MG tablet; Take 1 tablet (20 mg total) by mouth every evening.  Dispense: 90 tablet; Refill: 3        Follow up in about 1 year (around 3/21/2026), or if symptoms worsen or fail to improve.    This note was generated with the assistance of ambient listening technology. Verbal consent was obtained by the patient and accompanying visitor(s) for the recording of patient appointment to facilitate this note. I attest to having reviewed and edited the generated note for accuracy, though some syntax or spelling errors may persist. Please contact the author of this note for any clarification.

## 2025-06-10 DIAGNOSIS — G47.9 SLEEP DISORDER: ICD-10-CM

## 2025-06-10 RX ORDER — TRAZODONE HYDROCHLORIDE 50 MG/1
50 TABLET ORAL NIGHTLY
Qty: 90 TABLET | Refills: 1 | Status: SHIPPED | OUTPATIENT
Start: 2025-06-10

## 2025-06-10 NOTE — TELEPHONE ENCOUNTER
Copied from CRM #[id number]. Topic: Medications - Medication Refill  >> Frank 10, 2025  9:23 AM Mandy wrote:  Type:  RX Refill Request    Who Called:  Pt   Refill or New Rx:  New   RX Name and Strength:  traZODone (DESYREL) 50 MG tablet   How is the patient currently taking it? (ex. 1XDay):  1xday   Is this a 30 day or 90 day RX:  90  Preferred Pharmacy with phone number:    [id number] [place] [id number] AT [facility] [address]  [address] [place] [id number]  Phone: [phone] Fax: [phone]      Local or Mail Order:  Local   Ordering Provider:  Roberto Ivey MD   Best Call Back Number:  [phone]    Additional Information:  Pt asking if office can check if she has any other rxs under Dr Aguilar names and change it over to Dr Varghese.

## 2025-06-10 NOTE — TELEPHONE ENCOUNTER
Refill Routing Note   Medication(s) are not appropriate for processing by [facility] for the following reason(s):        No active prescription written by provider    [facility] action(s):  Defer             Appointments  past 12m or future 3m with PCP    Date Provider   Last Visit   3/21/2025 Chandrika Varghese MD   Next Visit   Visit date not found Chandrika Varghese MD   ED visits in past 90 days: 0        Note composed:2:45 PM 06/10/2025

## 2025-06-10 NOTE — TELEPHONE ENCOUNTER
No care due was identified.  [facility] Embedded Care Due Messages. Reference number: [id number].   6/10/2025 9:32:38 AM CDT

## 2025-06-10 NOTE — TELEPHONE ENCOUNTER
Copied from CRM #[id number]. Topic: Medications - Medication Refill  >> Frank 10, 2025  2:39 PM Carmen wrote:  Type:  RX Refill Request    Who Called:  PT  Refill or New Rx: New RX  RX Name and Strength:  traZODone (DESYREL) 50 MG tablet  How is the patient currently taking it?  Take 1 tablet (50 mg total) by mouth every evening. - Oral  Is this a 30 day or 90 day RX: 90  Preferred Pharmacy with phone number:    [facility] #[id number] - [place] [address] AT [address]  [address] [place] [id number]  Phone: [phone] Fax: [phone]    Best Call Back Number:  [phone]  Additional Information:  Pharm needs  new prescription sent with provider signature

## 2025-07-28 ENCOUNTER — HOSPITAL ENCOUNTER (EMERGENCY)
Facility: HOSPITAL | Age: 70
Discharge: HOME | End: 2025-07-28
Attending: EMERGENCY MEDICINE
Payer: MEDICARE

## 2025-07-28 VITALS
SYSTOLIC BLOOD PRESSURE: 145 MMHG | WEIGHT: 125 LBS | RESPIRATION RATE: 18 BRPM | HEART RATE: 68 BPM | DIASTOLIC BLOOD PRESSURE: 76 MMHG | TEMPERATURE: 98 F | OXYGEN SATURATION: 100 % | BODY MASS INDEX: 21.34 KG/M2 | HEIGHT: 64 IN

## 2025-07-28 DIAGNOSIS — S92.355A CLOSED NONDISPLACED FRACTURE OF FIFTH METATARSAL BONE OF LEFT FOOT, INITIAL ENCOUNTER: Primary | ICD-10-CM

## 2025-07-28 DIAGNOSIS — S99.922A INJURY OF LEFT FOOT: ICD-10-CM

## 2025-07-28 PROCEDURE — 73630 X-RAY EXAM OF FOOT: CPT | Mod: 26,LT,, | Performed by: RADIOLOGY

## 2025-07-28 PROCEDURE — 25000003 PHARM REV CODE 250: Performed by: EMERGENCY MEDICINE

## 2025-07-28 PROCEDURE — 73630 X-RAY EXAM OF FOOT: CPT | Mod: TC,LT

## 2025-07-28 PROCEDURE — 29515 APPLICATION SHORT LEG SPLINT: CPT | Mod: LT

## 2025-07-28 PROCEDURE — 99283 EMERGENCY DEPT VISIT LOW MDM: CPT | Mod: 25

## 2025-07-28 RX ORDER — HYDROCODONE BITARTRATE AND ACETAMINOPHEN 10; 325 MG/1; MG/1
1 TABLET ORAL
Refills: 0 | Status: COMPLETED | OUTPATIENT
Start: 2025-07-28 | End: 2025-07-28

## 2025-07-28 RX ORDER — ONDANSETRON 4 MG/1
4 TABLET, ORALLY DISINTEGRATING ORAL
Status: COMPLETED | OUTPATIENT
Start: 2025-07-28 | End: 2025-07-28

## 2025-07-28 RX ORDER — HYDROCODONE BITARTRATE AND ACETAMINOPHEN 10; 325 MG/1; MG/1
1 TABLET ORAL EVERY 6 HOURS PRN
Qty: 12 TABLET | Refills: 0 | Status: SHIPPED | OUTPATIENT
Start: 2025-07-28

## 2025-07-28 RX ADMIN — HYDROCODONE BITARTRATE AND ACETAMINOPHEN 1 TABLET: 10; 325 TABLET ORAL at 01:07

## 2025-07-28 RX ADMIN — ONDANSETRON 4 MG: 4 TABLET, ORALLY DISINTEGRATING ORAL at 01:07

## 2025-07-30 ENCOUNTER — OFFICE VISIT (OUTPATIENT)
Dept: PODIATRY | Facility: CLINIC | Age: 70
End: 2025-07-30
Payer: MEDICARE

## 2025-07-30 VITALS
HEART RATE: 68 BPM | SYSTOLIC BLOOD PRESSURE: 142 MMHG | BODY MASS INDEX: 21.19 KG/M2 | DIASTOLIC BLOOD PRESSURE: 83 MMHG | WEIGHT: 123.44 LBS

## 2025-07-30 DIAGNOSIS — Z96.9 PRESENCE OF RETAINED HARDWARE: ICD-10-CM

## 2025-07-30 DIAGNOSIS — M20.12 HALLUX ABDUCTOVALGUS, LEFT: ICD-10-CM

## 2025-07-30 DIAGNOSIS — S99.922A INJURY OF LEFT FOOT, INITIAL ENCOUNTER: ICD-10-CM

## 2025-07-30 DIAGNOSIS — S92.355A CLOSED NONDISPLACED FRACTURE OF FIFTH METATARSAL BONE OF LEFT FOOT, INITIAL ENCOUNTER: Primary | ICD-10-CM

## 2025-07-30 DIAGNOSIS — R60.0 PEDAL EDEMA: ICD-10-CM

## 2025-07-30 PROCEDURE — 99204 OFFICE O/P NEW MOD 45 MIN: CPT | Mod: S$PBB,,, | Performed by: PODIATRIST

## 2025-07-30 PROCEDURE — 99999PBSHW PR PBB SHADOW TECHNICAL ONLY FILED TO HB: Mod: PBBFAC,,,

## 2025-07-30 PROCEDURE — 99999 PR PBB SHADOW E&M-EST. PATIENT-LVL IV: CPT | Mod: PBBFAC,,, | Performed by: PODIATRIST

## 2025-07-30 PROCEDURE — 99214 OFFICE O/P EST MOD 30 MIN: CPT | Mod: PBBFAC | Performed by: PODIATRIST

## 2025-07-30 PROCEDURE — 96372 THER/PROPH/DIAG INJ SC/IM: CPT | Mod: PBBFAC

## 2025-07-30 RX ORDER — HYDROCODONE BITARTRATE AND ACETAMINOPHEN 7.5; 325 MG/1; MG/1
1 TABLET ORAL EVERY 8 HOURS PRN
Qty: 21 TABLET | Refills: 0 | Status: SHIPPED | OUTPATIENT
Start: 2025-07-30 | End: 2025-08-06

## 2025-07-30 RX ORDER — BETAMETHASONE SODIUM PHOSPHATE AND BETAMETHASONE ACETATE 3; 3 MG/ML; MG/ML
18 INJECTION, SUSPENSION INTRA-ARTICULAR; INTRALESIONAL; INTRAMUSCULAR; SOFT TISSUE
Status: COMPLETED | OUTPATIENT
Start: 2025-07-30 | End: 2025-07-30

## 2025-07-30 RX ADMIN — BETAMETHASONE SODIUM PHOSPHATE AND BETAMETHASONE ACETATE 18 MG: 3; 3 INJECTION, SUSPENSION INTRA-ARTICULAR; INTRALESIONAL; INTRAMUSCULAR at 02:07

## 2025-07-31 ENCOUNTER — TELEPHONE (OUTPATIENT)
Dept: ORTHOPEDICS | Facility: CLINIC | Age: 70
End: 2025-07-31
Payer: MEDICARE

## 2025-07-31 ENCOUNTER — OFFICE VISIT (OUTPATIENT)
Dept: ORTHOPEDICS | Facility: CLINIC | Age: 70
End: 2025-07-31
Payer: MEDICARE

## 2025-07-31 VITALS — WEIGHT: 123.88 LBS | HEIGHT: 64 IN | BODY MASS INDEX: 21.15 KG/M2

## 2025-07-31 DIAGNOSIS — S92.355A CLOSED NONDISPLACED FRACTURE OF FIFTH METATARSAL BONE OF LEFT FOOT, INITIAL ENCOUNTER: Primary | ICD-10-CM

## 2025-07-31 PROCEDURE — 99999 PR PBB SHADOW E&M-EST. PATIENT-LVL III: CPT | Mod: PBBFAC,,, | Performed by: ORTHOPAEDIC SURGERY

## 2025-07-31 PROCEDURE — 99213 OFFICE O/P EST LOW 20 MIN: CPT | Mod: PBBFAC,PN | Performed by: ORTHOPAEDIC SURGERY

## 2025-07-31 PROCEDURE — 99204 OFFICE O/P NEW MOD 45 MIN: CPT | Mod: S$PBB,,, | Performed by: ORTHOPAEDIC SURGERY

## 2025-07-31 NOTE — PROGRESS NOTES
Subjective:      Patient ID: Edith Jackson is a 69 y.o. female.    Chief Complaint: Injury and Pain of the Left Foot (DOI: 07/27/2025- foot got caught in a hole tripped but declines falling to ground. )    HPI  69-year-old female with a several day history of left foot discomfort.  She sustained accidental blunt trauma when she twisted her foot when she got her foot caught in a hole.  Was seen in the emergency department referred for further evaluation.  ROS      Objective:    Ortho Exam     Constitutional:   Patient is alert  and oriented in no acute distress  HEENT:  normocephalic atraumatic; PERRL EOMI  Neck:  Supple without adenopathy  Cardiovascular:  Normal rate and rhythm  Pulmonary:  Normal respiratory effort normal chest wall expansion  Abdominal:  Nonprotuberant nondistended  Musculoskeletal:  Diffuse tenderness resolving ecchymosis over the lateral aspect of the foot and ankle  Mild limitation of range of motion secondary to pain  Intact skin, sensation, and brisk capillary refill of the digits  Neurological:  No focal defect; cranial nerves 2-12 grossly intact  Psychiatric/behavioral:  Mood and behavior normal      X-Ray Foot Complete Left  Narrative: EXAMINATION:  XR FOOT COMPLETE 3 VIEW LEFT    CLINICAL HISTORY:  .  Unspecified injury of left foot, initial encounter    TECHNIQUE:  AP, lateral and oblique views of the left foot were performed.    COMPARISON:  None    FINDINGS:  There is a minimally displaced peroneus brevis avulsion fracture of the base of the left 5th metatarsal.  Remainder of the bones are intact and located.  Impression: Peroneus brevis avulsion fracture.    Electronically signed by: Mauricio Yao MD  Date:    07/28/2025  Time:    14:38       My Radiographs Findings:    Radiographs of the left foot consent that is consistent with a minimally displaced base of the left 5th metatarsal  fracture  Assessment:       Encounter Diagnosis   Name Primary?    Closed nondisplaced  fracture of fifth metatarsal bone of left foot, initial encounter Yes         Plan:       I have discussed medical condition treatment options with her at length.  She prefers a conservative approach to her treatment which I think at this point as reasonable at this is a non Guthrie type fracture.  I have discussed possibility of malunion nonunion or fibrous union.  We have discussed protected weight-bearing cam boot use crutches ice elevation compressive wrapping follow up radiographs in 3-4 weeks to assess fracture stability I will see her sooner if any questions or problems.        Past Medical History:   Diagnosis Date    Acquired hypothyroidism     Arthritis     Asthma     H/O: pneumonia     Hypertension     Hypotension, iatrogenic     post-op    Osteopenia     Post-menopausal bleeding 01/2023    Respiratory distress      Past Surgical History:   Procedure Laterality Date    AUGMENTATION OF BREAST Bilateral     removal    BREAST RECONSTRUCTION      COLONOSCOPY N/A 03/17/2022    Procedure: COLONOSCOPY;  Surgeon: Alessandra Springer MD;  Location: [facility];  Service: General;  Laterality: N/A;    COSMETIC SURGERY      EPIDURAL STEROID INJECTION INTO CERVICAL SPINE N/A 01/13/2023    Procedure: Injection-steroid-epidural-cervical;  Surgeon: Larry Thomason MD;  Location: [facility] OR;  Service: Pain Management;  Laterality: N/A;  C7-T1    HIP REPLACEMENT ARTHROPLASTY Right     HYSTEROSCOPY WITH DILATION AND CURETTAGE OF UTERUS N/A 01/30/2023    Procedure: HYSTEROSCOPY, WITH DILATION AND CURETTAGE OF UTERUS;possible myosure;  Surgeon: Carie Dwyer MD;  Location: [facility];  Service: OB/GYN;  Laterality: N/A;    INJECTION OF ANESTHETIC AGENT AROUND MEDIAL BRANCH NERVES INNERVATING CERVICAL FACET JOINT Right 05/26/2023    Procedure: Block-nerve-medial branch-cervical C3-C4-C5-C6;  Surgeon: Larry Thomason MD;  Location: [facility];  Service: Pain Management;  Laterality: Right;    INJECTION OF ANESTHETIC AGENT AROUND MEDIAL BRANCH  NERVES INNERVATING CERVICAL FACET JOINT Right 06/21/2023    Procedure: Block-nerve-medial branch-cervical;  Surgeon: Larry Thomason MD;  Location: [facility] OR;  Service: Pain Management;  Laterality: Right;  c3,4,5,6 Mbb #2    JOINT REPLACEMENT      RADIOFREQUENCY THERMAL COAGULATION OF MEDIAL BRANCH OF POSTERIOR RAMUS OF CERVICAL SPINAL NERVE Right 07/19/2023    Procedure: RADIOFREQUENCY THERMAL COAGULATION, NERVE, SPINAL, CERVICAL, POSTERIOR RAMUS, MEDIAL BRANCH;  Surgeon: Larry Thomason MD;  Location: [facility] OR;  Service: Anesthesiology;  Laterality: Right;  C3,4,5,6 RFA    TRANSFORAMINAL EPIDURAL INJECTION OF STEROID Right 03/17/2023    Procedure: Injection,steroid,cervical,transforaminal,   Right C6-C7, C7-T1;  Surgeon: Larry Thomason MD;  Location: [facility] OR;  Service: Pain Management;  Laterality: Right;       Current Medications[1]    Review of patient's allergies indicates:   Allergen Reactions    Percocet [oxycodone-acetaminophen] Other (See Comments)     Headaches    Penicillins Rash       Family History   Problem Relation Name Age of Onset    Heart disease Mother Steff     Diabetes Mother Steff     Stroke Mother Steff     Heart attack Mother Steff     Thyroid disease Mother Steff     Hypertension Mother Steff     Heart attack Father Samy 74    Hearing loss Father Samy     Thyroid disease Sister      Heart disease Brother          stents x2    Hypertension Brother Eduardo     Breast cancer Neg Hx      Ovarian cancer Neg Hx       Social History     Occupational History    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   Tobacco Use    Smoking status: Never     Passive exposure: Never    Smokeless tobacco: Never   Substance and Sexual Activity    Alcohol use: Yes     Alcohol/week: 2.0 standard drinks of alcohol     Types: 2 Glasses of wine per week     Comment: socially    Drug use: Never    Sexual activity: Yes     Partners: Male            [1]   Current Outpatient Medications:     amLODIPine (NORVASC) 2.5 MG tablet, , Disp: , Rfl:      aspirin (ECOTRIN) 81 MG EC tablet, Take 1 tablet (81 mg total) by mouth once daily., Disp: 90 tablet, Rfl: 3    diphenoxylate-atropine 2.5-0.025 mg (LOMOTIL) 2.5-0.025 mg per tablet, Take by mouth., Disp: , Rfl:     HYDROcodone-acetaminophen (NORCO)  mg per tablet, Take 1 tablet by mouth every 6 (six) hours as needed for Pain., Disp: 12 tablet, Rfl: 0    HYDROcodone-acetaminophen (NORCO) 7.5-325 mg per tablet, Take 1 tablet by mouth every 8 (eight) hours as needed for Pain., Disp: 21 tablet, Rfl: 0    levothyroxine (SYNTHROID) 50 MCG tablet, Take 1 tablet (50 mcg total) by mouth before breakfast., Disp: 90 tablet, Rfl: 1    metoprolol succinate (TOPROL-XL) 50 MG 24 hr tablet, Take 1 tablet (50 mg total) by mouth once daily., Disp: 90 tablet, Rfl: 1    rosuvastatin (CRESTOR) 20 MG tablet, Take 1 tablet (20 mg total) by mouth every evening., Disp: 90 tablet, Rfl: 3    tiZANidine (ZANAFLEX) 4 MG tablet, Take 1 tablet (4 mg total) by mouth every 8 (eight) hours as needed (muscle spasm)., Disp: 30 tablet, Rfl: 0    traZODone (DESYREL) 50 MG tablet, Take 1 tablet (50 mg total) by mouth every evening., Disp: 90 tablet, Rfl: 1    vitamin D (VITAMIN D3) 1000 units Tab, Take 1,000 Units by mouth once daily., Disp: , Rfl:   No current facility-administered medications for this visit.

## 2025-07-31 NOTE — TELEPHONE ENCOUNTER
DOS: 08/08/2025 with Dr. Bravo. Pt seen today by Dr. Brown and wishes to cancel surgery with Dr. Bravo.   Appointment with Dr. Bravo and Dr. Brody cancelled at pt request.

## 2025-07-31 NOTE — PROGRESS NOTES
Subjective:       Patient ID: Edith Jackson is a 69 y.o. female.    Chief Complaint: Foot Injury and Bunions  CHIEF COMPLAINT:  - Right foot fracture    HPI:  Edith presents with a friend today utilizing a wheelchair for evaluation of a right foot injury that occurred on Sunday evening. She stepped in a crack in a driveway while walking through [facility], twisting her foot. Pain became severe, prompting an emergency room visit where XRs were taken. The ER diagnosed a fracture, stating that the tendon caused a bone fragment to break off. They suggested it might need a pin or could heal on its own.    After the injury, she attempted to contact people for help but was unsuccessful, so she continued walking on the injured foot. She expresses concern about letting it heal naturally due to her age, fearing potential lifelong complications.    She lives alone in a small apartment with stairs, expressing concern about mobility during recovery.    She notes a pre-existing bunion on her left foot, which appears more swollen today. She had surgery on the right foot for a bunion approximately 23-25 years ago, which involved the placement of a plate and two screws. She states that one of the screws is beginning to shift and that she is developing a new bunion on that foot.    She denies any relevant medical history apart from the mentioned foot issues.    IMAGING:  - XR Right Foot: Revealed a fracture at the base of the fifth metatarsal. The XRs showed a through and through fracture with some gapping where a tendon attaches.    Past Medical History:   Diagnosis Date    Acquired hypothyroidism     Arthritis     Asthma     H/O: pneumonia     Hypertension     Hypotension, iatrogenic     post-op    Osteopenia     Post-menopausal bleeding 01/2023    Respiratory distress      Past Surgical History:   Procedure Laterality Date    AUGMENTATION OF BREAST Bilateral     removal    BREAST RECONSTRUCTION       COLONOSCOPY N/A 03/17/2022    Procedure: COLONOSCOPY;  Surgeon: Alessandra Springer MD;  Location: [facility];  Service: General;  Laterality: N/A;    COSMETIC SURGERY      EPIDURAL STEROID INJECTION INTO CERVICAL SPINE N/A 01/13/2023    Procedure: Injection-steroid-epidural-cervical;  Surgeon: Larry Thomason MD;  Location: [facility] OR;  Service: Pain Management;  Laterality: N/A;  C7-T1    HIP REPLACEMENT ARTHROPLASTY Right     HYSTEROSCOPY WITH DILATION AND CURETTAGE OF UTERUS N/A 01/30/2023    Procedure: HYSTEROSCOPY, WITH DILATION AND CURETTAGE OF UTERUS;possible myosure;  Surgeon: Carie Dwyer MD;  Location: [facility];  Service: OB/GYN;  Laterality: N/A;    INJECTION OF ANESTHETIC AGENT AROUND MEDIAL BRANCH NERVES INNERVATING CERVICAL FACET JOINT Right 05/26/2023    Procedure: Block-nerve-medial branch-cervical C3-C4-C5-C6;  Surgeon: Larry Thomason MD;  Location: [facility] OR;  Service: Pain Management;  Laterality: Right;    INJECTION OF ANESTHETIC AGENT AROUND MEDIAL BRANCH NERVES INNERVATING CERVICAL FACET JOINT Right 06/21/2023    Procedure: Block-nerve-medial branch-cervical;  Surgeon: Larry Thomason MD;  Location: [facility] OR;  Service: Pain Management;  Laterality: Right;  c3,4,5,6 Mbb #2    JOINT REPLACEMENT      RADIOFREQUENCY THERMAL COAGULATION OF MEDIAL BRANCH OF POSTERIOR RAMUS OF CERVICAL SPINAL NERVE Right 07/19/2023    Procedure: RADIOFREQUENCY THERMAL COAGULATION, NERVE, SPINAL, CERVICAL, POSTERIOR RAMUS, MEDIAL BRANCH;  Surgeon: Larry Thomason MD;  Location: [facility] OR;  Service: Anesthesiology;  Laterality: Right;  C3,4,5,6 RFA    TRANSFORAMINAL EPIDURAL INJECTION OF STEROID Right 03/17/2023    Procedure: Injection,steroid,cervical,transforaminal,   Right C6-C7, C7-T1;  Surgeon: Larry Thomason MD;  [facility];  Service: Pain Management;  Laterality: Right;     Family History   Problem Relation Name Age of Onset    Heart disease Mother Steff     Diabetes Mother Steff     Stroke Mother Steff     Heart attack Mother Steff      Thyroid disease Mother Steff     Hypertension Mother Steff     Heart attack Father Samy 74    Hearing loss Father Samy     Thyroid disease Sister      Heart disease Brother          stents x2    Hypertension Brother Eduardo     Breast cancer Neg Hx      Ovarian cancer Neg Hx       Social History     Socioeconomic History    Marital status:     Number of children: 2    Highest education level: Master's degree (e.g., MA, MS, [place], MEd, MSW, RUSS)   Occupational History    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   Tobacco Use    Smoking status: Never    Smokeless tobacco: Never   Substance and Sexual Activity    Alcohol use: Yes     Alcohol/week: 2.0 standard drinks of alcohol     Types: 2 Glasses of wine per week     Comment: socially    Drug use: Never    Sexual activity: Yes     Partners: Male     Social Drivers of Health     Financial Resource Strain: Low Risk  (9/17/2024)    Overall Financial Resource Strain (CARDIA)     Difficulty of Paying Living Expenses: Not hard at all   Food Insecurity: No Food Insecurity (9/17/2024)    Hunger Vital Sign     Worried About Running Out of Food in the Last Year: Never true     Ran Out of Food in the Last Year: Never true   Transportation Needs: Unknown (3/11/2022)    PRAPARE - Transportation     Lack of Transportation (Medical): Patient declined     Lack of Transportation (Non-Medical): Patient declined   Physical Activity: Unknown (9/17/2024)    Exercise Vital Sign     Days of Exercise per Week: Patient declined   Stress: Patient Declined (9/17/2024)    [employer] [place] [employer] - Occupational Stress Questionnaire     Feeling of Stress : Patient declined   Housing Stability: Unknown (9/17/2024)    Housing Stability Vital Sign     Unable to Pay for Housing in the Last Year: No       Current Medications[1]  Review of patient's allergies indicates:   Allergen Reactions    Percocet [oxycodone-acetaminophen] Other (See Comments)     Headaches    Penicillins Rash        Review of Systems   Musculoskeletal:  Positive for arthralgias and gait problem.   All other systems reviewed and are negative.      Objective:      Vitals:    07/30/25 1358   BP: (!) 142/83   Pulse: 68   Weight: 56 kg (123 lb 7.3 oz)     Physical Exam  Vitals and nursing note reviewed. Exam conducted with a chaperone present.   Constitutional:       General: She is not in acute distress.     Appearance: Normal appearance.   Cardiovascular:      Pulses:           Dorsalis pedis pulses are 2+ on the right side and 2+ on the left side.        Posterior tibial pulses are 2+ on the right side and 2+ on the left side.   Musculoskeletal:         General: Swelling, tenderness and deformity present.      Right foot: Decreased range of motion. Bunion (Mild HAV right foot with history of correction) present.      Left foot: Decreased range of motion. Bunion (Moderate arthritic and degenerative changes with hallux rigidus secondary to HAV left foot) present.   Feet:      Right foot:      Skin integrity: No erythema (Firm area dorsal medial right foot which coincides with possible migrated hardware from previous HAV surgery without erythema or calor at this time).      Left foot:      Skin integrity: Erythema and warmth (Moderate pain and pedal edema secondary to fracture right foot) present.   Skin:     Capillary Refill: Capillary refill takes 2 to 3 seconds.      Findings: Erythema present.   Neurological:      Mental Status: She is alert.   Psychiatric:         Thought Content: Thought content normal.                       EXAMINATION:  XR FOOT COMPLETE 3 VIEW LEFT     CLINICAL HISTORY:  .  Unspecified injury of left foot, initial encounter     TECHNIQUE:  AP, lateral and oblique views of the left foot were performed.     COMPARISON:  None     FINDINGS:  There is a minimally displaced peroneus brevis avulsion fracture of the base of the left 5th metatarsal.  Remainder of the bones are intact and located.      Impression:     Peroneus brevis avulsion fracture.        Electronically signed by:Mauricio Yao MD  Date:                                            07/28/2025        Assessment:       1. Closed nondisplaced fracture of fifth metatarsal bone of left foot, initial encounter    2. Injury of left foot, initial encounter    3. Hallux abductovalgus, left    4. Pedal edema    5. Presence of retained hardware - Right Foot        Plan:         18 MG BETAMETHASONE IM LEFT HIP  HYDROCODONE 7.5 MG Q 8 H P.R.N. LEFT FOOT PAIN  AIRCAST BOOT  Patient is obtaining a knee Rollator          Reviewed with patient x-ray, complete fracture through the base of the 5th metatarsal which I would require she consider surgical intervention for best results  We discussed location and potential problems in due to vascular status of this location and tendon which attaches to the bone  On the lateral view showed patient fracture does effect a weight-bearing area on the outside bottom of the foot  Advised surgery to fixate fracture allow the fracture to be well aligned, secured and heel properly quicker  Discussed IM steroid injection to help with the significant amount of swelling, it is recommended majority of the swelling be controlled prior to surgery  Discussed effectiveness of IM steroid injection in reducing pain swelling inflammation, potential side effects and length of time injection may be beneficial  In combination utilize ice/cool and discussed with patient how to gradually adjust to submerging the entire foot and cool water since she has a significant amount of swelling across the entire foot.  As long as well tolerated 20-30 minute intervals 3 times daily  We did discussed bunion deformity of the left foot and advised patient at 1 of her follow-up visits when weight-bearing x-rays can be done can assess deformity better and give her better idea of what would need to be done surgically to correct bunion left foot  It is  recommended at that time we obtain x-rays of the right foot to assess where the screw might have navigated to since it does seem to be tenting the skin on the top of the foot  Prescribed hydrocodone 7.5 mg Q 8 H p.r.n. foot pain  Patient was fitted for an Aircast boot and advised it most likely will not be comfortable until the swelling goes down over the next 24/48 hours  Then she is to utilize the boot for compression and support, no weight is to be placed on this foot  Patient confirms she is obtaining a knee Rollator from a friend  We discussed pre and postoperative regimen regarding surgery  Advised patient she will be in a cast and she will need the knee Rollator for 4-6 weeks, then the walking boot, full details will be given to her on her preop visit  Advised patient she needs to make arrangements to have someone help her at home, transfer in and out of the house and safely navigate utilizing the knee Rollator  Patient was in understanding and agreement with treatment plan, did want to pursue surgery to correct fracture 5th metatarsal  I counseled the patient on their conditions, implications and medical management.  Instructed patient/family to contact the office with any changes, questions, concerns, worsening of symptoms.   Total face to face time 45 minutes, exam, assessment, treatment, discussion, additional time for review of chart prior to and following appointment and visit documentation, consultation and coordination of care.    Follow up surgical consult with Dr. James Dutton in less than week, aug 4th, surgery scheduled for aug stays    This note was created using M*Modal voice recognition software that occasionally misinterpreted phrases or words.        Juliet Dutton DPM  Podiatry  [facility] [address]   [facility], [place]  [phone]      This note was generated with the assistance of ambient listening technology. Verbal consent was obtained by the patient and  accompanying visitor(s) for the recording of patient appointment to facilitate this note. I attest to having reviewed and edited the generated note for accuracy, though some syntax or spelling errors may persist. Please contact the author of this note for any clarification.            [1]   Current Outpatient Medications   Medication Sig Dispense Refill    amLODIPine (NORVASC) 2.5 MG tablet       aspirin (ECOTRIN) 81 MG EC tablet Take 1 tablet (81 mg total) by mouth once daily. 90 tablet 3    diphenoxylate-atropine 2.5-0.025 mg (LOMOTIL) 2.5-0.025 mg per tablet Take by mouth.      HYDROcodone-acetaminophen (NORCO)  mg per tablet Take 1 tablet by mouth every 6 (six) hours as needed for Pain. 12 tablet 0    levothyroxine (SYNTHROID) 50 MCG tablet Take 1 tablet (50 mcg total) by mouth before breakfast. 90 tablet 1    metoprolol succinate (TOPROL-XL) 50 MG 24 hr tablet Take 1 tablet (50 mg total) by mouth once daily. 90 tablet 1    rosuvastatin (CRESTOR) 20 MG tablet Take 1 tablet (20 mg total) by mouth every evening. 90 tablet 3    tiZANidine (ZANAFLEX) 4 MG tablet Take 1 tablet (4 mg total) by mouth every 8 (eight) hours as needed (muscle spasm). 30 tablet 0    traZODone (DESYREL) 50 MG tablet Take 1 tablet (50 mg total) by mouth every evening. 90 tablet 1    vitamin D (VITAMIN D3) 1000 units Tab Take 1,000 Units by mouth once daily.      HYDROcodone-acetaminophen (NORCO) 7.5-325 mg per tablet Take 1 tablet by mouth every 8 (eight) hours as needed for Pain. 21 tablet 0     No current facility-administered medications for this visit.

## 2025-07-31 NOTE — TELEPHONE ENCOUNTER
----- Message from Piper sent at 7/31/2025  8:52 AM CDT -----  Regarding: Appt needed  Mrs. Jackson is wanting to schedule an appt ASAP for a second opinion on her fracture.  Her phone number is [phone].

## 2025-08-04 ENCOUNTER — TELEPHONE (OUTPATIENT)
Dept: ORTHOPEDICS | Facility: CLINIC | Age: 70
End: 2025-08-04
Payer: MEDICARE

## 2025-08-04 DIAGNOSIS — S92.355A CLOSED NONDISPLACED FRACTURE OF FIFTH METATARSAL BONE OF LEFT FOOT, INITIAL ENCOUNTER: Primary | ICD-10-CM

## 2025-08-04 NOTE — TELEPHONE ENCOUNTER
"Spoke with pt , advised per chart note : "follow up radiographs in 3-4 weeks to assess fracture "  Pt verbalized understanding   "

## 2025-08-04 NOTE — TELEPHONE ENCOUNTER
Copied from CRM #[id number]. Topic: Appointments - Appointment Confirmation  >> Aug 4, 2025 10:32 AM Yelitza wrote:  Type:  Needs Medical Advice    Who Called: pt  Symptoms (please be specific):    How long has patient had these symptoms:    Pharmacy name and phone #:    Would the patient rather a call back or a response via [facility]? call  Best Call Back Number: [phone]    Additional Information: pt states she needs to speak with a nurse about her appt

## 2025-08-18 ENCOUNTER — OFFICE VISIT (OUTPATIENT)
Dept: ORTHOPEDICS | Facility: CLINIC | Age: 70
End: 2025-08-18
Payer: MEDICARE

## 2025-08-18 ENCOUNTER — HOSPITAL ENCOUNTER (OUTPATIENT)
Dept: RADIOLOGY | Facility: HOSPITAL | Age: 70
Discharge: HOME | End: 2025-08-18
Attending: ORTHOPAEDIC SURGERY
Payer: MEDICARE

## 2025-08-18 VITALS — BODY MASS INDEX: 21 KG/M2 | WEIGHT: 123 LBS | HEIGHT: 64 IN

## 2025-08-18 DIAGNOSIS — S92.355A CLOSED NONDISPLACED FRACTURE OF FIFTH METATARSAL BONE OF LEFT FOOT, INITIAL ENCOUNTER: Primary | ICD-10-CM

## 2025-08-18 DIAGNOSIS — S92.355A CLOSED NONDISPLACED FRACTURE OF FIFTH METATARSAL BONE OF LEFT FOOT, INITIAL ENCOUNTER: ICD-10-CM

## 2025-08-18 DIAGNOSIS — S92.355D CLOSED NONDISPLACED FRACTURE OF FIFTH METATARSAL BONE OF LEFT FOOT WITH ROUTINE HEALING, SUBSEQUENT ENCOUNTER: Primary | ICD-10-CM

## 2025-08-18 PROCEDURE — 73630 X-RAY EXAM OF FOOT: CPT | Mod: 26,LT,, | Performed by: RADIOLOGY

## 2025-08-18 PROCEDURE — 73630 X-RAY EXAM OF FOOT: CPT | Mod: TC,PN,LT

## 2025-08-18 PROCEDURE — 99214 OFFICE O/P EST MOD 30 MIN: CPT | Mod: S$PBB,57,, | Performed by: ORTHOPAEDIC SURGERY

## 2025-08-18 PROCEDURE — 28470 CLTX METATARSAL FX WO MNP EA: CPT | Mod: S$PBB,LT,, | Performed by: ORTHOPAEDIC SURGERY

## 2025-08-18 PROCEDURE — 99213 OFFICE O/P EST LOW 20 MIN: CPT | Mod: PBBFAC,25,PN | Performed by: ORTHOPAEDIC SURGERY

## 2025-08-18 PROCEDURE — 99999 PR PBB SHADOW E&M-EST. PATIENT-LVL III: CPT | Mod: PBBFAC,,, | Performed by: ORTHOPAEDIC SURGERY

## 2025-08-18 PROCEDURE — 28470 CLTX METATARSAL FX WO MNP EA: CPT | Mod: PBBFAC,PN | Performed by: ORTHOPAEDIC SURGERY

## 2025-08-19 ENCOUNTER — TELEPHONE (OUTPATIENT)
Dept: ORTHOPEDICS | Facility: CLINIC | Age: 70
End: 2025-08-19
Payer: MEDICARE

## 2025-08-19 DIAGNOSIS — S92.355A CLOSED NONDISPLACED FRACTURE OF FIFTH METATARSAL BONE OF LEFT FOOT, INITIAL ENCOUNTER: Primary | ICD-10-CM

## 2025-08-25 ENCOUNTER — TELEPHONE (OUTPATIENT)
Dept: ORTHOPEDICS | Facility: CLINIC | Age: 70
End: 2025-08-25
Payer: MEDICARE

## (undated) DEVICE — NDL HYPODERMIC SAF 25G 1.5IN

## (undated) DEVICE — SYR DISP LL 5CC

## (undated) DEVICE — NDL SAFETY 25G X 1.5 ECLIPSE

## (undated) DEVICE — GLOVE SURG ULTRA TOUCH 7.5

## (undated) DEVICE — SYS LABEL CORRECT MED

## (undated) DEVICE — GLOVE PI ULTRA TOUCH G SURGEON

## (undated) DEVICE — PAD ELECTROSURGICAL PAT PLATE

## (undated) DEVICE — DRAPE C ARM 42 X 120 10/BX

## (undated) DEVICE — NDL SPINAL 25GX3.5 SPINOCAN

## (undated) DEVICE — SPONGE BULKEE II ABSRB 6X6.75

## (undated) DEVICE — COVER PROXIMA MAYO STAND

## (undated) DEVICE — SYR LUER LOCK STERILE 10ML

## (undated) DEVICE — SYR GLASS 5CC LUER LOK

## (undated) DEVICE — GLOVE SURGEONS ULTRA TOUCH 6.5

## (undated) DEVICE — CANNULA RADIOPAQUE 20G CURVED

## (undated) DEVICE — TUBING MINIBORE EXTENSION

## (undated) DEVICE — TOWEL OR DISP STRL BLUE 4/PK

## (undated) DEVICE — NDL SPINAL 25GX3.5 PENCAN

## (undated) DEVICE — SKIN MARKER STER DUAL TIP

## (undated) DEVICE — KIT ENDO CARRY-ON PROC 100310

## (undated) DEVICE — NDL BLUNT W/O FILTER 18GX1.5IN

## (undated) DEVICE — CONTAINER SPECIMEN OR STER 4OZ

## (undated) DEVICE — NDL HYPODERMIC BLUNT 18G 1.5IN

## (undated) DEVICE — CHLORAPREP 10.5 ML APPLICATOR

## (undated) DEVICE — KIT COOLED RF 75MM SGL PROBE

## (undated) DEVICE — GLOVE SURG ULTRA TOUCH 6

## (undated) DEVICE — STRAP OR TABLE 5IN X 72IN

## (undated) DEVICE — NDL SPINAL SPINOCAN 22GX3.5

## (undated) DEVICE — NDL SPINAL 22GX5

## (undated) DEVICE — SOL WATER STRL IRR 1000ML

## (undated) DEVICE — DRAPE THREE-QTR REINF 53X77IN

## (undated) DEVICE — APPLICATOR CHLORAPREP ORN 26ML

## (undated) DEVICE — CANISTER SUCTION 3000CC

## (undated) DEVICE — NDL TUOHY EPIDURAL 20G X 3.5

## (undated) DEVICE — PAD GROUNDING DISPER ELECTRODE